# Patient Record
Sex: MALE | Race: WHITE | NOT HISPANIC OR LATINO | Employment: FULL TIME | URBAN - NONMETROPOLITAN AREA
[De-identification: names, ages, dates, MRNs, and addresses within clinical notes are randomized per-mention and may not be internally consistent; named-entity substitution may affect disease eponyms.]

---

## 2017-01-13 ENCOUNTER — HOSPITAL ENCOUNTER (OUTPATIENT)
Dept: LAB | Facility: MEDICAL CENTER | Age: 52
End: 2017-01-13
Attending: NURSE PRACTITIONER
Payer: COMMERCIAL

## 2017-01-13 LAB — POTASSIUM SERPL-SCNC: 3.3 MMOL/L (ref 3.6–5.5)

## 2017-01-13 PROCEDURE — 84132 ASSAY OF SERUM POTASSIUM: CPT

## 2017-01-13 PROCEDURE — 36415 COLL VENOUS BLD VENIPUNCTURE: CPT

## 2017-03-02 ENCOUNTER — HOSPITAL ENCOUNTER (OUTPATIENT)
Dept: LAB | Facility: MEDICAL CENTER | Age: 52
End: 2017-03-02
Attending: NURSE PRACTITIONER
Payer: COMMERCIAL

## 2017-03-02 LAB — POTASSIUM SERPL-SCNC: 3.9 MMOL/L (ref 3.6–5.5)

## 2017-03-02 PROCEDURE — 36415 COLL VENOUS BLD VENIPUNCTURE: CPT

## 2017-03-02 PROCEDURE — 84132 ASSAY OF SERUM POTASSIUM: CPT

## 2017-11-01 ENCOUNTER — HOSPITAL ENCOUNTER (OUTPATIENT)
Dept: LAB | Facility: MEDICAL CENTER | Age: 52
End: 2017-11-01
Attending: NURSE PRACTITIONER
Payer: COMMERCIAL

## 2017-11-01 LAB
ALBUMIN SERPL BCP-MCNC: 3.9 G/DL (ref 3.2–4.9)
ALBUMIN/GLOB SERPL: 1.2 G/DL
ALP SERPL-CCNC: 60 U/L (ref 30–99)
ALT SERPL-CCNC: 15 U/L (ref 2–50)
ANION GAP SERPL CALC-SCNC: 6 MMOL/L (ref 0–11.9)
AST SERPL-CCNC: 20 U/L (ref 12–45)
BASOPHILS # BLD AUTO: 1 % (ref 0–1.8)
BASOPHILS # BLD: 0.07 K/UL (ref 0–0.12)
BILIRUB SERPL-MCNC: 0.7 MG/DL (ref 0.1–1.5)
BUN SERPL-MCNC: 22 MG/DL (ref 8–22)
CALCIUM SERPL-MCNC: 9.7 MG/DL (ref 8.5–10.5)
CHLORIDE SERPL-SCNC: 103 MMOL/L (ref 96–112)
CHOLEST SERPL-MCNC: 168 MG/DL (ref 100–199)
CO2 SERPL-SCNC: 30 MMOL/L (ref 20–33)
CREAT SERPL-MCNC: 0.89 MG/DL (ref 0.5–1.4)
EOSINOPHIL # BLD AUTO: 0.15 K/UL (ref 0–0.51)
EOSINOPHIL NFR BLD: 2 % (ref 0–6.9)
ERYTHROCYTE [DISTWIDTH] IN BLOOD BY AUTOMATED COUNT: 44.2 FL (ref 35.9–50)
GFR SERPL CREATININE-BSD FRML MDRD: >60 ML/MIN/1.73 M 2
GLOBULIN SER CALC-MCNC: 3.2 G/DL (ref 1.9–3.5)
GLUCOSE SERPL-MCNC: 94 MG/DL (ref 65–99)
HCT VFR BLD AUTO: 46.7 % (ref 42–52)
HDLC SERPL-MCNC: 37 MG/DL
HGB BLD-MCNC: 15.5 G/DL (ref 14–18)
IMM GRANULOCYTES # BLD AUTO: 0.03 K/UL (ref 0–0.11)
IMM GRANULOCYTES NFR BLD AUTO: 0.4 % (ref 0–0.9)
LDLC SERPL CALC-MCNC: 101 MG/DL
LYMPHOCYTES # BLD AUTO: 1.8 K/UL (ref 1–4.8)
LYMPHOCYTES NFR BLD: 24.5 % (ref 22–41)
MCH RBC QN AUTO: 31.7 PG (ref 27–33)
MCHC RBC AUTO-ENTMCNC: 33.2 G/DL (ref 33.7–35.3)
MCV RBC AUTO: 95.5 FL (ref 81.4–97.8)
MONOCYTES # BLD AUTO: 0.58 K/UL (ref 0–0.85)
MONOCYTES NFR BLD AUTO: 7.9 % (ref 0–13.4)
NEUTROPHILS # BLD AUTO: 4.73 K/UL (ref 1.82–7.42)
NEUTROPHILS NFR BLD: 64.2 % (ref 44–72)
NRBC # BLD AUTO: 0 K/UL
NRBC BLD AUTO-RTO: 0 /100 WBC
PLATELET # BLD AUTO: 166 K/UL (ref 164–446)
PMV BLD AUTO: 11.3 FL (ref 9–12.9)
POTASSIUM SERPL-SCNC: 3.9 MMOL/L (ref 3.6–5.5)
PROT SERPL-MCNC: 7.1 G/DL (ref 6–8.2)
RBC # BLD AUTO: 4.89 M/UL (ref 4.7–6.1)
SODIUM SERPL-SCNC: 139 MMOL/L (ref 135–145)
TRIGL SERPL-MCNC: 152 MG/DL (ref 0–149)
WBC # BLD AUTO: 7.4 K/UL (ref 4.8–10.8)

## 2017-11-01 PROCEDURE — 80053 COMPREHEN METABOLIC PANEL: CPT

## 2017-11-01 PROCEDURE — 80061 LIPID PANEL: CPT

## 2017-11-01 PROCEDURE — 36415 COLL VENOUS BLD VENIPUNCTURE: CPT

## 2017-11-01 PROCEDURE — 85025 COMPLETE CBC W/AUTO DIFF WBC: CPT

## 2018-06-02 ENCOUNTER — HOSPITAL ENCOUNTER (OUTPATIENT)
Dept: LAB | Facility: MEDICAL CENTER | Age: 53
End: 2018-06-02
Attending: NURSE PRACTITIONER
Payer: COMMERCIAL

## 2018-06-02 LAB
ALBUMIN SERPL BCP-MCNC: 4 G/DL (ref 3.2–4.9)
ALBUMIN/GLOB SERPL: 1.3 G/DL
ALP SERPL-CCNC: 56 U/L (ref 30–99)
ALT SERPL-CCNC: 18 U/L (ref 2–50)
ANION GAP SERPL CALC-SCNC: 7 MMOL/L (ref 0–11.9)
AST SERPL-CCNC: 21 U/L (ref 12–45)
BASOPHILS # BLD AUTO: 0.7 % (ref 0–1.8)
BASOPHILS # BLD: 0.04 K/UL (ref 0–0.12)
BILIRUB SERPL-MCNC: 0.6 MG/DL (ref 0.1–1.5)
BUN SERPL-MCNC: 18 MG/DL (ref 8–22)
CALCIUM SERPL-MCNC: 9.1 MG/DL (ref 8.5–10.5)
CHLORIDE SERPL-SCNC: 104 MMOL/L (ref 96–112)
CHOLEST SERPL-MCNC: 165 MG/DL (ref 100–199)
CO2 SERPL-SCNC: 30 MMOL/L (ref 20–33)
CREAT SERPL-MCNC: 0.97 MG/DL (ref 0.5–1.4)
EOSINOPHIL # BLD AUTO: 0.12 K/UL (ref 0–0.51)
EOSINOPHIL NFR BLD: 2.1 % (ref 0–6.9)
ERYTHROCYTE [DISTWIDTH] IN BLOOD BY AUTOMATED COUNT: 42.7 FL (ref 35.9–50)
GLOBULIN SER CALC-MCNC: 3 G/DL (ref 1.9–3.5)
GLUCOSE SERPL-MCNC: 109 MG/DL (ref 65–99)
HCT VFR BLD AUTO: 43 % (ref 42–52)
HDLC SERPL-MCNC: 37 MG/DL
HGB BLD-MCNC: 15 G/DL (ref 14–18)
IMM GRANULOCYTES # BLD AUTO: 0.03 K/UL (ref 0–0.11)
IMM GRANULOCYTES NFR BLD AUTO: 0.5 % (ref 0–0.9)
LDLC SERPL CALC-MCNC: 86 MG/DL
LYMPHOCYTES # BLD AUTO: 1.29 K/UL (ref 1–4.8)
LYMPHOCYTES NFR BLD: 22.4 % (ref 22–41)
MCH RBC QN AUTO: 32.8 PG (ref 27–33)
MCHC RBC AUTO-ENTMCNC: 34.9 G/DL (ref 33.7–35.3)
MCV RBC AUTO: 94.1 FL (ref 81.4–97.8)
MONOCYTES # BLD AUTO: 0.47 K/UL (ref 0–0.85)
MONOCYTES NFR BLD AUTO: 8.2 % (ref 0–13.4)
NEUTROPHILS # BLD AUTO: 3.81 K/UL (ref 1.82–7.42)
NEUTROPHILS NFR BLD: 66.1 % (ref 44–72)
NRBC # BLD AUTO: 0 K/UL
NRBC BLD-RTO: 0 /100 WBC
PLATELET # BLD AUTO: 175 K/UL (ref 164–446)
PMV BLD AUTO: 10.7 FL (ref 9–12.9)
POTASSIUM SERPL-SCNC: 3.9 MMOL/L (ref 3.6–5.5)
PROT SERPL-MCNC: 7 G/DL (ref 6–8.2)
RBC # BLD AUTO: 4.57 M/UL (ref 4.7–6.1)
SODIUM SERPL-SCNC: 141 MMOL/L (ref 135–145)
TRIGL SERPL-MCNC: 210 MG/DL (ref 0–149)
WBC # BLD AUTO: 5.8 K/UL (ref 4.8–10.8)

## 2018-06-02 PROCEDURE — 80053 COMPREHEN METABOLIC PANEL: CPT

## 2018-06-02 PROCEDURE — 85025 COMPLETE CBC W/AUTO DIFF WBC: CPT

## 2018-06-02 PROCEDURE — 80061 LIPID PANEL: CPT

## 2018-06-02 PROCEDURE — 36415 COLL VENOUS BLD VENIPUNCTURE: CPT

## 2019-04-20 ENCOUNTER — HOSPITAL ENCOUNTER (OUTPATIENT)
Dept: LAB | Facility: MEDICAL CENTER | Age: 54
End: 2019-04-20
Attending: FAMILY MEDICINE
Payer: COMMERCIAL

## 2019-04-20 LAB
ALBUMIN SERPL BCP-MCNC: 3.7 G/DL (ref 3.2–4.9)
ALBUMIN/GLOB SERPL: 1.5 G/DL
ALP SERPL-CCNC: 48 U/L (ref 30–99)
ALT SERPL-CCNC: 19 U/L (ref 2–50)
ANION GAP SERPL CALC-SCNC: 7 MMOL/L (ref 0–11.9)
AST SERPL-CCNC: 18 U/L (ref 12–45)
BASOPHILS # BLD AUTO: 0.9 % (ref 0–1.8)
BASOPHILS # BLD: 0.05 K/UL (ref 0–0.12)
BILIRUB SERPL-MCNC: 0.7 MG/DL (ref 0.1–1.5)
BUN SERPL-MCNC: 19 MG/DL (ref 8–22)
CALCIUM SERPL-MCNC: 8.7 MG/DL (ref 8.5–10.5)
CHLORIDE SERPL-SCNC: 106 MMOL/L (ref 96–112)
CHOLEST SERPL-MCNC: 180 MG/DL (ref 100–199)
CO2 SERPL-SCNC: 28 MMOL/L (ref 20–33)
CREAT SERPL-MCNC: 0.71 MG/DL (ref 0.5–1.4)
EOSINOPHIL # BLD AUTO: 0.18 K/UL (ref 0–0.51)
EOSINOPHIL NFR BLD: 3.2 % (ref 0–6.9)
ERYTHROCYTE [DISTWIDTH] IN BLOOD BY AUTOMATED COUNT: 44.3 FL (ref 35.9–50)
EST. AVERAGE GLUCOSE BLD GHB EST-MCNC: 111 MG/DL
FASTING STATUS PATIENT QL REPORTED: NORMAL
GLOBULIN SER CALC-MCNC: 2.4 G/DL (ref 1.9–3.5)
GLUCOSE SERPL-MCNC: 109 MG/DL (ref 65–99)
HBA1C MFR BLD: 5.5 % (ref 0–5.6)
HCT VFR BLD AUTO: 43.6 % (ref 42–52)
HDLC SERPL-MCNC: 39 MG/DL
HGB BLD-MCNC: 15.1 G/DL (ref 14–18)
IMM GRANULOCYTES # BLD AUTO: 0.02 K/UL (ref 0–0.11)
IMM GRANULOCYTES NFR BLD AUTO: 0.4 % (ref 0–0.9)
LDLC SERPL CALC-MCNC: 127 MG/DL
LYMPHOCYTES # BLD AUTO: 1.23 K/UL (ref 1–4.8)
LYMPHOCYTES NFR BLD: 22.1 % (ref 22–41)
MCH RBC QN AUTO: 33 PG (ref 27–33)
MCHC RBC AUTO-ENTMCNC: 34.6 G/DL (ref 33.7–35.3)
MCV RBC AUTO: 95.4 FL (ref 81.4–97.8)
MONOCYTES # BLD AUTO: 0.51 K/UL (ref 0–0.85)
MONOCYTES NFR BLD AUTO: 9.2 % (ref 0–13.4)
NEUTROPHILS # BLD AUTO: 3.58 K/UL (ref 1.82–7.42)
NEUTROPHILS NFR BLD: 64.2 % (ref 44–72)
NRBC # BLD AUTO: 0 K/UL
NRBC BLD-RTO: 0 /100 WBC
PLATELET # BLD AUTO: 136 K/UL (ref 164–446)
PMV BLD AUTO: 11.3 FL (ref 9–12.9)
POTASSIUM SERPL-SCNC: 3.7 MMOL/L (ref 3.6–5.5)
PROT SERPL-MCNC: 6.1 G/DL (ref 6–8.2)
RBC # BLD AUTO: 4.57 M/UL (ref 4.7–6.1)
SODIUM SERPL-SCNC: 141 MMOL/L (ref 135–145)
TRIGL SERPL-MCNC: 68 MG/DL (ref 0–149)
WBC # BLD AUTO: 5.6 K/UL (ref 4.8–10.8)

## 2019-04-20 PROCEDURE — 85025 COMPLETE CBC W/AUTO DIFF WBC: CPT

## 2019-04-20 PROCEDURE — 80053 COMPREHEN METABOLIC PANEL: CPT

## 2019-04-20 PROCEDURE — 80061 LIPID PANEL: CPT

## 2019-04-20 PROCEDURE — 83036 HEMOGLOBIN GLYCOSYLATED A1C: CPT

## 2019-04-20 PROCEDURE — 36415 COLL VENOUS BLD VENIPUNCTURE: CPT

## 2020-08-22 ENCOUNTER — HOSPITAL ENCOUNTER (OUTPATIENT)
Dept: LAB | Facility: MEDICAL CENTER | Age: 55
End: 2020-08-22
Attending: FAMILY MEDICINE
Payer: COMMERCIAL

## 2020-08-22 LAB
ALBUMIN SERPL BCP-MCNC: 4 G/DL (ref 3.2–4.9)
ALBUMIN/GLOB SERPL: 1.6 G/DL
ALP SERPL-CCNC: 53 U/L (ref 30–99)
ALT SERPL-CCNC: 17 U/L (ref 2–50)
ANION GAP SERPL CALC-SCNC: 10 MMOL/L (ref 7–16)
AST SERPL-CCNC: 14 U/L (ref 12–45)
BASOPHILS # BLD AUTO: 0.7 % (ref 0–1.8)
BASOPHILS # BLD: 0.04 K/UL (ref 0–0.12)
BILIRUB SERPL-MCNC: 0.6 MG/DL (ref 0.1–1.5)
BUN SERPL-MCNC: 20 MG/DL (ref 8–22)
CALCIUM SERPL-MCNC: 9.2 MG/DL (ref 8.5–10.5)
CHLORIDE SERPL-SCNC: 103 MMOL/L (ref 96–112)
CHOLEST SERPL-MCNC: 189 MG/DL (ref 100–199)
CO2 SERPL-SCNC: 28 MMOL/L (ref 20–33)
CREAT SERPL-MCNC: 0.71 MG/DL (ref 0.5–1.4)
EOSINOPHIL # BLD AUTO: 0.15 K/UL (ref 0–0.51)
EOSINOPHIL NFR BLD: 2.5 % (ref 0–6.9)
ERYTHROCYTE [DISTWIDTH] IN BLOOD BY AUTOMATED COUNT: 43.8 FL (ref 35.9–50)
EST. AVERAGE GLUCOSE BLD GHB EST-MCNC: 108 MG/DL
FASTING STATUS PATIENT QL REPORTED: NORMAL
GLOBULIN SER CALC-MCNC: 2.5 G/DL (ref 1.9–3.5)
GLUCOSE SERPL-MCNC: 110 MG/DL (ref 65–99)
HBA1C MFR BLD: 5.4 % (ref 0–5.6)
HCT VFR BLD AUTO: 45.3 % (ref 42–52)
HDLC SERPL-MCNC: 40 MG/DL
HGB BLD-MCNC: 15.4 G/DL (ref 14–18)
IMM GRANULOCYTES # BLD AUTO: 0.01 K/UL (ref 0–0.11)
IMM GRANULOCYTES NFR BLD AUTO: 0.2 % (ref 0–0.9)
LDLC SERPL CALC-MCNC: 134 MG/DL
LYMPHOCYTES # BLD AUTO: 1.66 K/UL (ref 1–4.8)
LYMPHOCYTES NFR BLD: 28 % (ref 22–41)
MCH RBC QN AUTO: 32.9 PG (ref 27–33)
MCHC RBC AUTO-ENTMCNC: 34 G/DL (ref 33.7–35.3)
MCV RBC AUTO: 96.8 FL (ref 81.4–97.8)
MONOCYTES # BLD AUTO: 0.5 K/UL (ref 0–0.85)
MONOCYTES NFR BLD AUTO: 8.4 % (ref 0–13.4)
NEUTROPHILS # BLD AUTO: 3.57 K/UL (ref 1.82–7.42)
NEUTROPHILS NFR BLD: 60.2 % (ref 44–72)
NRBC # BLD AUTO: 0 K/UL
NRBC BLD-RTO: 0 /100 WBC
PLATELET # BLD AUTO: 155 K/UL (ref 164–446)
PMV BLD AUTO: 10.3 FL (ref 9–12.9)
POTASSIUM SERPL-SCNC: 3.8 MMOL/L (ref 3.6–5.5)
PROT SERPL-MCNC: 6.5 G/DL (ref 6–8.2)
RBC # BLD AUTO: 4.68 M/UL (ref 4.7–6.1)
SODIUM SERPL-SCNC: 141 MMOL/L (ref 135–145)
TRIGL SERPL-MCNC: 77 MG/DL (ref 0–149)
WBC # BLD AUTO: 5.9 K/UL (ref 4.8–10.8)

## 2020-08-22 PROCEDURE — 85025 COMPLETE CBC W/AUTO DIFF WBC: CPT

## 2020-08-22 PROCEDURE — 36415 COLL VENOUS BLD VENIPUNCTURE: CPT

## 2020-08-22 PROCEDURE — 83036 HEMOGLOBIN GLYCOSYLATED A1C: CPT

## 2020-08-22 PROCEDURE — 80053 COMPREHEN METABOLIC PANEL: CPT

## 2020-08-22 PROCEDURE — 80061 LIPID PANEL: CPT

## 2021-09-18 ENCOUNTER — HOSPITAL ENCOUNTER (OUTPATIENT)
Dept: LAB | Facility: MEDICAL CENTER | Age: 56
End: 2021-09-18
Attending: FAMILY MEDICINE
Payer: COMMERCIAL

## 2021-09-18 LAB
ALBUMIN SERPL BCP-MCNC: 4.1 G/DL (ref 3.2–4.9)
ALBUMIN/GLOB SERPL: 1.4 G/DL
ALP SERPL-CCNC: 59 U/L (ref 30–99)
ALT SERPL-CCNC: 17 U/L (ref 2–50)
ANION GAP SERPL CALC-SCNC: 10 MMOL/L (ref 7–16)
AST SERPL-CCNC: 17 U/L (ref 12–45)
BASOPHILS # BLD AUTO: 0.7 % (ref 0–1.8)
BASOPHILS # BLD: 0.05 K/UL (ref 0–0.12)
BILIRUB SERPL-MCNC: 0.6 MG/DL (ref 0.1–1.5)
BUN SERPL-MCNC: 24 MG/DL (ref 8–22)
CALCIUM SERPL-MCNC: 9.3 MG/DL (ref 8.5–10.5)
CHLORIDE SERPL-SCNC: 103 MMOL/L (ref 96–112)
CHOLEST SERPL-MCNC: 190 MG/DL (ref 100–199)
CO2 SERPL-SCNC: 29 MMOL/L (ref 20–33)
CREAT SERPL-MCNC: 0.81 MG/DL (ref 0.5–1.4)
EOSINOPHIL # BLD AUTO: 0.13 K/UL (ref 0–0.51)
EOSINOPHIL NFR BLD: 1.7 % (ref 0–6.9)
ERYTHROCYTE [DISTWIDTH] IN BLOOD BY AUTOMATED COUNT: 45.5 FL (ref 35.9–50)
FASTING STATUS PATIENT QL REPORTED: NORMAL
GLOBULIN SER CALC-MCNC: 2.9 G/DL (ref 1.9–3.5)
GLUCOSE SERPL-MCNC: 112 MG/DL (ref 65–99)
HCT VFR BLD AUTO: 45.6 % (ref 42–52)
HDLC SERPL-MCNC: 38 MG/DL
HGB BLD-MCNC: 15.6 G/DL (ref 14–18)
IMM GRANULOCYTES # BLD AUTO: 0.03 K/UL (ref 0–0.11)
IMM GRANULOCYTES NFR BLD AUTO: 0.4 % (ref 0–0.9)
LDLC SERPL CALC-MCNC: 130 MG/DL
LYMPHOCYTES # BLD AUTO: 1.69 K/UL (ref 1–4.8)
LYMPHOCYTES NFR BLD: 22.7 % (ref 22–41)
MCH RBC QN AUTO: 32.4 PG (ref 27–33)
MCHC RBC AUTO-ENTMCNC: 34.2 G/DL (ref 33.7–35.3)
MCV RBC AUTO: 94.8 FL (ref 81.4–97.8)
MONOCYTES # BLD AUTO: 0.55 K/UL (ref 0–0.85)
MONOCYTES NFR BLD AUTO: 7.4 % (ref 0–13.4)
NEUTROPHILS # BLD AUTO: 4.98 K/UL (ref 1.82–7.42)
NEUTROPHILS NFR BLD: 67.1 % (ref 44–72)
NRBC # BLD AUTO: 0 K/UL
NRBC BLD-RTO: 0 /100 WBC
PLATELET # BLD AUTO: 157 K/UL (ref 164–446)
PMV BLD AUTO: 10.7 FL (ref 9–12.9)
POTASSIUM SERPL-SCNC: 3.9 MMOL/L (ref 3.6–5.5)
PROT SERPL-MCNC: 7 G/DL (ref 6–8.2)
RBC # BLD AUTO: 4.81 M/UL (ref 4.7–6.1)
SODIUM SERPL-SCNC: 142 MMOL/L (ref 135–145)
TRIGL SERPL-MCNC: 109 MG/DL (ref 0–149)
WBC # BLD AUTO: 7.4 K/UL (ref 4.8–10.8)

## 2021-09-18 PROCEDURE — 80053 COMPREHEN METABOLIC PANEL: CPT

## 2021-09-18 PROCEDURE — 80061 LIPID PANEL: CPT

## 2021-09-18 PROCEDURE — 85025 COMPLETE CBC W/AUTO DIFF WBC: CPT

## 2021-09-18 PROCEDURE — 36415 COLL VENOUS BLD VENIPUNCTURE: CPT

## 2022-06-11 ENCOUNTER — HOSPITAL ENCOUNTER (OUTPATIENT)
Dept: LAB | Facility: MEDICAL CENTER | Age: 57
End: 2022-06-11
Attending: ALLERGY & IMMUNOLOGY
Payer: COMMERCIAL

## 2022-06-11 LAB
ANION GAP SERPL CALC-SCNC: 9 MMOL/L (ref 7–16)
BUN SERPL-MCNC: 17 MG/DL (ref 8–22)
CALCIUM SERPL-MCNC: 9 MG/DL (ref 8.5–10.5)
CHLORIDE SERPL-SCNC: 101 MMOL/L (ref 96–112)
CO2 SERPL-SCNC: 28 MMOL/L (ref 20–33)
CREAT SERPL-MCNC: 0.75 MG/DL (ref 0.5–1.4)
GFR SERPLBLD CREATININE-BSD FMLA CKD-EPI: 107 ML/MIN/1.73 M 2
GLUCOSE SERPL-MCNC: 118 MG/DL (ref 65–99)
POTASSIUM SERPL-SCNC: 3.8 MMOL/L (ref 3.6–5.5)
SODIUM SERPL-SCNC: 138 MMOL/L (ref 135–145)

## 2022-06-11 PROCEDURE — 36415 COLL VENOUS BLD VENIPUNCTURE: CPT

## 2022-06-11 PROCEDURE — 80048 BASIC METABOLIC PNL TOTAL CA: CPT

## 2022-06-26 ENCOUNTER — HOSPITAL ENCOUNTER (OUTPATIENT)
Dept: RADIOLOGY | Facility: MEDICAL CENTER | Age: 57
End: 2022-06-26
Attending: ALLERGY & IMMUNOLOGY
Payer: COMMERCIAL

## 2022-06-26 DIAGNOSIS — R05.3 CHRONIC COUGH: ICD-10-CM

## 2022-06-26 DIAGNOSIS — Z85.47 PERSONAL HISTORY OF MALIGNANT NEOPLASM OF TESTIS: ICD-10-CM

## 2022-07-09 ENCOUNTER — HOSPITAL ENCOUNTER (OUTPATIENT)
Dept: RADIOLOGY | Facility: MEDICAL CENTER | Age: 57
End: 2022-07-09
Attending: ALLERGY & IMMUNOLOGY
Payer: COMMERCIAL

## 2022-07-09 DIAGNOSIS — R05.3 CHRONIC COUGH: ICD-10-CM

## 2022-07-09 DIAGNOSIS — Z85.47 PERSONAL HISTORY OF MALIGNANT NEOPLASM OF TESTIS: ICD-10-CM

## 2022-07-09 PROCEDURE — 71260 CT THORAX DX C+: CPT

## 2022-07-09 PROCEDURE — 700117 HCHG RX CONTRAST REV CODE 255: Performed by: ALLERGY & IMMUNOLOGY

## 2022-07-09 RX ADMIN — IOHEXOL 80 ML: 350 INJECTION, SOLUTION INTRAVENOUS at 10:11

## 2022-07-24 ASSESSMENT — ENCOUNTER SYMPTOMS
DYSPNEA AT REST: 0
WHEEZING: 1
HEMOPTYSIS: 0
CHEST TIGHTNESS: 1
RESPIRATORY SYMPTOMS COMMENTS: NO
SHORTNESS OF BREATH: 0

## 2022-08-04 ENCOUNTER — OFFICE VISIT (OUTPATIENT)
Dept: SLEEP MEDICINE | Facility: MEDICAL CENTER | Age: 57
End: 2022-08-04
Payer: COMMERCIAL

## 2022-08-04 VITALS
HEART RATE: 98 BPM | OXYGEN SATURATION: 92 % | SYSTOLIC BLOOD PRESSURE: 116 MMHG | WEIGHT: 271 LBS | HEIGHT: 76 IN | BODY MASS INDEX: 33 KG/M2 | DIASTOLIC BLOOD PRESSURE: 72 MMHG

## 2022-08-04 DIAGNOSIS — K21.9 GASTROESOPHAGEAL REFLUX DISEASE, UNSPECIFIED WHETHER ESOPHAGITIS PRESENT: ICD-10-CM

## 2022-08-04 DIAGNOSIS — R06.02 SOB (SHORTNESS OF BREATH): ICD-10-CM

## 2022-08-04 DIAGNOSIS — Z85.47 HX OF TESTICULAR CANCER: ICD-10-CM

## 2022-08-04 DIAGNOSIS — R91.8 LUNG MASS: ICD-10-CM

## 2022-08-04 DIAGNOSIS — R93.89 ABNORMAL CT OF THE CHEST: ICD-10-CM

## 2022-08-04 DIAGNOSIS — R05.9 COUGH: ICD-10-CM

## 2022-08-04 PROCEDURE — 99202 OFFICE O/P NEW SF 15 MIN: CPT | Performed by: INTERNAL MEDICINE

## 2022-08-04 RX ORDER — FLUTICASONE PROPIONATE AND SALMETEROL XINAFOATE 115; 21 UG/1; UG/1
1 AEROSOL, METERED RESPIRATORY (INHALATION) 2 TIMES DAILY
Qty: 12 G | Refills: 4 | Status: SHIPPED | OUTPATIENT
Start: 2022-08-04 | End: 2022-08-24

## 2022-08-04 RX ORDER — OMEPRAZOLE 20 MG/1
20 CAPSULE, DELAYED RELEASE ORAL DAILY
COMMUNITY
Start: 2022-07-19 | End: 2022-11-22

## 2022-08-04 RX ORDER — HYDROCHLOROTHIAZIDE 50 MG/1
50 TABLET ORAL 2 TIMES DAILY
COMMUNITY
Start: 2022-06-14 | End: 2023-10-30 | Stop reason: SDUPTHER

## 2022-08-04 ASSESSMENT — PATIENT HEALTH QUESTIONNAIRE - PHQ9: CLINICAL INTERPRETATION OF PHQ2 SCORE: 0

## 2022-08-04 ASSESSMENT — FIBROSIS 4 INDEX: FIB4 SCORE: 1.42

## 2022-08-04 NOTE — PROGRESS NOTES
"Pulmonary Clinic Office Note    Reason for visit: \"new patient\"    Chart reviewed prior to patient interview.    Babatunde Sullivan is a 56 y.o. year old male, with a PMHx of SOB  who presented to the Pulmonary Clinic for a new referral.      Background:  -has a hx of testicular cancer in 1984 (16-18 yo), had a mass attached to the left kidney and was removed with LAD, and also the testicle in 1984, then in Dec of 1984 was found to have a RUL lesion, had a toracotomy with wedge resection and was free of malignancy.      Today:  -in March 2020 had hemoptysis with bronchitis and since then has been coughing since then but not as bad  -In Jan 2021 had covid and make his symptoms of SOB, cough significantly worse since then (last 16 months)  -He complains of fatigue, but no fevers , no chills, no weight loss.  -workup with CT chest showed a lung mass in the RML, triggered by workup of his cough of 16  Months and SOB.       No GERD symptoms  No history of respiratory failure requiring mechanical ventilation  No Personal history of non-resolving or recurrent pneumonia  No Personal history of DVT or pulmonary embolism  No Personal history of recurrent sinusitis or epistaxis/purulent discharge  No reported NSAID precipitated cough, wheeze or sinus congestion    No cold air intolerance  No exercise induced cough wheeze  No family hx of atopy and or asthma  No history of nasal polyps  No hx of recent COVID-19 infection    Pulmonary History:  Inhaler Regimen before this clinic visit: none  Tobacco use:  Never smoker, mom and dad smoked inside the house while growing up.  Occupational exposure:  at a longterm  Dust/Mold Exposure: none  Pet(s) exposure: 1 dogs  TB exposure: last test was jan 2022, negative PPD    MMRC Dyspnea Scale  Grade  Description of Breathlessness   0  I only get breathless with strenuous exercise.   1  I get short of breath when hurrying on level ground or walking up a slight hill.   2  On level " ground, I walk slower than people of the same age because of breathlessness, or have to stop for breath when walking at my own pace.   3  I stop for breath after walking about 100 yards or after a few minutes on level ground.   4  I am too breathless to leave the house or I am breathless when dressing.     Influenza Vaccine:  none  COVID vaccine: none  Pneumovax:  none      ----------------------------------------------------------------------------------------------------------------------------------------------------------------------------------------------------------------------------------------------------------------  Past Medical History:   Diagnosis Date   • Back pain    • Back pain    • Bronchitis    • Chickenpox    • Cough    • Influenza    • Obesity    • Ringing in ears    • Tonsillitis    • Wears glasses    • Wheezing      Allergies   Allergen Reactions   • Mefoxin Swelling     Family History   Problem Relation Age of Onset   • Cancer Father            • Hypertension Father    • Stroke Father      Past Surgical History:   Procedure Laterality Date   • ARTHROSCOPY, KNEE     • TONSILLECTOMY       Social History     Tobacco Use   • Smoking status: Never Smoker   • Smokeless tobacco: Never Used   • Tobacco comment: Both of my parents smoked cigarettes   Vaping Use   • Vaping Use: Never used   Substance Use Topics   • Alcohol use: Not Currently     Alcohol/week: 10.8 oz     Types: 12 Cans of beer, 6 Shots of liquor per week     Comment: Have been on the [employer] for 30 years   • Drug use: Not Currently     Types: Marijuana, Oral     Comment: No drugs done in over 30 years         Review of Systems (Positive in Bold, otherwise negative)    Constitutional: fever, chills, night sweats, weightloss  HEENT: headaches, migraines, vision changes, blurred vision, dry eyes,  changes in hearing, rhinorrhea, sinus congestion, dysphagia  Hoarseness of voice, choking  CV: chest pain, CARBAJAL, orthopnea, edema,  "palpitations  Resp: SOB, cough, hemoptysis, asthma, repeated infections, sputum production, wheezing  GI: changes in appetite, nausea, vomiting, diarrhea, constipation, pain, GERD  : Dysuria, hematuria, nocturia  Lymph: swollen glands  MSK: Muscle weakness, wasting, arthralgia, myalgia  Neuro/Psych: Sensory disturbances, seizures, syncope, anxiety, depression    Objective:  Vitals: /72 (BP Location: Left arm, Patient Position: Sitting, BP Cuff Size: Adult)   Pulse 98   Ht 1.93 m (6' 4\") Comment: per pt  Wt 123 kg (271 lb)   SpO2 92%   BMI 32.99 kg/m²     Wt Readings from Last 3 Encounters:   08/04/22 123 kg (271 lb)     Gen: AAO x 3, appears of stated age, well-nourished and in NAD  Eyes: sclerae anicteric, conjunctivae appear normal, PEERLA, EOM in tact  ENT: EAC appears normal w/o erythema/exudate.  Neck: Supple w/o evidence of LAD, thyroid normal and size and w/o nodularity  CV: RRR w/o murmurs, rubs, gallops. Normal S1/S2. No peripheral edema orJVD.  Lungs: CTAB w/o wheezing, rales, rhonchi. Good air entry, normal chest excursion.  GI: Soft and non-tender, + BS x 4. No rebound or guarding.  Extremities: +2/4 bilateral pedal pulses, cool and dry, no edema.  MS: +5/5 bilateral UE/LE muscle strength testing, no obvious joint deformities, swelling noted, good overall muscle tone  Neuro: No focal neurological deficits    ----------------------------------------------------------------------------------------------------------------------------------------------------------------------------------------------------------------------------------------------------------------  Labs, Imaging & Scoring Systems:    Lab results since patient's previous encounter were reviewed with the patient.  Pertinent results discussed below or included within the note.    PFTs (Date: None to date)-    CT Chest: (Date: 7/9/22)-  Lungs: There are diffuse bilateral subpleural interstitial opacities. There are no suspicious " parenchymal lung nodules.  Mediastinum/Amy: There is a right hilar mass/lymphadenopathy with some mass effect on the right anterolateral lower lobe bronchus and encasement of the descending pulmonary artery. Due to postobstructive airspace disease in the right middle   lobe/anterior segment right lower lobe is difficult to obtain an accurate measurement of the suspected masslike area. The AP diameter is estimated at 3.4 cm.  Pleura: No pleural effusion.  Cardiac: Heart normal in size without pericardial effusion.  Vascular: There is mild atherosclerosis with coronary artery calcification.  Soft tissues: There is incidental fat stranding in the right posterior lateral chest wall in the infrascapular location possibly related to incidental elastofibroma dorsi versus simply fatty infiltration. This is of no clinical significance.  Bones: No acute or destructive process.  Upper abdomen:  There is a single 9 mm hay hepatis lymph node of doubtful clinical significance and within normal limits by size criteria. Gallbladder is surgically absent. There is extensive postoperative change in the upper abdomen consistent with   prior retroperitoneal lymph node dissection. There is an incidental fat-containing epigastric ventral abdominal wall hernia.     IMPRESSION:   1.  There is a right hilar mass/lymphadenopathy with mass effect on the anterolateral right lower lobe bronchus and encasement of the descending pulmonary artery suspicious for malignancy. This could represent a primary lung malignancy versus an atypical   appearance of testicular chiquita metastasis.  2.  There is postobstructive pneumonitis/atelectasis in the right middle lobe/anterior lower lobe.  3.  Bilateral subpleural interstitial opacities could represent underlying interstitial disease.       ECHO, (date: None to  date)    ----------------------------------------------------------------------------------------------------------------------------------------------------------------------------------------------------------------------------------------------------------------      Impression:    1. Right hilar mass/lymphadenopathy  2. History of testicular cancer  3. History of pulmonary nodule status post wedge resection via thoracotomy in 1984 in the right hemithorax.  4. Cough  5. Shortness of breath      Discussion:    Babatunde Sullivan is a 56 y.o. with a past medical history of testicular cancer in 1984, at that time it was thought that he had an metastasis in the right hemithorax for what he underwent thoracotomy with pulmonary wedge resection (procedure and results of biopsy are not available to us) but patient was told that it was negative for malignancy.  He has been doing well until recently that started having shortness of breath and cough that triggered work-up with imaging testing.  He was found to have a right hilar mass and lymphadenopathy concerning for malignancy per last CT of the chest.  He was recommended to follow-up with pulmonology, given the location of the abnormalities in the CT I wonder if there is a degree of obstruction produced by scar tissue from prior thoracotomy, nonetheless we need to rule out that there is no malignant process present.  We had a lengthy conversation about possible options such as obtaining a PET scan to determine if this area is PET avid and/or if there is any other areas in the body that will light up that could facilitate tissue sampling.  Also discussed how to obtain tissue sampling via percutaneous biopsy through IR versus bronchoscopy, we discussed pros and cons of each 1 of these modalities and patient decided to proceed with a PET scan first and then plan for a bronchoscopy.  We discussed possible complications of bronchoscopy including bleeding, pneumothorax,  infection, arrhythmias, death, need to remain intubated in the ICU postprocedure among others patient will like to proceed and gave his verbal consent.    Plan:    1. We will obtain a PET scan  2. continue with PPI  GERD recommendations: keep head of the bed elevated at 30 degrees, avoid eating anything 2 hours prior to going to bed, avoid cafeinated drinks when possible even during the day. Try to avoid the following but if you have to have it, you can try to enjoy cafeinated drinks, tea, chocolate (either drink of bar-food) or carbonated drinks in the first half of the day but please avoid it after 2-3pm.  3. Recommend to start Advair as a maintenance inhaler to provide relief of symptoms, continue with albuterol as needed  4. We will need to obtain pulmonary function test when patient is on with the work-up above  5. We will plan for a bronchoscopy, we will schedule from now with a EBUS and FOB but pending results of the PET scan might convert to an Ion case      * Patient Education                         - Educated the patient on his/her disease processes                         - Answered all questions to the patients satisfaction.                         - Reminded the patient to bring all of his/her medication bottles to the next office visit.                           * Return To Clinic:  1 months or PRN      The patient voiced understanding of the above treatment approach and agreed with the direction of care. The patient was educated about their conditions and all questions were addressed during this encounter. I have also reminded the patient to bring all of their medications to the next office visit.  Babatunde Sullivan was instructed to call the office if any questions or concerns arise prior to their next appointment.  This note reflects my clinical thought processes and is intended primarily for the exchange of information between healthcare providers.  It is not written primarily to communicate with  "the patient or family directly, which takes place in-person in clinic, by phone/virtual visits or the bedside.  This note might contain sensitive information, including substance use, mental health and consideration of sensitive, serious or other \"do-not-miss\" diagnoses, which is further discussed at the bedside.    Adam Rahman MD FACP  Pulmonary/Critical Care   "

## 2022-08-04 NOTE — PATIENT INSTRUCTIONS
Thanks for coming to the office today.  Please bring all of your medication bottles to the next office visit, specially inhalers.    GERD recommendations: keep head of the bed elevated at 30 degrees, avoid eating anything 2 hours prior to going to bed, avoid cafeinated drinks when possible even during the day. Try to avoid the following but if you have to have it, you can try to enjoy cafeinated drinks, tea, chocolate (either drink of bar-food) or carbonated drinks in the first half of the day but please avoid it after 2-3pm.       If requested, please obtain the prior records from your lung doctor.  If you have been prescribed inhalers, please use them as indicated and please rinse your mouth after using them each time.  Call if any questions!                    Return To Clinic:  1 months. Please do not forget to come at least 20 minutes prior to your appointment.  It has been a pleasure seeing you today.    Adam Rahman MD  Pulmonary/Critical Care

## 2022-08-15 ENCOUNTER — PATIENT MESSAGE (OUTPATIENT)
Dept: SLEEP MEDICINE | Facility: MEDICAL CENTER | Age: 57
End: 2022-08-15
Payer: COMMERCIAL

## 2022-08-18 ENCOUNTER — HOSPITAL ENCOUNTER (OUTPATIENT)
Dept: RADIOLOGY | Facility: MEDICAL CENTER | Age: 57
End: 2022-08-18
Attending: INTERNAL MEDICINE
Payer: COMMERCIAL

## 2022-08-18 DIAGNOSIS — R06.02 SOB (SHORTNESS OF BREATH): ICD-10-CM

## 2022-08-18 DIAGNOSIS — R05.9 COUGH: ICD-10-CM

## 2022-08-18 DIAGNOSIS — R91.8 LUNG MASS: ICD-10-CM

## 2022-08-18 DIAGNOSIS — Z85.47 HX OF TESTICULAR CANCER: ICD-10-CM

## 2022-08-18 DIAGNOSIS — R93.89 ABNORMAL CT OF THE CHEST: ICD-10-CM

## 2022-08-18 DIAGNOSIS — K21.9 GASTROESOPHAGEAL REFLUX DISEASE, UNSPECIFIED WHETHER ESOPHAGITIS PRESENT: ICD-10-CM

## 2022-08-18 PROCEDURE — A9552 F18 FDG: HCPCS

## 2022-08-19 ENCOUNTER — PATIENT MESSAGE (OUTPATIENT)
Dept: SLEEP MEDICINE | Facility: MEDICAL CENTER | Age: 57
End: 2022-08-19
Payer: COMMERCIAL

## 2022-08-19 DIAGNOSIS — R05.9 COUGH: ICD-10-CM

## 2022-08-19 DIAGNOSIS — Z85.47 HX OF TESTICULAR CANCER: ICD-10-CM

## 2022-08-19 DIAGNOSIS — K21.9 GASTROESOPHAGEAL REFLUX DISEASE, UNSPECIFIED WHETHER ESOPHAGITIS PRESENT: ICD-10-CM

## 2022-08-19 DIAGNOSIS — R91.8 LUNG MASS: ICD-10-CM

## 2022-08-19 DIAGNOSIS — R06.02 SOB (SHORTNESS OF BREATH): ICD-10-CM

## 2022-08-24 ENCOUNTER — PRE-ADMISSION TESTING (OUTPATIENT)
Dept: ADMISSIONS | Facility: MEDICAL CENTER | Age: 57
End: 2022-08-24
Attending: INTERNAL MEDICINE
Payer: COMMERCIAL

## 2022-08-24 ENCOUNTER — APPOINTMENT (OUTPATIENT)
Dept: ADMISSIONS | Facility: MEDICAL CENTER | Age: 57
End: 2022-08-24
Payer: COMMERCIAL

## 2022-08-24 ENCOUNTER — TELEPHONE (OUTPATIENT)
Dept: SLEEP MEDICINE | Facility: MEDICAL CENTER | Age: 57
End: 2022-08-24

## 2022-08-24 DIAGNOSIS — Z01.818 PRE-PROCEDURAL EXAMINATION: ICD-10-CM

## 2022-08-24 RX ORDER — MULTIVIT WITH MINERALS/LUTEIN
TABLET ORAL DAILY
COMMUNITY
End: 2022-10-18

## 2022-08-24 NOTE — PREPROCEDURE INSTRUCTIONS
Pre admit apt: Pt. Instructed to continue regularly prescribed medications through day before surgery.  Instructed to take the following medications, the day of surgery, with a sip of water per anesthesia protocol: omeprazole    METS-4  Pt has PONV with anesthesia, otherwise no other issues.    Pt needs to get a BMP at [facility]

## 2022-08-24 NOTE — OR NURSING
Called pt, left message to go ahead and get his lab, bmp at [facility] in [place].  The order is in.

## 2022-08-24 NOTE — TELEPHONE ENCOUNTER
Message: Pre-Admit is asking for a BMP lab order to be placed in Epic.  Pt is getting a Bronch. On 09/01/22.    BMP Order Pended

## 2022-08-25 NOTE — PATIENT COMMUNICATION
Tried to get a PA on covermymeds.  No auth required.    Called and spoke with pharmacy. They stated the provider cancelled rx so they are unable to see what is happening with rx.    Re-pended rx. Please advise

## 2022-08-29 RX ORDER — FLUTICASONE PROPIONATE AND SALMETEROL XINAFOATE 115; 21 UG/1; UG/1
1 AEROSOL, METERED RESPIRATORY (INHALATION) 2 TIMES DAILY
Qty: 12 G | Refills: 4 | Status: SHIPPED | OUTPATIENT
Start: 2022-08-29 | End: 2022-10-10

## 2022-09-01 ENCOUNTER — ANESTHESIA (OUTPATIENT)
Dept: SURGERY | Facility: MEDICAL CENTER | Age: 57
End: 2022-09-01
Payer: COMMERCIAL

## 2022-09-01 ENCOUNTER — HOSPITAL ENCOUNTER (OUTPATIENT)
Facility: MEDICAL CENTER | Age: 57
End: 2022-09-01
Attending: INTERNAL MEDICINE | Admitting: INTERNAL MEDICINE
Payer: COMMERCIAL

## 2022-09-01 ENCOUNTER — APPOINTMENT (OUTPATIENT)
Dept: RADIOLOGY | Facility: MEDICAL CENTER | Age: 57
End: 2022-09-01
Attending: INTERNAL MEDICINE
Payer: COMMERCIAL

## 2022-09-01 ENCOUNTER — ANESTHESIA EVENT (OUTPATIENT)
Dept: SURGERY | Facility: MEDICAL CENTER | Age: 57
End: 2022-09-01
Payer: COMMERCIAL

## 2022-09-01 VITALS
RESPIRATION RATE: 18 BRPM | WEIGHT: 267.2 LBS | SYSTOLIC BLOOD PRESSURE: 117 MMHG | HEART RATE: 65 BPM | TEMPERATURE: 96.8 F | DIASTOLIC BLOOD PRESSURE: 63 MMHG | BODY MASS INDEX: 32.54 KG/M2 | OXYGEN SATURATION: 92 % | HEIGHT: 76 IN

## 2022-09-01 DIAGNOSIS — I82.90 THROMBOSIS: Chronic | ICD-10-CM

## 2022-09-01 DIAGNOSIS — R91.8 ENDOBRONCHIAL MASS: ICD-10-CM

## 2022-09-01 LAB — PATHOLOGY CONSULT NOTE: NORMAL

## 2022-09-01 PROCEDURE — 700111 HCHG RX REV CODE 636 W/ 250 OVERRIDE (IP): Performed by: ANESTHESIOLOGY

## 2022-09-01 PROCEDURE — 160029 HCHG SURGERY MINUTES - 1ST 30 MINS LEVEL 4: Performed by: INTERNAL MEDICINE

## 2022-09-01 PROCEDURE — 700101 HCHG RX REV CODE 250: Performed by: ANESTHESIOLOGY

## 2022-09-01 PROCEDURE — 00520 ANES CLOSED CHEST PX NOS: CPT | Performed by: ANESTHESIOLOGY

## 2022-09-01 PROCEDURE — 88173 CYTOPATH EVAL FNA REPORT: CPT

## 2022-09-01 PROCEDURE — 160036 HCHG PACU - EA ADDL 30 MINS PHASE I: Performed by: INTERNAL MEDICINE

## 2022-09-01 PROCEDURE — 88341 IMHCHEM/IMCYTCHM EA ADD ANTB: CPT | Mod: 91

## 2022-09-01 PROCEDURE — 160009 HCHG ANES TIME/MIN: Performed by: INTERNAL MEDICINE

## 2022-09-01 PROCEDURE — 700105 HCHG RX REV CODE 258: Performed by: INTERNAL MEDICINE

## 2022-09-01 PROCEDURE — 88333 PATH CONSLTJ SURG CYTO XM 1: CPT

## 2022-09-01 PROCEDURE — 160035 HCHG PACU - 1ST 60 MINS PHASE I: Performed by: INTERNAL MEDICINE

## 2022-09-01 PROCEDURE — 88342 IMHCHEM/IMCYTCHM 1ST ANTB: CPT

## 2022-09-01 PROCEDURE — 88177 CYTP FNA EVAL EA ADDL: CPT | Mod: 91

## 2022-09-01 PROCEDURE — 88313 SPECIAL STAINS GROUP 2: CPT

## 2022-09-01 PROCEDURE — 160048 HCHG OR STATISTICAL LEVEL 1-5: Performed by: INTERNAL MEDICINE

## 2022-09-01 PROCEDURE — 160041 HCHG SURGERY MINUTES - EA ADDL 1 MIN LEVEL 4: Performed by: INTERNAL MEDICINE

## 2022-09-01 PROCEDURE — 88172 CYTP DX EVAL FNA 1ST EA SITE: CPT

## 2022-09-01 PROCEDURE — 88305 TISSUE EXAM BY PATHOLOGIST: CPT | Mod: 59

## 2022-09-01 PROCEDURE — 160025 RECOVERY II MINUTES (STATS): Performed by: INTERNAL MEDICINE

## 2022-09-01 PROCEDURE — 160002 HCHG RECOVERY MINUTES (STAT): Performed by: INTERNAL MEDICINE

## 2022-09-01 PROCEDURE — 88112 CYTOPATH CELL ENHANCE TECH: CPT | Mod: 91

## 2022-09-01 PROCEDURE — 160046 HCHG PACU - 1ST 60 MINS PHASE II: Performed by: INTERNAL MEDICINE

## 2022-09-01 RX ORDER — ONDANSETRON 2 MG/ML
4 INJECTION INTRAMUSCULAR; INTRAVENOUS
Status: COMPLETED | OUTPATIENT
Start: 2022-09-01 | End: 2022-09-01

## 2022-09-01 RX ORDER — HYDROMORPHONE HYDROCHLORIDE 1 MG/ML
0.4 INJECTION, SOLUTION INTRAMUSCULAR; INTRAVENOUS; SUBCUTANEOUS
Status: DISCONTINUED | OUTPATIENT
Start: 2022-09-01 | End: 2022-09-01 | Stop reason: HOSPADM

## 2022-09-01 RX ORDER — OXYCODONE HCL 5 MG/5 ML
10 SOLUTION, ORAL ORAL
Status: DISCONTINUED | OUTPATIENT
Start: 2022-09-01 | End: 2022-09-01 | Stop reason: HOSPADM

## 2022-09-01 RX ORDER — SODIUM CHLORIDE, SODIUM LACTATE, POTASSIUM CHLORIDE, CALCIUM CHLORIDE 600; 310; 30; 20 MG/100ML; MG/100ML; MG/100ML; MG/100ML
INJECTION, SOLUTION INTRAVENOUS CONTINUOUS
Status: ACTIVE | OUTPATIENT
Start: 2022-09-01 | End: 2022-09-01

## 2022-09-01 RX ORDER — METOPROLOL TARTRATE 1 MG/ML
1 INJECTION, SOLUTION INTRAVENOUS
Status: DISCONTINUED | OUTPATIENT
Start: 2022-09-01 | End: 2022-09-01 | Stop reason: HOSPADM

## 2022-09-01 RX ORDER — ONDANSETRON 2 MG/ML
INJECTION INTRAMUSCULAR; INTRAVENOUS PRN
Status: DISCONTINUED | OUTPATIENT
Start: 2022-09-01 | End: 2022-09-01 | Stop reason: SURG

## 2022-09-01 RX ORDER — LIDOCAINE HYDROCHLORIDE 20 MG/ML
INJECTION, SOLUTION EPIDURAL; INFILTRATION; INTRACAUDAL; PERINEURAL PRN
Status: DISCONTINUED | OUTPATIENT
Start: 2022-09-01 | End: 2022-09-01 | Stop reason: SURG

## 2022-09-01 RX ORDER — DEXAMETHASONE SODIUM PHOSPHATE 4 MG/ML
INJECTION, SOLUTION INTRA-ARTICULAR; INTRALESIONAL; INTRAMUSCULAR; INTRAVENOUS; SOFT TISSUE PRN
Status: DISCONTINUED | OUTPATIENT
Start: 2022-09-01 | End: 2022-09-01 | Stop reason: SURG

## 2022-09-01 RX ORDER — IPRATROPIUM BROMIDE AND ALBUTEROL SULFATE 2.5; .5 MG/3ML; MG/3ML
3 SOLUTION RESPIRATORY (INHALATION)
Status: DISCONTINUED | OUTPATIENT
Start: 2022-09-01 | End: 2022-09-01 | Stop reason: HOSPADM

## 2022-09-01 RX ORDER — DIPHENHYDRAMINE HYDROCHLORIDE 50 MG/ML
12.5 INJECTION INTRAMUSCULAR; INTRAVENOUS
Status: DISCONTINUED | OUTPATIENT
Start: 2022-09-01 | End: 2022-09-01 | Stop reason: HOSPADM

## 2022-09-01 RX ORDER — HYDRALAZINE HYDROCHLORIDE 20 MG/ML
5 INJECTION INTRAMUSCULAR; INTRAVENOUS
Status: DISCONTINUED | OUTPATIENT
Start: 2022-09-01 | End: 2022-09-01 | Stop reason: HOSPADM

## 2022-09-01 RX ORDER — HYDROMORPHONE HYDROCHLORIDE 1 MG/ML
0.2 INJECTION, SOLUTION INTRAMUSCULAR; INTRAVENOUS; SUBCUTANEOUS
Status: DISCONTINUED | OUTPATIENT
Start: 2022-09-01 | End: 2022-09-01 | Stop reason: HOSPADM

## 2022-09-01 RX ORDER — POTASSIUM CHLORIDE 1500 MG/1
20 TABLET, EXTENDED RELEASE ORAL 3 TIMES DAILY
COMMUNITY
Start: 2022-06-14 | End: 2023-07-12

## 2022-09-01 RX ORDER — HYDROMORPHONE HYDROCHLORIDE 1 MG/ML
0.1 INJECTION, SOLUTION INTRAMUSCULAR; INTRAVENOUS; SUBCUTANEOUS
Status: DISCONTINUED | OUTPATIENT
Start: 2022-09-01 | End: 2022-09-01 | Stop reason: HOSPADM

## 2022-09-01 RX ORDER — MORPHINE SULFATE 0.5 MG/ML
INJECTION, SOLUTION EPIDURAL; INTRATHECAL; INTRAVENOUS PRN
Status: DISCONTINUED | OUTPATIENT
Start: 2022-09-01 | End: 2022-09-01 | Stop reason: SURG

## 2022-09-01 RX ORDER — HALOPERIDOL 5 MG/ML
1 INJECTION INTRAMUSCULAR
Status: DISCONTINUED | OUTPATIENT
Start: 2022-09-01 | End: 2022-09-01 | Stop reason: HOSPADM

## 2022-09-01 RX ORDER — OXYCODONE HCL 5 MG/5 ML
5 SOLUTION, ORAL ORAL
Status: DISCONTINUED | OUTPATIENT
Start: 2022-09-01 | End: 2022-09-01 | Stop reason: HOSPADM

## 2022-09-01 RX ADMIN — SODIUM CHLORIDE, POTASSIUM CHLORIDE, SODIUM LACTATE AND CALCIUM CHLORIDE: 600; 310; 30; 20 INJECTION, SOLUTION INTRAVENOUS at 14:54

## 2022-09-01 RX ADMIN — ONDANSETRON 4 MG: 2 INJECTION INTRAMUSCULAR; INTRAVENOUS at 16:08

## 2022-09-01 RX ADMIN — ONDANSETRON 4 MG: 2 INJECTION INTRAMUSCULAR; INTRAVENOUS at 14:03

## 2022-09-01 RX ADMIN — SODIUM CHLORIDE, POTASSIUM CHLORIDE, SODIUM LACTATE AND CALCIUM CHLORIDE: 600; 310; 30; 20 INJECTION, SOLUTION INTRAVENOUS at 11:48

## 2022-09-01 RX ADMIN — DEXAMETHASONE SODIUM PHOSPHATE 8 MG: 4 INJECTION, SOLUTION INTRA-ARTICULAR; INTRALESIONAL; INTRAMUSCULAR; INTRAVENOUS; SOFT TISSUE at 14:03

## 2022-09-01 RX ADMIN — MIDAZOLAM 2 MG: 1 INJECTION INTRAMUSCULAR; INTRAVENOUS at 13:42

## 2022-09-01 RX ADMIN — ROCURONIUM BROMIDE 50 MG: 10 INJECTION, SOLUTION INTRAVENOUS at 13:48

## 2022-09-01 RX ADMIN — PROPOFOL 250 MG: 10 INJECTION, EMULSION INTRAVENOUS at 13:48

## 2022-09-01 RX ADMIN — SUGAMMADEX 200 MG: 100 INJECTION, SOLUTION INTRAVENOUS at 15:31

## 2022-09-01 RX ADMIN — LIDOCAINE HYDROCHLORIDE 100 MG: 20 INJECTION, SOLUTION EPIDURAL; INFILTRATION; INTRACAUDAL at 13:48

## 2022-09-01 RX ADMIN — MORPHINE SULFATE 5 MG: 0.5 INJECTION EPIDURAL; INTRATHECAL; INTRAVENOUS at 15:21

## 2022-09-01 RX ADMIN — FENTANYL CITRATE 100 MCG: 50 INJECTION, SOLUTION INTRAMUSCULAR; INTRAVENOUS at 13:44

## 2022-09-01 ASSESSMENT — FIBROSIS 4 INDEX: FIB4 SCORE: 1.42

## 2022-09-01 ASSESSMENT — PAIN SCALES - GENERAL: PAIN_LEVEL: 0

## 2022-09-01 NOTE — DISCHARGE INSTRUCTIONS
You had a bronchoscopy today, you might have some cough and cough with blood, this is expected, should get better in the next 24 hr and completely gone in 48 hr. If you experience cough with bleeding that becomes worse please visit the closest emergency department or call 911.  The results might take several days, we will call you with results.   Please do not drive, do not operate any type of machinery and do not take care of young people or children given the medications you received, at lest for today and tomorrow. Also is recommended you do not sign any legal or important documents for the same reason  Please call our office to make an appointment to be seen within 2-6 weeks.  *Special Instructions: Schedule CT CHEST outpatient. CT order attached.   It has been a pleasure seeing you today!    Adam Rahman MD  Pulmonary/Critical Care      If any questions arise, call your provider.  If your provider is not available, please feel free to call the Surgical Center at [phone].      Flexible Bronchoscopy, Care After  This sheet gives you information about how to care for yourself after your test. Your doctor may also give you more specific instructions. If you have problems or questions, contact your doctor.  Follow these instructions at home:  Eating and drinking  Do not eat or drink anything (not even water) for 2 hours after your test, or until your numbing medicine (local anesthetic) wears off.  When your numbness is gone and your cough and gag reflexes have come back, you may:  Eat only soft foods.  Slowly drink liquids.  The day after the test, go back to your normal diet.  Driving  Do not drive for 24 hours if you were given a medicine to help you relax (sedative).  Do not drive or use heavy machinery while taking prescription pain medicine.  General instructions  Take over-the-counter and prescription medicines only as told by your doctor.  Return to your normal activities as told. Ask what  activities are safe for you.  Do not use any products that have nicotine or tobacco in them. This includes cigarettes and e-cigarettes. If you need help quitting, ask your doctor.  Keep all follow-up visits as told by your doctor. This is important. It is very important if you had a tissue sample (biopsy) taken.  Get help right away if:  You have shortness of breath that gets worse.  You get light-headed.  You feel like you are going to pass out (faint).  You have chest pain.  You cough up:  More than a little blood.  More blood than before.  Summary  Do not eat or drink anything (not even water) for 2 hours after your test, or until your numbing medicine wears off.  Do not use cigarettes. Do not use e-cigarettes.  Get help right away if you have chest pain.  This information is not intended to replace advice given to you by your health care provider. Make sure you discuss any questions you have with your health care provider.  Document Released: 10/15/2010 Document Revised: 11/30/2018 Document Reviewed: 01/05/2018  Elsevier Patient Education © 2020 [employer]

## 2022-09-01 NOTE — ANESTHESIA PROCEDURE NOTES
Airway    Date/Time: 9/1/2022 1:51 PM  Performed by: Miguel Low M.D.  Authorized by: Miguel Low M.D.     Location:  OR  Urgency:  Elective  Indications for Airway Management:  Anesthesia      Spontaneous Ventilation: absent    Sedation Level:  Deep  Preoxygenated: Yes    Patient Position:  Sniffing  Mask Difficulty Assessment:  1 - vent by mask  Final Airway Type:  Endotracheal airway  Final Endotracheal Airway:  ETT  Cuffed: Yes    Technique Used for Successful ETT Placement:  Direct laryngoscopy    Insertion Site:  Oral  Blade Type:  [place]  Laryngoscope Blade/Videolaryngoscope Blade Size:  2  ETT Size (mm):  8.5  Measured from:  Teeth  ETT to Teeth (cm):  23  Placement Verified by: auscultation and capnometry    Cormack-Lehane Classification:  Grade I - full view of glottis  Number of Attempts at Approach:  1

## 2022-09-01 NOTE — ANESTHESIA PREPROCEDURE EVALUATION
Case: [id number] Date/Time: 09/01/22 1245    Procedures:       FIBER OPTIC BRONCHOSCOPY WITH POSSIBLE WASH, BRUSH, BRONCHOALVEOLAR LAVAGE, BIOPSY FINE NEEDLE ASPIRATION AND ENDOBRONCHIAL ULTRASOUND      ENDOBRONCHIAL ULTRASOUND (EBUS)    Pre-op diagnosis: LUNG MASS, HX OF TESTICULAR CANCER, SOB, COUGH, GERD    Location:  PROCEDURE ROOM / SURGERY [facility]    Surgeons: Adam Rahman M.D.          Relevant Problems   No relevant active problems       Physical Exam    Airway   Mallampati: III  TM distance: >3 FB  Neck ROM: full       Cardiovascular - normal exam  Rhythm: regular  Rate: normal  (-) murmur     Dental - normal exam           Pulmonary - normal exam  Breath sounds clear to auscultation     Abdominal    Neurological - normal exam                 Anesthesia Plan    ASA 2       Plan - general       Airway plan will be ETT          Induction: intravenous    Postoperative Plan: Postoperative administration of opioids is intended.    Pertinent diagnostic labs and testing reviewed    Informed Consent:    Anesthetic plan and risks discussed with patient.    Use of blood products discussed with: patient whom consented to blood products.

## 2022-09-01 NOTE — ANESTHESIA POSTPROCEDURE EVALUATION
Patient: Babatunde Sullivan    Procedure Summary     Date: 09/01/22 Room / Location:  PROCEDURE ROOM / SURGERY [facility]    Anesthesia Start: 1340 Anesthesia Stop: 1537    Procedures:       FIBER OPTIC BRONCHOSCOPY WITH  BRUSH, BIOPSY, FINE NEEDLE ASPIRATION AND ENDOBRONCHIAL ULTRASOUND (Chest)      ENDOBRONCHIAL ULTRASOUND (EBUS) (Chest) Diagnosis: (LUNG MASS, HX OF TESTICULAR CANCER, SOB, COUGH, GERD; pending pathology)    Surgeons: Adam Rahman M.D. Responsible Provider: Miguel Low M.D.    Anesthesia Type: general ASA Status: 2          Final Anesthesia Type: general  Last vitals  BP   Blood Pressure: 134/87    Temp   36.6 °C (97.9 °F)    Pulse   74   Resp   18    SpO2   93 %      Anesthesia Post Evaluation    Patient location during evaluation: PACU  Patient participation: complete - patient participated  Level of consciousness: awake and alert  Pain score: 0    Airway patency: patent  Anesthetic complications: no  Cardiovascular status: hemodynamically stable  Respiratory status: acceptable  Hydration status: euvolemic    PONV: none          There were no known notable events for this encounter.     Nurse Pain Score: 0 (NPRS)

## 2022-09-01 NOTE — PROCEDURES
Fiberoptic Bronchoscopy/Endotracheal Ultrasound(EBUS)     Date/Time of Procedure:  09/01/22   1:55 PM      Indication(s): right hilar mass and LAD    Consent: Informed, written consent was obtained prior to the procedure; risks, benefits, & alternatives were discussed.    Universal Protocol/Time Out: A Time Out with checklist was performed prior to the procedure to ensure correct identification of the patient and procedure.    Pre-Procedure Diagnosis: Right hilar mass and LAD    Allergies:  Mefoxin    Sedation/Analgesia/Anesthesia: Sedation as per anesthesia.    Additional Modalities:    [_] Fluoroscopy  [_] Radial Ultrasound  [X] Linear Endobronchial Ultrasound  [_] Rapid On-Site Evaluation  [_] Other: _    Route of Entry:  [_] Nasal [_] Oral [X] ET tube [_] Trach [_] LMA      Findings: After the patient is adequately anesthetized (see procedure for anesthesia), the flexible bronchoscope was passed through the endotracheal tube visualizing the distal trachea:  Trachea: no abnormalities  Luann: sharp no abnormalities  Right lung: RUL was permeable with no endobronchial lesions, the RML was completely obstructed with an endobronchial lesion, and RLL all level one subsegments visualized. Mild narrowing in the posterior subsegment of the RLL  Left lung: ARTHUR, Lingula and LLL , All level one subsegments visualized.     FOB instrumentation  After localizing the RML endobronchial lesion a forceps was used to obtain multiple samples that were removed en bloc, this was collected for diagnostic purpuses but also to see if we could open up the airway, this was not achieved although several samples were taken. Hemostasia was achieved with cold saline.  Then we performed a brush cytology from the same RML targeting the medial wall. A BAL was not able to performed due to unable to open the RML. Then, we localized the posterior subsegment of the RLL and another cyto brush was obtained.      ENDOBRONCHIAL US    Using the  endobronchial ultrasound, a systematic survey of the accessible mediastinal and hilar lymph nodes was performed, started on the left side, stations 2, 4, 10 were examined and no LAD was found, none was found at station 7.   LAD at station 11R . Then, under direct ultrasound visualization, transbronchial needle aspirations were performed at the following lymph node stations: 11R.  There was a suspected artifact vs embolism in one of the pulmonary vessels visualized with EBUS, recommend CTA.      1. SIZE OF NEEDLE   22 G [employer]     2. STATIONS SAMPLED   11R     3. ROBY CONFIRMATION  + lymphocyte sample      Specimens: _  [_] Bronchoalveolar Lavage (BAL):   [  ] Wash:    [x ] Brush: RML and RLL (post subseg)  [_] Transbronchial Needle Aspiration (TBNA):   [x ] Endobronchial Biopsy (Endo):  forceps from endobronchial lesion from RML  [_] Transbronchial Biopsy (TBBx): _  [_] Other:   [_] Veran Navigational Bronchoscopy:   [X] Endobronchial Ultrasound (EBUS) TBNA: station 11R, 5 passes    Media Information  File Link    Clinical Picture - Scan on 9/1/2022 3:36 PM: RML complete obstruction of the lumen due to endobronchial lesion        Key Information    Document ID File Type Document Type Description   [id number] Image Clinical Picture RML complete obstruction of the lumen due to endobronchial lesion      Import Information    Attached At Date Time User Dept   Encounter Level 9/1/2022  3:36 PM Adam Rahman M.D. [facility]     Encounter    Hospital Encounter on 9/1/22     Document Information    Photographic Image:  Clinical Picture   RML complete obstruction of the lumen due to endobronchial lesion    09/01/2022 3:36 PM   Attached To:   Hospital Encounter on 9/1/22     Source Information    [facility]     Media Information  File Link    Clinical Picture - Scan on 9/1/2022 3:34 PM: Station 11R        Key Information    Document ID File Type Document Type  Description   [id number] Image Clinical Picture Station 11R     Import Information    Attached At Date Time User Dept   Encounter Level 9/1/2022  3:34 PM [facility]ENNIF[facility]     Encounter    Hospital Encounter on 9/1/22     Document Information    Photographic Image:  Clinical Picture   Station 11R   09/01/2022 3:34 PM   Attached To:   Hospital Encounter on 9/1/22     Source Information    [facility]           Adam Rahman MD FACP  Pulmonary/Critical Care

## 2022-09-01 NOTE — OR NURSING
1106: Brought patient back to pre-op and assumed care.   1154: Patient allergies and NPO status verified, home medication reconciliation completed and belongings secured. Patient verbalizes understanding of pain scale, expected course of stay and plan of care. Surgical site verified with patient. IV access established.

## 2022-09-01 NOTE — OR NURSING
1534 Arrived to PACU. Report received from anesthesia/OR circulator. Patient care assumed.     1534 Sleeping, respirations spontaneous and non-labored.      1538 JAYNE Kelley stated CXR not needed in PACU.    1548 Glasses returned to patients face.      1559 Dr. Rahman at bedside to speak with patient.    1608 Zofran given for relief of nausea.     1635 Patient met criteria for transfer to stage II via gurney with CNA assist. Patient states good pain control. Patient states nausea resolved, tolerating po well. Report called to Ameena GODOY.

## 2022-09-01 NOTE — ANESTHESIA TIME REPORT
Anesthesia Start and Stop Event Times     Date Time Event    9/1/2022 1305 Ready for Procedure     1340 Anesthesia Start     1537 Anesthesia Stop        Responsible Staff  09/01/22    Name Role Begin End    Miguel Low M.D. Anesth 1340 1537        Overtime Reason:  no overtime (within assigned shift)    Comments:

## 2022-09-03 PROBLEM — R91.8 LUNG MASS: Status: ACTIVE | Noted: 2022-09-03

## 2022-09-03 ASSESSMENT — ENCOUNTER SYMPTOMS
SHORTNESS OF BREATH: 1
EYE DISCHARGE: 0
BRUISES/BLEEDS EASILY: 0
MEMORY LOSS: 0
SORE THROAT: 0
COUGH: 0
HEADACHES: 0
EYE REDNESS: 0
FEVER: 0
DIZZINESS: 0
MYALGIAS: 0
WHEEZING: 0
VOMITING: 0
PALPITATIONS: 0
WEIGHT LOSS: 0
NAUSEA: 0
CHILLS: 0

## 2022-09-04 NOTE — H&P
Pulmonary History & Physical Note    Date of Service  9/3/2022    Primary Care Physician  Yovany Packer M.D.    Code Status  No Order    Chief Complaint  Bronchoscopy       Background:  -has a hx of testicular cancer in 1984 (16-16 yo), had a mass attached to the left kidney and was removed with LAD, and also the testicle in 1984, then in Dec of 1984 was found to have a RUL lesion, had a toracotomy with wedge resection and was free of malignancy.    -in March 2020 had hemoptysis with bronchitis and since then has been coughing since then but not as bad  -In Jan 2021 had covid and make his symptoms of SOB, cough significantly worse since then (last 16 months)  -He complains of fatigue, but no fevers , no chills, no weight loss.  -workup with CT chest showed a lung mass in the RML, triggered by workup of his cough of 16  Months and SOB. It was recommended a bronchoscopy.       History of Presenting Illness  Babatunde Sullivan is a 56 y.o. male who presented 9/1/2022 for a planned outpatient bronchoscopy.  In short, Babatunde Sullivan is a 56 y.o. with a past medical history of testicular cancer in 1984, at that time it was thought that he had an metastasis in the right hemithorax for what he underwent thoracotomy with pulmonary wedge resection (procedure and results of biopsy are not available to us) but patient was told that it was negative for malignancy.  He has been doing well until recently that started having shortness of breath and cough that triggered work-up with imaging testing.  He was found to have a right hilar mass and lymphadenopathy concerning for malignancy per last CT of the chest.  He was recommended to follow-up with pulmonology, given the location of the abnormalities in the CT I wonder if there is a degree of obstruction produced by scar tissue from prior thoracotomy, nonetheless we need to rule out that there is no malignant process present.  We had a lengthy conversation about possible  options such as obtaining a PET scan to determine if this area is PET avid and/or if there is any other areas in the body that will light up that could facilitate tissue sampling.  Also discussed how to obtain tissue sampling via percutaneous biopsy through IR versus bronchoscopy, we discussed pros and cons of each 1 of these modalities and patient decided to proceed with a PET scan first and then plan for a bronchoscopy.  We discussed possible complications of bronchoscopy including bleeding, pneumothorax, infection, arrhythmias, death, need to remain intubated in the ICU postprocedure among others patient will like to proceed and gave his verbal consent.     No GERD symptoms  No history of respiratory failure requiring mechanical ventilation  No Personal history of non-resolving or recurrent pneumonia  No Personal history of DVT or pulmonary embolism  No Personal history of recurrent sinusitis or epistaxis/purulent discharge  No reported NSAID precipitated cough, wheeze or sinus congestion    No cold air intolerance  No exercise induced cough wheeze  No family hx of atopy and or asthma  No history of nasal polyps  No hx of recent COVID-19 infection     Pulmonary History:  Inhaler Regimen before this clinic visit: none  Tobacco use:  Never smoker, mom and dad smoked inside the house while growing up.  Occupational exposure:  at a [employer]  Dust/Mold Exposure: none  Pet(s) exposure: 1 dogs  TB exposure: last test was jan 2022, negative PPD     MMRC Dyspnea Scale  Grade  Description of Breathlessness   0  I only get breathless with strenuous exercise.   1  I get short of breath when hurrying on level ground or walking up a slight hill.   2  On level ground, I walk slower than people of the same age because of breathlessness, or have to stop for breath when walking at my own pace.   3  I stop for breath after walking about 100 yards or after a few minutes on level ground.   4  I am too breathless to leave the  house or I am breathless when dressing.      Influenza Vaccine:  none  COVID vaccine: none  Pneumovax:  none      Review of Systems  Review of Systems   Constitutional:  Negative for chills, fever and weight loss.   HENT:  Negative for congestion, nosebleeds and sore throat.    Eyes:  Negative for discharge and redness.   Respiratory:  Positive for shortness of breath. Negative for cough and wheezing.    Cardiovascular:  Negative for chest pain, palpitations and leg swelling.   Gastrointestinal:  Negative for nausea and vomiting.   Genitourinary:  Negative for dysuria.   Musculoskeletal:  Negative for myalgias.   Skin:  Negative for rash.   Neurological:  Negative for dizziness and headaches.   Endo/Heme/Allergies:  Does not bruise/bleed easily.   Psychiatric/Behavioral:  Negative for memory loss.    All other systems reviewed and are negative.    Past Medical History   has a past medical history of Anesthesia, Arthritis, Back pain, Back pain, Bronchitis (03/2021), Cancer ([facility]) (1984), Carcinoma in situ of respiratory system, Chickenpox, Cough, Heart burn, Influenza, Obesity, PONV (postoperative nausea and vomiting), Ringing in ears, Tonsillitis, Wears glasses, and Wheezing.    Surgical History   has a past surgical history that includes tonsillectomy (1972); arthroscopy, knee (Right, 2006); other (Left, 1984); wedge resection lung (Right, 1984); henrietta by laparoscopy (2002); mass excision general (Left, 1984); pr bronchoscopy,diagnostic (N/A, 9/1/2022); and endobronchial us add-on (9/1/2022).     Family History  family history includes Cancer in his father; Hypertension in his father; Stroke in his father.   Family history reviewed with patient. There is no family history that is pertinent to the chief complaint.     Social History   reports that he has never smoked. He has never used smokeless tobacco. He reports that he does not currently use alcohol after a past usage of about 10.8 oz per week. He reports that he  does not currently use drugs after having used the following drugs: Marijuana and Oral.    Allergies  Allergies   Allergen Reactions    Mefoxin Swelling     .       Medications  Prior to Admission Medications   Prescriptions Last Dose Informant Patient Reported? Taking?   Ascorbic Acid (VITAMIN C) 1000 MG Tab 8/31/2022 at 0800  Yes No   Sig: Take  by mouth every day.   KLOR-CON M20 20 MEQ Tab CR 8/31/2022 at 0800  Yes No   Sig: Take 80 mEq by mouth every day.   Multiple Vitamin (MULTIVITAMINS PO) 8/31/2022 at 0800  Yes No   Sig: Take  by mouth every day.   Psyllium 0.52 GM Cap 8/31/2022 at 0800  Yes No   Sig: Take  by mouth 1 time a day as needed.   fluticasone-salmeterol (ADVAIR HFA) 115-21 MCG/ACT inhaler 8/31/2022 at 0800  No No   Sig: Inhale 1 Puff 2 times a day.   hydroCHLOROthiazide (HYDRODIURIL) 50 MG Tab 8/31/2022 at 0800  Yes No   Sig: TAKE 1 & 1/2 (ONE & ONE-HALF) TABLETS BY MOUTH ONCE DAILY FOR EDEMA   omeprazole (PRILOSEC) 20 MG delayed-release capsule 8/31/2022 at 0800  Yes No   Sig: Take 20 mg by mouth every day.      Facility-Administered Medications: None       Physical Exam     Blood Pressure: 117/63   Temperature: 36 °C (96.8 °F)   Pulse: 65   Respiration: 18   Pulse Oximetry: 92 %       Physical Exam  Vitals and nursing note reviewed.   Constitutional:       General: He is not in acute distress.     Appearance: Normal appearance. He is not toxic-appearing.   HENT:      Head: Normocephalic and atraumatic.      Nose: Nose normal.      Mouth/Throat:      Mouth: Mucous membranes are moist.   Eyes:      Extraocular Movements: Extraocular movements intact.   Cardiovascular:      Rate and Rhythm: Normal rate and regular rhythm.      Pulses: Normal pulses.      Heart sounds: No murmur heard.    No friction rub. No gallop.   Pulmonary:      Effort: Pulmonary effort is normal.      Breath sounds: Normal breath sounds.   Abdominal:      General: Bowel sounds are normal.   Musculoskeletal:         General:  Normal range of motion.      Cervical back: Normal range of motion. No rigidity.      Right lower leg: No edema.      Left lower leg: No edema.   Skin:     General: Skin is warm.      Capillary Refill: Capillary refill takes less than 2 seconds.      Findings: No rash.   Neurological:      General: No focal deficit present.      Mental Status: He is alert and oriented to person, place, and time.   Psychiatric:         Mood and Affect: Mood normal.       Laboratory:          No results for input(s): ALTSGPT, ASTSGOT, ALKPHOSPHAT, TBILIRUBIN, DBILIRUBIN, GAMMAGT, AMYLASE, LIPASE, ALB, PREALBUMIN, GLUCOSE in the last 72 hours.      No results for input(s): NTPROBNP in the last 72 hours.      No results for input(s): TROPONINT in the last 72 hours.    Imaging:  PET scan 8/18/22  IMPRESSION:     1.  Right hilar mass is again identified which is difficult to distinguish from the consolidated and atelectatic lung distal to the mass extending out to the pleural surface. There are some focal areas of elevated activity within the mass as described   above. Maximum activity level is 8.6 SUV. Differential diagnosis does include lung carcinoma. Metastatic carcinoma is also possible. Inflammatory or infectious lesion is also in the differential diagnosis. Noted calcifications are also present.     2.  The consolidated atelectatic lung surrounding the major fissure in the right middle lobe and right lower lobe, which possibly includes a small amount of fluid in the major fissure located distal to the mass appears decreased slightly in thickness and   volume compared to the prior chest CT.     3.  No mediastinal or left hilar adenopathy. No elevated activity within these nodes.     4.  Postsurgical changes noted in the abdomen and pelvis.     5.  No PET/CT evidence of metastatic disease in the neck abdomen or pelvis.     6.  Limited areas of peripheral pulmonary interstitial opacification are unchanged with no elevated  activity.      Assessment/Plan:    Lung mass- (present on admission)  Assessment & Plan  See anesthesia team  Proceed to bronchoscopy when ready    Adam Rahman MD FACP  Pulmonary/Critical Care

## 2022-09-06 ENCOUNTER — PATIENT MESSAGE (OUTPATIENT)
Dept: SLEEP MEDICINE | Facility: MEDICAL CENTER | Age: 57
End: 2022-09-06
Payer: COMMERCIAL

## 2022-09-07 NOTE — PATIENT COMMUNICATION
Called and spoke with pt's pharmacy. They did receive RX but still it's stating it needs a PA..    Submitted PA for Brennan Sullivan (Key: [id number])  [employer] is reviewing your PA request and will respond within 24 hours for Medicaid or up to 72 hours for non-Medicaid plans, based on the required timeframe determined by state or federal regulations. To check for an update later, open this request from your dashboard.

## 2022-09-08 ENCOUNTER — PATIENT MESSAGE (OUTPATIENT)
Dept: SLEEP MEDICINE | Facility: MEDICAL CENTER | Age: 57
End: 2022-09-08
Payer: COMMERCIAL

## 2022-09-09 NOTE — PATIENT COMMUNICATION
DOCUMENTATION OF PAR STATUS:    1. Name of Medication & Dose: Advair -21mcg      2. Name of Prescription Coverage Company & phone #: [employer]     3. Date Prior Auth Submitted: 09/07/22    4. What information was given to obtain insurance decision? clinical    5. Prior Auth Letter Approved or Denied? Denied. Coverage is provided for asthma or reactive airway disease, COPD, emphysema, chronic bronchitis and postinfectious cough.  Please note: Postinfectious cough is cough that persists after an acute respiratory infection has resolved. Coverage cannot be authorized at this time.     6. Action Taken: Pharmacy/Patient Notified: Yes-Pt        >>>Dr Rahman: when submitting PA, I didn't see Dx's above in pt;s cahrt.  Please advise. Would you like to appeal or change medication.?

## 2022-09-12 ENCOUNTER — TELEPHONE (OUTPATIENT)
Dept: HEMATOLOGY ONCOLOGY | Facility: MEDICAL CENTER | Age: 57
End: 2022-09-12
Payer: COMMERCIAL

## 2022-09-12 ENCOUNTER — TELEPHONE (OUTPATIENT)
Dept: SLEEP MEDICINE | Facility: MEDICAL CENTER | Age: 57
End: 2022-09-12
Payer: COMMERCIAL

## 2022-09-12 ENCOUNTER — PATIENT OUTREACH (OUTPATIENT)
Dept: ONCOLOGY | Facility: MEDICAL CENTER | Age: 57
End: 2022-09-12
Payer: COMMERCIAL

## 2022-09-12 DIAGNOSIS — C34.91 ADENOCARCINOMA OF RIGHT LUNG (HCC): ICD-10-CM

## 2022-09-12 DIAGNOSIS — Z65.8 PSYCHOSOCIAL DISTRESS: ICD-10-CM

## 2022-09-12 NOTE — PROGRESS NOTES
Referral from Dr Rahman to assist navigating patient.  Reached out to oncology medical group regarding referral.  They reviewed and will get patient scheduled.  Call placed to patient cell number and home.  Left messages. Epic note states that patient cannot have his cell due to his employment during work hours.  Left message on wife's number as well.     Sent introduction letter and message via [employer]    Pt returned call, left message. He reports does not work tomorrow so should also be available.  I    Returned call.I    Cancer Nurse Navigation Assessment      Assessment/Discussion: Introduced self and explained role of cancer nurse navigator.  Pt denies barriers and just anxious to get things going.  Scored self 2/10 on oncology distress scale.  Let him know that oncology medical group will be reaching out to him to get scheduled.  Of the times given to navigator earlier today, he preferred Thursday at 8 am.  Pt states transportation is not an issue or time it takes to come from [place].  Contact information provided.    Barriers to Care:  TRANSPORTATION: no barrier  EMPLOYMENT:   FINANCIAL:  no barrier  INSURANCE:  yes  SUPPORT SYSTEM:    PSYCHOLOGICAL:   No barrier    DST: 2  COMMUNICATION:   no barrier  FAMILY CARE:  no barrier  SELF CARE:  no barrier    Resources Provided/Intervention:  ONN info given and assistance with care coordination    Outcome: Information provided by [facility] oncology medical group was that patient was scheduled.    Pt scheduled for Thursday 9/15 at 8 am

## 2022-09-12 NOTE — TELEPHONE ENCOUNTER
"I finally was able to contact Mr Sullivan regarding the results of the bronchoscopy (called several times prior), had left a couple of voice mails but was able to talk to him about the results.   The path reports is reading for moderatelly differentiated adenocarcinoma from RML samples, but also 11R.   Orders placed for referral to oncology,  and mri brain. He had a recent pet a few weeks ago.  Answered all of his questions  Also contacted Patricia Adler via voalte.      Reading:  \"FINAL DIAGNOSIS:     A. Right middle lobe, forceps biopsy:          Positive for moderately differentiated non-small cell           adenocarcinoma.          Please see comment.   B. Right middle lobe, brushing brush head with touch prep slides:          Atypical cells present suspicious for malignancy.   C. Right lower lobe posterior segment brushing brush head with touch   prep slides:          Bronchial epithelial cells some with reactive changes, scattered           macrophages, lymphocytes, degenerated cells, mucus and blood.          Few rare atypical cells present which cannot be further           characterized.   D. 11R, fine needle aspiration slides:          Positive for malignant cells.   E. 11R, fine needle aspiration core:          Positive for moderately differentiated non-small cell           adenocarcinoma morphologically resembles the tumor in specimen           A.          No lymph node tissue identified in sections examined.     Comment: The above specimen A demonstrates moderately differentiated   adenocarcinoma with morphologic features and immunohistochemical   profile (tumor cells positive for AE1/AE3, CK20, CDX-2, focally   positive for mucicarmine, and focally positive for TTF-1) which can be   seen in tumors of colorectal origin (CK20 and CDX-2 positivity),   however tumor cells are also partially patchy positive for TTF-1 which   is seen in tumors of lung origin. Therefore, the differential diagnosis   includes " "enteric-type pulmonary adenocarcinoma vs to rule out   metastatic colorectal adenocarcinoma. Clinical and radiologic   correlation is recommended for further evaluation. There is a   sufficient amount of tissue for ancillary studies in paraffin block A1   and possibly E1. Pertinent slides were also reviewed by another   pathologist, Dr. Barr, who agreed with the above interpretation\"    Adam Rahman MD FACP  Pulmonary/Critical Care   "

## 2022-09-12 NOTE — LETTER
[facility]   [address]  [place] [address]  Phone: [phone] - Fax: [phone]                [address] [place] [address]     Date: 09/12/22    Dear Babatunde,    I am a Cancer Nurse Navigator, a certified oncology nurse. My role is to assess any needs you may have with education, guidance and support. I am available to you and your family through your treatment at [facility].       I am available to address your needs during your journey with the following services:     Care Coordination  I can assist you in facilitating communication between your cancer care treatment team to ensure timely treatment and follow-up.  I can also assist with transition of care back to your primary care provider, or other specialist, as needed.  My goal is to bridge gaps for you throughout the course of your active treatment.       Education Services  Understanding the recommended treatment course by your physician is key. I can provide educational resources personalized to your cancer diagnosis to help you understand your diagnosis and treatment. Please let me know if you would like to receive information about your diagnosis and treatment plan.  I am here to help.     Support Services/Resource Information  [facility] we offer a full scope of support services.  I can assist you with referral information to:  · Cancer Clinical Trials & Research  · Nutrition counseling  · Support groups  · Complementary Therapies such as Mind-Body Techniques Meditation  · Patient Financial Advocates  ·   · [facility], an [employer] affiliate office, our volunteers can assist you with accessing our lending library, support services information, head coverings and comfort items  · Community and national resources, included eligibility based greta assistance and pharmaceutical access programs, if you are in need of additional information.     [facility]  offers services that include:  · Behavioral Health  · Genetic counseling & testing  · Acupuncture  · Lymphedema prevention/treatment program  · Palliative care services.       I hope you have an excellent patient experience.  Please feel free to share with me your comments regarding the care you have received- we value your feedback.    Sincerely,     Patricia Adler R.N.  Cancer Nurse Navigator    Office: [phone]  Main:  [phone]   Email:  [email or web address]

## 2022-09-13 ENCOUNTER — APPOINTMENT (OUTPATIENT)
Dept: RADIOLOGY | Facility: MEDICAL CENTER | Age: 57
End: 2022-09-13
Attending: INTERNAL MEDICINE
Payer: COMMERCIAL

## 2022-09-13 NOTE — PATIENT COMMUNICATION
Adam Rahman M.D.  You Yesterday (12:33 PM)     FF  Please see telephone encounter   Thank you!   FF

## 2022-09-14 ENCOUNTER — PATIENT MESSAGE (OUTPATIENT)
Dept: SLEEP MEDICINE | Facility: MEDICAL CENTER | Age: 57
End: 2022-09-14
Payer: COMMERCIAL

## 2022-09-14 NOTE — PATIENT COMMUNICATION
Adam Rahman M.D.  You 2 days ago     FF  We can wait for this prescription for now. Will need to focus on the cancer treatment first,   Thank you   FF

## 2022-09-14 NOTE — PROGRESS NOTES
Consult Note: Hematology/Oncology     Primary Care:  Yovany Packer M.D.    CC: Post care for newly diagnosed adenocarcinoma      Current Treatment: None    Prior Treatment: Surgery chemotherapy for metastatic nonseminomatous testicular cancer    Subjective:   History of Presenting Illness:  Babatunde Sullivan is a 56 y.o. male with a past medical history of metastatic testicular cancer treated with surgery and chemotherapy in 1984 presents today to establish care for a new diagnosis of right middle lobe adenocarcinoma.    Patient reports that he has had a year and a half of coughing and shortness of breath.  In March 2020 he had hemoptysis resumed from coughing vigorously.  Since that time he continued to cough.  In January 2021 he had COVID and had an exacerbation of his shortness of breath and cough.  He had a CT chest which noted a lung mass in his right middle lobe he had a bronchoscopy with biopsy which confirmed adenocarcinoma.    Regarding patient's testicular cancer, he was diagnosed at age 18 in 1984.  He had metastases to his left kidney and lung.  He had his testicle removed as well as his left kidney and a thoracotomy with a wedge resection.  He was noted to be DYLAN at that time.    Note patient father had melanoma but passed from sepsis.  There is no other family history of cancer.  The patient is a longterm .  He denies smoking, he does not drink alcohol, and he denies IV drug use.  He lives with his wife and dog.  He has an adopted 23-year-old son who no longer lives at home.    Patient [place] in [place] which is [place] east of [place].  Past Medical History:   Diagnosis Date    Anesthesia     PONV    Arthritis     osteo-lower back    Back pain     Back pain     Bronchitis 03/2021    Bronchitis approx once a year    Cancer (HCC) 1984    testicular    Carcinoma in situ of respiratory system     Chickenpox     Cough     Heart burn     Influenza     Obesity     PONV (postoperative nausea  and vomiting)     Ringing in ears     Tonsillitis     Wears glasses     Wheezing         Past Surgical History:   Procedure Laterality Date    NV BRONCHOSCOPY,DIAGNOSTIC N/A 2022    Procedure: FIBER OPTIC BRONCHOSCOPY WITH  BRUSH, BIOPSY, FINE NEEDLE ASPIRATION AND ENDOBRONCHIAL ULTRASOUND;  Surgeon: Adam Rahman M.D.;  Location: [facility];  Service: Pulmonary    ENDOBRONCHIAL US ADD-ON  2022    Procedure: ENDOBRONCHIAL ULTRASOUND (EBUS);  Surgeon: Adam Rahman M.D.;  Location: SURGERY [facility];  Service: Pulmonary    ARTHROSCOPY, KNEE Right     acl    JOCELINE BY LAPAROSCOPY      scar tissue removed 2 years later ()    OTHER Left     Embrionic carcinoma, testicle removed    WEDGE RESECTION LUNG Right     toracotomy with wedge resection    MASS EXCISION GENERAL Left     Mass removed left kidney    TONSILLECTOMY         Social History     Tobacco Use    Smoking status: Never    Smokeless tobacco: Never    Tobacco comments:     Both of my parents smoked cigarettes   Vaping Use    Vaping Use: Never used   Substance Use Topics    Alcohol use: Not Currently     Alcohol/week: 10.8 oz     Types: 12 Cans of beer, 6 Shots of liquor per week     Comment: Have been on the [employer] for 30 years    Drug use: Not Currently     Types: Marijuana, Oral     Comment: No drugs done in over 30 years        Family History   Problem Relation Age of Onset    Cancer Father             Hypertension Father     Stroke Father        Allergies   Allergen Reactions    Mefoxin Swelling     .       Current Outpatient Medications   Medication Sig Dispense Refill    KLOR-CON M20 20 MEQ Tab CR Take 80 mEq by mouth every day.      Multiple Vitamin (MULTIVITAMINS PO) Take  by mouth every day.      Ascorbic Acid (VITAMIN C) 1000 MG Tab Take  by mouth every day.      Psyllium 0.52 GM Cap Take  by mouth 1 time a day as needed.      hydroCHLOROthiazide (HYDRODIURIL) 50 MG Tab TAKE 1 & 1/2  "(ONE & ONE-HALF) TABLETS BY MOUTH ONCE DAILY FOR EDEMA      omeprazole (PRILOSEC) 20 MG delayed-release capsule Take 20 mg by mouth every day.      fluticasone-salmeterol (ADVAIR HFA) 115-21 MCG/ACT inhaler Inhale 1 Puff 2 times a day. 12 g 4     No current facility-administered medications for this encounter.       Review of Systems   Constitutional:  Negative for chills, fever, malaise/fatigue and weight loss.   HENT:  Negative for congestion, ear pain, nosebleeds and sore throat.    Eyes:  Negative for blurred vision.   Respiratory:  Positive for cough and shortness of breath. Negative for sputum production and wheezing.    Cardiovascular:  Negative for chest pain, palpitations, orthopnea and leg swelling.   Gastrointestinal:  Negative for abdominal pain, heartburn, nausea and vomiting.   Genitourinary:  Negative for dysuria, frequency and urgency.   Musculoskeletal:  Negative for neck pain.   Neurological:  Negative for dizziness, tingling, tremors, sensory change, focal weakness and headaches.   Endo/Heme/Allergies:  Does not bruise/bleed easily.   Psychiatric/Behavioral:  Negative for depression, memory loss and suicidal ideas.    All other systems reviewed and are negative.    Problem list, medications, and allergies reviewed by myself today in Epic.     Objective:     Vitals:    09/15/22 0757   BP: 124/76   BP Location: Left arm   Patient Position: Sitting   BP Cuff Size: Adult   Pulse: 92   Resp: 16   Temp: 36.6 °C (97.8 °F)   TempSrc: Temporal   SpO2: 96%   Weight: 122 kg (269 lb 1.1 oz)   Height: 1.93 m (6' 3.98\")       DESC; KARNOFSKY SCALE WITH ECOG EQUIVALENT: 100, Fully active, able to carry on all pre-disease performed without restriction (ECOG equivalent 0)    DISTRESS LEVEL: no apparent distress    Physical Exam  Constitutional:       General: He is not in acute distress.     Appearance: Normal appearance.   HENT:      Head: Normocephalic and atraumatic.      Nose: Nose normal. No congestion.      " Mouth/Throat:      Mouth: Mucous membranes are moist.      Pharynx: Oropharynx is clear.   Eyes:      General: No scleral icterus.     Conjunctiva/sclera: Conjunctivae normal.      Pupils: Pupils are equal, round, and reactive to light.   Cardiovascular:      Rate and Rhythm: Normal rate and regular rhythm.      Pulses: Normal pulses.      Heart sounds: No murmur heard.    No friction rub.   Pulmonary:      Effort: Pulmonary effort is normal. No respiratory distress.      Breath sounds: Normal breath sounds. No stridor. No wheezing or rales.   Chest:      Chest wall: No tenderness.   Abdominal:      General: Abdomen is flat. Bowel sounds are normal. There is no distension.      Palpations: Abdomen is soft. There is no mass.      Tenderness: There is no abdominal tenderness. There is no guarding or rebound.   Musculoskeletal:         General: No swelling, tenderness or deformity. Normal range of motion.      Cervical back: Normal range of motion and neck supple. No rigidity or tenderness.      Right lower leg: No edema.      Left lower leg: No edema.   Skin:     General: Skin is warm.      Coloration: Skin is not jaundiced or pale.      Findings: No bruising or rash.   Neurological:      General: No focal deficit present.      Mental Status: He is alert and oriented to person, place, and time. Mental status is at baseline.      Motor: No weakness.   Psychiatric:         Mood and Affect: Mood normal.         Behavior: Behavior normal.         Thought Content: Thought content normal.         Judgment: Judgment normal.       Labs:   Most recent labs reviewed.  Please see the lab tab of chart review    Imaging:   Most recent images below have been independently reviewed by me.      Chest  Mediastinum/Amy: There is a right hilar mass/lymphadenopathy with some mass effect on the right anterolateral lower lobe bronchus and encasement of the descending pulmonary artery. Due to postobstructive airspace disease in the right  middle   lobe/anterior segment right lower lobe is difficult to obtain an accurate measurement of the suspected masslike area. The AP diameter is estimated at 3.4 cm.    Brain: PENDING    PET scan:  1.  Right hilar mass is again identified which is difficult to distinguish from the consolidated and atelectatic lung distal to the mass extending out to the pleural surface. There are some focal areas of elevated activity within the mass as described   above. Maximum activity level is 8.6 SUV. Differential diagnosis does include lung carcinoma. Metastatic carcinoma is also possible. Inflammatory or infectious lesion is also in the differential diagnosis. Noted calcifications are also present.     2.  The consolidated atelectatic lung surrounding the major fissure in the right middle lobe and right lower lobe, which possibly includes a small amount of fluid in the major fissure located distal to the mass appears decreased slightly in thickness and   volume compared to the prior chest CT.     3.  No mediastinal or left hilar adenopathy. No elevated activity within these nodes.     4.  Postsurgical changes noted in the abdomen and pelvis.     5.  No PET/CT evidence of metastatic disease in the neck abdomen or pelvis.       Pathology:  FINAL DIAGNOSIS:     A. Right middle lobe, forceps biopsy:          Positive for moderately differentiated non-small cell           adenocarcinoma.          Please see comment.   B. Right middle lobe, brushing brush head with touch prep slides:          Atypical cells present suspicious for malignancy.   C. Right lower lobe posterior segment brushing brush head with touch   prep slides:          Bronchial epithelial cells some with reactive changes, scattered           macrophages, lymphocytes, degenerated cells, mucus and blood.          Few rare atypical cells present which cannot be further           characterized.   D. 11R, fine needle aspiration slides:          Positive for malignant  cells.   E. 11R, fine needle aspiration core:          Positive for moderately differentiated non-small cell           adenocarcinoma morphologically resembles the tumor in specimen           A.          No lymph node tissue identified in sections examined    Assessment/Plan:     Cancer Staging  Adenocarcinoma, lung (HCC)  Staging form: Lung, AJCC 8th Edition  - Clinical stage from 9/15/2022: Stage IIB (cT2a, cN1, cM0) - Signed by Clementine Howe M.D. on 9/15/2022     Diagnosis and Associated Orders:     1. Primary adenocarcinoma of right lung (HCC)  - PULMONARY FUNCTION TESTS -Test requested: Complete Pulmonary Function Test; Future  - Referral to General Surgery    2. Adenocarcinoma of right lung (HCC)    Today we discussed that the patient is a stage IIB based on the fact that his mass is estimated to be 3.4 cm.  Additionally his 11R node was positive for adenocarcinoma.  Stage T2 aN1 M0 puts him at a stage IIb.  We discussed that if there was any metastatic disease found on his brain scan this would alter his staging and he would become a stage IV which treatment discussed today with no longer apply.    Given the stage he would most likely benefit from surgical intervention first followed by cisplatin doublet for 4 cycles consideration for adding atezolizumab if his PD-L1 is positive for 1 year.  If he is determined to not be a surgical candidate then we will consider definitive chemoradiation followed by durvalumab.    Plan  -Referral sent to surgery  -PFTs ordered  -After surgery we will proceed with 4 cycles of cis doublet plus or minus atezolizumab based on his PD-L1 status.  -I have sent his tumor for NGS  -I will see patient back in 6 weeks following surgery.  Brain scan is positive I will see patient back sooner to initiate palliative treatment  -Patient enrolled in the [employer] study    No follow-ups on file.     Any questions and concerns raised by the patient were addressed and answered. Patient  denies any further questions.  Patient encouraged to call the office with any concerns or issues.     Clementine Howe M.D.  Hematology/Oncology    60 minutes was spent on this visit

## 2022-09-15 ENCOUNTER — RESEARCH ENCOUNTER (OUTPATIENT)
Dept: HEMATOLOGY ONCOLOGY | Facility: MEDICAL CENTER | Age: 57
End: 2022-09-15

## 2022-09-15 ENCOUNTER — HOSPITAL ENCOUNTER (OUTPATIENT)
Dept: HEMATOLOGY ONCOLOGY | Facility: MEDICAL CENTER | Age: 57
End: 2022-09-15
Attending: STUDENT IN AN ORGANIZED HEALTH CARE EDUCATION/TRAINING PROGRAM
Payer: COMMERCIAL

## 2022-09-15 VITALS
OXYGEN SATURATION: 96 % | DIASTOLIC BLOOD PRESSURE: 76 MMHG | WEIGHT: 269.07 LBS | HEIGHT: 76 IN | HEART RATE: 92 BPM | TEMPERATURE: 97.8 F | SYSTOLIC BLOOD PRESSURE: 124 MMHG | BODY MASS INDEX: 32.77 KG/M2 | RESPIRATION RATE: 16 BRPM

## 2022-09-15 DIAGNOSIS — C34.91 ADENOCARCINOMA OF RIGHT LUNG (HCC): ICD-10-CM

## 2022-09-15 DIAGNOSIS — C34.91 PRIMARY ADENOCARCINOMA OF RIGHT LUNG (HCC): ICD-10-CM

## 2022-09-15 PROBLEM — C34.90 ADENOCARCINOMA, LUNG (HCC): Status: ACTIVE | Noted: 2022-09-15

## 2022-09-15 PROCEDURE — 99205 OFFICE O/P NEW HI 60 MIN: CPT | Performed by: STUDENT IN AN ORGANIZED HEALTH CARE EDUCATION/TRAINING PROGRAM

## 2022-09-15 PROCEDURE — 99212 OFFICE O/P EST SF 10 MIN: CPT | Performed by: STUDENT IN AN ORGANIZED HEALTH CARE EDUCATION/TRAINING PROGRAM

## 2022-09-15 ASSESSMENT — ENCOUNTER SYMPTOMS
TREMORS: 0
MEMORY LOSS: 0
TINGLING: 0
PALPITATIONS: 0
SENSORY CHANGE: 0
WEIGHT LOSS: 0
SPUTUM PRODUCTION: 0
NECK PAIN: 0
FOCAL WEAKNESS: 0
BRUISES/BLEEDS EASILY: 0
NAUSEA: 0
CHILLS: 0
VOMITING: 0
SHORTNESS OF BREATH: 1
FEVER: 0
HEARTBURN: 0
ORTHOPNEA: 0
SORE THROAT: 0
WHEEZING: 0
DEPRESSION: 0
ABDOMINAL PAIN: 0
DIZZINESS: 0
HEADACHES: 0
COUGH: 1
BLURRED VISION: 0

## 2022-09-15 ASSESSMENT — FIBROSIS 4 INDEX: FIB4 SCORE: 1.42

## 2022-09-15 ASSESSMENT — PAIN SCALES - GENERAL: PAINLEVEL: NO PAIN

## 2022-09-20 ENCOUNTER — HOSPITAL ENCOUNTER (OUTPATIENT)
Dept: RADIOLOGY | Facility: MEDICAL CENTER | Age: 57
End: 2022-09-20
Attending: INTERNAL MEDICINE
Payer: COMMERCIAL

## 2022-09-20 DIAGNOSIS — C34.91 ADENOCARCINOMA OF RIGHT LUNG (HCC): ICD-10-CM

## 2022-09-20 PROCEDURE — 70553 MRI BRAIN STEM W/O & W/DYE: CPT

## 2022-09-20 PROCEDURE — A9576 INJ PROHANCE MULTIPACK: HCPCS | Performed by: INTERNAL MEDICINE

## 2022-09-20 PROCEDURE — 700117 HCHG RX CONTRAST REV CODE 255: Performed by: INTERNAL MEDICINE

## 2022-09-20 RX ADMIN — GADOTERIDOL 20 ML: 279.3 INJECTION, SOLUTION INTRAVENOUS at 08:13

## 2022-09-23 ENCOUNTER — TELEPHONE (OUTPATIENT)
Dept: HEMATOLOGY ONCOLOGY | Facility: MEDICAL CENTER | Age: 57
End: 2022-09-23
Payer: COMMERCIAL

## 2022-09-23 NOTE — TELEPHONE ENCOUNTER
LVM for pt to call back to schedule FV sometime during week of 9/26-9/30 at the patient's convenience.

## 2022-09-24 ENCOUNTER — HOSPITAL ENCOUNTER (OUTPATIENT)
Dept: PULMONOLOGY | Facility: MEDICAL CENTER | Age: 57
End: 2022-09-24
Attending: STUDENT IN AN ORGANIZED HEALTH CARE EDUCATION/TRAINING PROGRAM
Payer: COMMERCIAL

## 2022-09-24 DIAGNOSIS — C34.91 PRIMARY ADENOCARCINOMA OF RIGHT LUNG (HCC): ICD-10-CM

## 2022-09-24 PROCEDURE — 94726 PLETHYSMOGRAPHY LUNG VOLUMES: CPT | Mod: 26 | Performed by: INTERNAL MEDICINE

## 2022-09-24 PROCEDURE — 94729 DIFFUSING CAPACITY: CPT | Mod: 26 | Performed by: INTERNAL MEDICINE

## 2022-09-24 PROCEDURE — 94726 PLETHYSMOGRAPHY LUNG VOLUMES: CPT

## 2022-09-24 PROCEDURE — 94060 EVALUATION OF WHEEZING: CPT | Mod: 26 | Performed by: INTERNAL MEDICINE

## 2022-09-24 PROCEDURE — 94060 EVALUATION OF WHEEZING: CPT

## 2022-09-24 PROCEDURE — 94729 DIFFUSING CAPACITY: CPT

## 2022-09-24 RX ORDER — ALBUTEROL SULFATE 2.5 MG/3ML
2.5 SOLUTION RESPIRATORY (INHALATION)
Status: DISCONTINUED | OUTPATIENT
Start: 2022-09-24 | End: 2022-09-25 | Stop reason: HOSPADM

## 2022-09-24 RX ADMIN — ALBUTEROL SULFATE 2.5 MG: 2.5 SOLUTION RESPIRATORY (INHALATION) at 15:00

## 2022-09-24 ASSESSMENT — PULMONARY FUNCTION TESTS
FEV1_LLN: 3.81
FVC_LLN: 4.95
FVC: 4.57
FEV1_LLN: 3.81
FEV1/FVC_PERCENT_CHANGE: 133
FVC_LLN: 4.95
FEV1: 3.64
FEV1_PERCENT_PREDICTED: 79
FEV1/FVC_PERCENT_CHANGE: 2
FEV1/FVC_PERCENT_PREDICTED: 77
FEV1/FVC: 81
FEV1/FVC_PERCENT_PREDICTED: 105
FEV1/FVC_PERCENT_LLN: 65
FVC_PERCENT_PREDICTED: 77
FEV1/FVC_PERCENT_PREDICTED: 103
FVC: 4.84
FEV1_PERCENT_PREDICTED: 86
FEV1/FVC_PERCENT_PREDICTED: 102
FEV1_PREDICTED: 4.56
FEV1: 3.94
FEV1_PERCENT_CHANGE: 6
FEV1/FVC: 80
FEV1/FVC_PREDICTED: 77
FEV1/FVC_PERCENT_LLN: 65
FEV1_PERCENT_CHANGE: 8
FEV1/FVC: 80
FEV1/FVC: 81.4
FVC_PREDICTED: 5.93
FEV1/FVC_PERCENT_PREDICTED: 106
FVC_PERCENT_PREDICTED: 81

## 2022-09-25 NOTE — PROCEDURES
DATE OF SERVICE:  09/24/2022     PULMONARY FUNCTION TEST INTERPRETATION     REFERRING PHYSICIAN:  Clementine Howe MD     INTERPRETING PHYSICIAN:  Danelle Qureshi MD     REASON FOR STUDY:  Primary adenocarcinoma of the right lung.     STUDY ADEQUACY:  Good efforts; patient met ATS standards by flow volume loop   and expiratory time.     RESULTS:  SPIROMETRY  FVC is 4.57, which is 77% predicted without a significant change   postbronchodilator.  FEV1 is 3.64, which is 79% predicted without a significant change   postbronchodilator.  FEV1/FVC is 81 postbronchodilator.     LUNG VOLUMES:  RV is 2.77, which is 113% predicted.  TLC is 7.42, which is 90% predicted.     DIFFUSION CAPACITY:  DLCO is 32.60, which is 96% predicted.  DL/VA is 5.00, which is 120% predicted.     INTERPRETATION:  1.  Spirometry is normal.  2.  There is no significant change postbronchodilator.  3.  The flow volume loop is consistent with the spirometry data.  4.  Lung volumes are normal.  5.  Diffusion capacity is normal.  6.  There are no prior studies for comparison.        ______________________________  MD FLORESITA Dawson/DOMENIC    DD:  09/25/2022 12:44  DT:  09/25/2022 13:33    Job#:  [id number]

## 2022-09-26 ENCOUNTER — HOSPITAL ENCOUNTER (OUTPATIENT)
Dept: HEMATOLOGY ONCOLOGY | Facility: MEDICAL CENTER | Age: 57
End: 2022-09-26
Attending: STUDENT IN AN ORGANIZED HEALTH CARE EDUCATION/TRAINING PROGRAM
Payer: COMMERCIAL

## 2022-09-26 VITALS
OXYGEN SATURATION: 97 % | TEMPERATURE: 97.6 F | WEIGHT: 270.39 LBS | DIASTOLIC BLOOD PRESSURE: 84 MMHG | RESPIRATION RATE: 16 BRPM | HEART RATE: 71 BPM | BODY MASS INDEX: 32.93 KG/M2 | HEIGHT: 76 IN | SYSTOLIC BLOOD PRESSURE: 122 MMHG

## 2022-09-26 DIAGNOSIS — C34.90 ADENOCARCINOMA OF LUNG, UNSPECIFIED LATERALITY (HCC): ICD-10-CM

## 2022-09-26 DIAGNOSIS — R91.8 LUNG MASS: ICD-10-CM

## 2022-09-26 PROCEDURE — 99212 OFFICE O/P EST SF 10 MIN: CPT | Performed by: STUDENT IN AN ORGANIZED HEALTH CARE EDUCATION/TRAINING PROGRAM

## 2022-09-26 PROCEDURE — 99214 OFFICE O/P EST MOD 30 MIN: CPT | Performed by: STUDENT IN AN ORGANIZED HEALTH CARE EDUCATION/TRAINING PROGRAM

## 2022-09-26 RX ORDER — CYANOCOBALAMIN 1000 UG/ML
1000 INJECTION, SOLUTION INTRAMUSCULAR; SUBCUTANEOUS
Status: CANCELLED | OUTPATIENT
Start: 2022-09-28

## 2022-09-26 RX ORDER — PROCHLORPERAZINE MALEATE 10 MG
10 TABLET ORAL EVERY 6 HOURS PRN
Status: CANCELLED | OUTPATIENT
Start: 2022-09-28

## 2022-09-26 RX ORDER — 0.9 % SODIUM CHLORIDE 0.9 %
10 VIAL (ML) INJECTION PRN
Status: CANCELLED | OUTPATIENT
Start: 2022-09-27

## 2022-09-26 RX ORDER — ONDANSETRON 2 MG/ML
4 INJECTION INTRAMUSCULAR; INTRAVENOUS PRN
Status: CANCELLED | OUTPATIENT
Start: 2022-09-28

## 2022-09-26 RX ORDER — SODIUM CHLORIDE 9 MG/ML
INJECTION, SOLUTION INTRAVENOUS CONTINUOUS
Status: CANCELLED | OUTPATIENT
Start: 2022-09-28

## 2022-09-26 RX ORDER — HEPARIN SODIUM (PORCINE) LOCK FLUSH IV SOLN 100 UNIT/ML 100 UNIT/ML
500 SOLUTION INTRAVENOUS PRN
Status: CANCELLED | OUTPATIENT
Start: 2022-09-28

## 2022-09-26 RX ORDER — 0.9 % SODIUM CHLORIDE 0.9 %
VIAL (ML) INJECTION PRN
Status: CANCELLED | OUTPATIENT
Start: 2022-09-27

## 2022-09-26 RX ORDER — EPINEPHRINE 1 MG/ML(1)
0.5 AMPUL (ML) INJECTION PRN
Status: CANCELLED | OUTPATIENT
Start: 2022-09-28

## 2022-09-26 RX ORDER — 0.9 % SODIUM CHLORIDE 0.9 %
3 VIAL (ML) INJECTION PRN
Status: CANCELLED | OUTPATIENT
Start: 2022-09-27

## 2022-09-26 RX ORDER — DIPHENHYDRAMINE HYDROCHLORIDE 50 MG/ML
50 INJECTION INTRAMUSCULAR; INTRAVENOUS PRN
Status: CANCELLED | OUTPATIENT
Start: 2022-09-28

## 2022-09-26 RX ORDER — 0.9 % SODIUM CHLORIDE 0.9 %
10 VIAL (ML) INJECTION PRN
Status: CANCELLED | OUTPATIENT
Start: 2022-09-28

## 2022-09-26 RX ORDER — 0.9 % SODIUM CHLORIDE 0.9 %
VIAL (ML) INJECTION PRN
Status: CANCELLED | OUTPATIENT
Start: 2022-09-28

## 2022-09-26 RX ORDER — HEPARIN SODIUM (PORCINE) LOCK FLUSH IV SOLN 100 UNIT/ML 100 UNIT/ML
500 SOLUTION INTRAVENOUS PRN
Status: CANCELLED | OUTPATIENT
Start: 2022-09-27

## 2022-09-26 RX ORDER — METHYLPREDNISOLONE SODIUM SUCCINATE 125 MG/2ML
125 INJECTION, POWDER, LYOPHILIZED, FOR SOLUTION INTRAMUSCULAR; INTRAVENOUS PRN
Status: CANCELLED | OUTPATIENT
Start: 2022-09-28

## 2022-09-26 RX ORDER — 0.9 % SODIUM CHLORIDE 0.9 %
3 VIAL (ML) INJECTION PRN
Status: CANCELLED | OUTPATIENT
Start: 2022-09-28

## 2022-09-26 RX ORDER — ONDANSETRON 8 MG/1
8 TABLET, ORALLY DISINTEGRATING ORAL PRN
Status: CANCELLED | OUTPATIENT
Start: 2022-09-28

## 2022-09-26 RX ORDER — SODIUM CHLORIDE 9 MG/ML
1000 INJECTION, SOLUTION INTRAVENOUS ONCE
Status: CANCELLED | OUTPATIENT
Start: 2022-09-28

## 2022-09-26 ASSESSMENT — ENCOUNTER SYMPTOMS
PALPITATIONS: 0
CHILLS: 0
WEIGHT LOSS: 0
ORTHOPNEA: 0
FOCAL WEAKNESS: 0
BLURRED VISION: 0
SPUTUM PRODUCTION: 0
SENSORY CHANGE: 0
DEPRESSION: 0
MEMORY LOSS: 0
HEARTBURN: 0
WHEEZING: 0
DIZZINESS: 0
HEADACHES: 0
FEVER: 0
BRUISES/BLEEDS EASILY: 0
NECK PAIN: 0
NAUSEA: 0
TREMORS: 0
COUGH: 1
TINGLING: 0
SHORTNESS OF BREATH: 1
VOMITING: 0
ABDOMINAL PAIN: 0
SORE THROAT: 0

## 2022-09-26 ASSESSMENT — PAIN SCALES - GENERAL: PAINLEVEL: NO PAIN

## 2022-09-26 ASSESSMENT — FIBROSIS 4 INDEX: FIB4 SCORE: 1.42

## 2022-09-26 NOTE — PROGRESS NOTES
Consult Note: Hematology/Oncology     Primary Care:  Yovany Packer M.D.    CC: Post care for newly diagnosed adenocarcinoma      Current Treatment: None    Prior Treatment: Surgery chemotherapy for metastatic nonseminomatous testicular cancer    Subjective:   History of Presenting Illness:  Babatunde Sullivan is a 56 y.o. male with a past medical history of metastatic testicular cancer treated with surgery and chemotherapy in 1984 presents today to establish care for a new diagnosis of right middle lobe adenocarcinoma.    Patient reports that he has had a year and a half of coughing and shortness of breath.  In March 2020 he had hemoptysis resumed from coughing vigorously.  Since that time he continued to cough.  In January 2021 he had COVID and had an exacerbation of his shortness of breath and cough.  He had a CT chest which noted a lung mass in his right middle lobe he had a bronchoscopy with biopsy which confirmed adenocarcinoma.    Regarding patient's testicular cancer, he was diagnosed at age 18 in 1984.  He had metastases to his left kidney and lung.  He had his testicle removed as well as his left kidney and a thoracotomy with a wedge resection.  He was noted to be DYLAN at that time.    Note patient father had melanoma but passed from sepsis.  There is no other family history of cancer.  The patient is a residential .  He denies smoking, he does not drink alcohol, and he denies IV drug use.  He lives with his wife and dog.  He has an adopted 23-year-old son who no longer lives at home.    Patient lives in [place] minutes east of [place].    Interval History  Today patient presents with his wife.  He has seen Dr. Ganser who advised neoadjuvant chemotherapy prior to surgical intervention.    Patient is reporting that he is feeling well.  He has no current issues.  No worsening headaches change in vision chest pain shortness of breath abdominal pain rashes.  He denies any bleeding  Past  Medical History:   Diagnosis Date    Anesthesia     PONV    Arthritis     osteo-lower back    Back pain     Back pain     Bronchitis 2021    Bronchitis approx once a year    Cancer (HCC) 1984    testicular    Carcinoma in situ of respiratory system     Chickenpox     Cough     Heart burn     Influenza     Obesity     PONV (postoperative nausea and vomiting)     Ringing in ears     Tonsillitis     Wears glasses     Wheezing         Past Surgical History:   Procedure Laterality Date    VA BRONCHOSCOPY,DIAGNOSTIC N/A 2022    Procedure: FIBER OPTIC BRONCHOSCOPY WITH  BRUSH, BIOPSY, FINE NEEDLE ASPIRATION AND ENDOBRONCHIAL ULTRASOUND;  Surgeon: Adam Rahman M.D.;  Location: SURGERY [facility];  Service: Pulmonary    ENDOBRONCHIAL US ADD-ON  2022    Procedure: ENDOBRONCHIAL ULTRASOUND (EBUS);  Surgeon: Adam Rahman M.D.;  Location: SURGERY [facility];  Service: Pulmonary    ARTHROSCOPY, KNEE Right     acl    JOCELINE BY LAPAROSCOPY      scar tissue removed 2 years later ()    OTHER Left     Embrionic carcinoma, testicle removed    WEDGE RESECTION LUNG Right     toracotomy with wedge resection    MASS EXCISION GENERAL Left 1984    Mass removed left kidney    TONSILLECTOMY  1972       Social History     Tobacco Use    Smoking status: Never    Smokeless tobacco: Never    Tobacco comments:     Both of my parents smoked cigarettes   Vaping Use    Vaping Use: Never used   Substance Use Topics    Alcohol use: Not Currently     Alcohol/week: 10.8 oz     Types: 12 Cans of beer, 6 Shots of liquor per week     Comment: Have been on the wagon for 30 years    Drug use: Not Currently     Types: Marijuana, Oral     Comment: No drugs done in over 30 years        Family History   Problem Relation Age of Onset    Cancer Father             Hypertension Father     Stroke Father        Allergies   Allergen Reactions    Mefoxin Swelling     .       Current Outpatient Medications  "  Medication Sig Dispense Refill    KLOR-CON M20 20 MEQ Tab CR Take 80 mEq by mouth every day.      Multiple Vitamin (MULTIVITAMINS PO) Take  by mouth every day.      Ascorbic Acid (VITAMIN C) 1000 MG Tab Take  by mouth every day.      Psyllium 0.52 GM Cap Take  by mouth 1 time a day as needed.      hydroCHLOROthiazide (HYDRODIURIL) 50 MG Tab TAKE 1 & 1/2 (ONE & ONE-HALF) TABLETS BY MOUTH ONCE DAILY FOR EDEMA      omeprazole (PRILOSEC) 20 MG delayed-release capsule Take 20 mg by mouth every day.      fluticasone-salmeterol (ADVAIR HFA) 115-21 MCG/ACT inhaler Inhale 1 Puff 2 times a day. 12 g 4     No current facility-administered medications for this encounter.       Review of Systems   Constitutional:  Negative for chills, fever, malaise/fatigue and weight loss.   HENT:  Negative for congestion, ear pain, nosebleeds and sore throat.    Eyes:  Negative for blurred vision.   Respiratory:  Positive for cough and shortness of breath. Negative for sputum production and wheezing.    Cardiovascular:  Negative for chest pain, palpitations, orthopnea and leg swelling.   Gastrointestinal:  Negative for abdominal pain, heartburn, nausea and vomiting.   Genitourinary:  Negative for dysuria, frequency and urgency.   Musculoskeletal:  Negative for neck pain.   Neurological:  Negative for dizziness, tingling, tremors, sensory change, focal weakness and headaches.   Endo/Heme/Allergies:  Does not bruise/bleed easily.   Psychiatric/Behavioral:  Negative for depression, memory loss and suicidal ideas.    All other systems reviewed and are negative.    Problem list, medications, and allergies reviewed by myself today in Epic.     Objective:     Vitals:    09/26/22 1351   BP: 122/84   BP Location: Left arm   Patient Position: Sitting   BP Cuff Size: Adult   Pulse: 71   Resp: 16   Temp: 36.4 °C (97.6 °F)   TempSrc: Temporal   SpO2: 97%   Weight: 123 kg (270 lb 6.3 oz)   Height: 1.93 m (6' 3.98\")       DESC; KARNOFSKY SCALE WITH ECOG " EQUIVALENT: 100, Fully active, able to carry on all pre-disease performed without restriction (ECOG equivalent 0)    DISTRESS LEVEL: no apparent distress    Physical Exam  Constitutional:       General: He is not in acute distress.     Appearance: Normal appearance.   HENT:      Head: Normocephalic and atraumatic.      Nose: Nose normal. No congestion.      Mouth/Throat:      Mouth: Mucous membranes are moist.      Pharynx: Oropharynx is clear.   Eyes:      General: No scleral icterus.     Conjunctiva/sclera: Conjunctivae normal.      Pupils: Pupils are equal, round, and reactive to light.   Cardiovascular:      Rate and Rhythm: Normal rate and regular rhythm.      Pulses: Normal pulses.      Heart sounds: No murmur heard.    No friction rub.   Pulmonary:      Effort: Pulmonary effort is normal. No respiratory distress.      Breath sounds: Normal breath sounds. No stridor. No wheezing or rales.   Chest:      Chest wall: No tenderness.   Abdominal:      General: Abdomen is flat. Bowel sounds are normal. There is no distension.      Palpations: Abdomen is soft. There is no mass.      Tenderness: There is no abdominal tenderness. There is no guarding or rebound.   Musculoskeletal:         General: No swelling, tenderness or deformity. Normal range of motion.      Cervical back: Normal range of motion and neck supple. No rigidity or tenderness.      Right lower leg: No edema.      Left lower leg: No edema.   Skin:     General: Skin is warm.      Coloration: Skin is not jaundiced or pale.      Findings: No bruising or rash.   Neurological:      General: No focal deficit present.      Mental Status: He is alert and oriented to person, place, and time. Mental status is at baseline.      Motor: No weakness.   Psychiatric:         Mood and Affect: Mood normal.         Behavior: Behavior normal.         Thought Content: Thought content normal.         Judgment: Judgment normal.       Labs:   Most recent labs reviewed.  Please  see the lab tab of chart review    Imaging:   Most recent images below have been independently reviewed by me.      Chest  Mediastinum/Amy: There is a right hilar mass/lymphadenopathy with some mass effect on the right anterolateral lower lobe bronchus and encasement of the descending pulmonary artery. Due to postobstructive airspace disease in the right middle   lobe/anterior segment right lower lobe is difficult to obtain an accurate measurement of the suspected masslike area. The AP diameter is estimated at 3.4 cm.    Brain: PENDING    PET scan:  1.  Right hilar mass is again identified which is difficult to distinguish from the consolidated and atelectatic lung distal to the mass extending out to the pleural surface. There are some focal areas of elevated activity within the mass as described   above. Maximum activity level is 8.6 SUV. Differential diagnosis does include lung carcinoma. Metastatic carcinoma is also possible. Inflammatory or infectious lesion is also in the differential diagnosis. Noted calcifications are also present.     2.  The consolidated atelectatic lung surrounding the major fissure in the right middle lobe and right lower lobe, which possibly includes a small amount of fluid in the major fissure located distal to the mass appears decreased slightly in thickness and   volume compared to the prior chest CT.     3.  No mediastinal or left hilar adenopathy. No elevated activity within these nodes.     4.  Postsurgical changes noted in the abdomen and pelvis.     5.  No PET/CT evidence of metastatic disease in the neck abdomen or pelvis.       Pathology:  FINAL DIAGNOSIS:     A. Right middle lobe, forceps biopsy:          Positive for moderately differentiated non-small cell           adenocarcinoma.          Please see comment.   B. Right middle lobe, brushing brush head with touch prep slides:          Atypical cells present suspicious for malignancy.   C. Right lower lobe posterior segment  brushing brush head with touch   prep slides:          Bronchial epithelial cells some with reactive changes, scattered           macrophages, lymphocytes, degenerated cells, mucus and blood.          Few rare atypical cells present which cannot be further           characterized.   D. 11R, fine needle aspiration slides:          Positive for malignant cells.   E. 11R, fine needle aspiration core:          Positive for moderately differentiated non-small cell           adenocarcinoma morphologically resembles the tumor in specimen           A.          No lymph node tissue identified in sections examined    Brain MRI : negative for malignancy     Assessment/Plan:     Cancer Staging  Adenocarcinoma, lung (HCC)  Staging form: Lung, AJCC 8th Edition  - Clinical stage from 9/15/2022: Stage IIB (cT2a, cN1, cM0) - Signed by Clementine Howe M.D. on 9/15/2022       Diagnosis and Associated Orders:     There are no diagnoses linked to this encounter.    Mr. Sullivan is a 56-year-old male with a new diagnosis of adenocarcinoma.  Dr. Ganser and I spoke after patient was seen in his clinic.  Dr. Ganser reports that after reviewing the images he believes that his tumor is larger than 3.5 cm, and would stage the patient as a Stage 3A. The patient would require at least some middle and lower lobectomy and possibly part of the upper lobe as well.  As such Dr. Ganser asked that I would consider giving the patient neoadjuvant chemotherapy and immunotherapy.  I believe this is the best option for the patient and agree with Dr. Ganser's assessment.    Today I had a long discussion with Mr. Sullivan and his wife.  We discussed the chemotherapy and immunotherapy regiment that would consist of cisplatin, pemetrexed and nivolumab.  We discussed the risks of each of these medications and their mechanism of action.    Plan  -patient to start neoadjuvant chemotherapy  -Patient to follow-up with Dr. Ganser after his post neoadjuvant chemotherapy  scan  -Patient enrolled in the [employer] study    Regimen:  Nivolumab 360 mg IV over 30 minutes on day 1 followed by pemetrexed 500 mg per metered squared IV or 10 minutes on day 1 followed by cisplatin 75 mg per metered squared IV over 2 hours on day 1 hydration is required with supplemental electrolytes pre and post administration of cisplatin)    References:   1 NCCN guidelines for non-small cell lung cancer V.3. 2022  2. Darek MA et al. [place] Journal of Medicine 2022    Any questions and concerns raised by the patient were addressed and answered. Patient denies any further questions.  Patient encouraged to call the office with any concerns or issues.     Clementine Howe M.D.  Hematology/Oncology    60 minutes was spent on this visit

## 2022-10-03 ENCOUNTER — TELEPHONE (OUTPATIENT)
Dept: HEMATOLOGY ONCOLOGY | Facility: MEDICAL CENTER | Age: 57
End: 2022-10-03
Payer: COMMERCIAL

## 2022-10-03 NOTE — TELEPHONE ENCOUNTER
Patient called wanting to know the status of being scheduled for chemotherapy, informed him that Dr. Shyam CHIN is not available at the moment but that I will pass the message to her to follow up with infusion. He agreed and verbalized understanding.

## 2022-10-05 ENCOUNTER — TELEPHONE (OUTPATIENT)
Dept: HEMATOLOGY ONCOLOGY | Facility: MEDICAL CENTER | Age: 57
End: 2022-10-05
Payer: COMMERCIAL

## 2022-10-05 NOTE — TELEPHONE ENCOUNTER
----- Message from Lena Ding R.N. sent at 10/5/2022  8:57 AM PDT -----  Regarding: Chemo Ed?  Marely,    This patient doesn't have a Chemo Ed scheduled.  He starts chemo 10/11.  Looks like he had chemo years ago (1984), but otherwise not sure if it just got overlooked or if Dr. Howe doesn't think he needs it?  Since Anuj is out today, maybe you could look into it?  Thank you.    Lena

## 2022-10-10 ENCOUNTER — HOSPITAL ENCOUNTER (OUTPATIENT)
Dept: HEMATOLOGY ONCOLOGY | Facility: MEDICAL CENTER | Age: 57
End: 2022-10-10
Attending: NURSE PRACTITIONER
Payer: COMMERCIAL

## 2022-10-10 DIAGNOSIS — C34.90 ADENOCARCINOMA OF LUNG, UNSPECIFIED LATERALITY (HCC): ICD-10-CM

## 2022-10-10 PROCEDURE — 99214 OFFICE O/P EST MOD 30 MIN: CPT | Mod: 95 | Performed by: NURSE PRACTITIONER

## 2022-10-10 PROCEDURE — 99211 OFF/OP EST MAY X REQ PHY/QHP: CPT | Performed by: NURSE PRACTITIONER

## 2022-10-10 RX ORDER — LIDOCAINE AND PRILOCAINE 25; 25 MG/G; MG/G
CREAM TOPICAL
Qty: 1 EACH | Refills: 3 | Status: SHIPPED | OUTPATIENT
Start: 2022-10-10 | End: 2022-10-19

## 2022-10-10 RX ORDER — PROCHLORPERAZINE MALEATE 10 MG
10 TABLET ORAL EVERY 6 HOURS PRN
Qty: 30 TABLET | Refills: 6 | Status: ON HOLD | OUTPATIENT
Start: 2022-10-10 | End: 2023-02-01

## 2022-10-10 RX ORDER — ONDANSETRON 4 MG/1
4 TABLET, FILM COATED ORAL EVERY 4 HOURS PRN
Qty: 30 TABLET | Refills: 6 | Status: SHIPPED | OUTPATIENT
Start: 2022-10-10 | End: 2022-11-22

## 2022-10-10 RX ORDER — FOLIC ACID 1 MG/1
1 TABLET ORAL DAILY
Qty: 30 TABLET | Refills: 3 | Status: SHIPPED | OUTPATIENT
Start: 2022-10-05 | End: 2022-12-02 | Stop reason: SDUPTHER

## 2022-10-10 RX ORDER — DEXAMETHASONE 4 MG/1
4 TABLET ORAL 2 TIMES DAILY
Qty: 6 TABLET | Refills: 3 | Status: SHIPPED | OUTPATIENT
Start: 2022-10-09 | End: 2022-10-12

## 2022-10-10 ASSESSMENT — ENCOUNTER SYMPTOMS: COUGH: 1

## 2022-10-10 NOTE — PROGRESS NOTES
Subjective     Babatunde Sullivan is a 56 y.o. male who presents with Cancer (Chemo ed)          HPI    Patient is established with Dr. Howe for non-small cell lung cancer.  He will be starting on Cisplatin, Pemetrexed, and Nivolumab and is here today virtually for a pre-chemotherapy teaching appointment. Visit is conducted as an on demand video visit using Zoom and patient is unaccompanied today.     Allergies   Allergen Reactions    Mefoxin Swelling     .     Current Outpatient Medications on File Prior to Encounter   Medication Sig Dispense Refill    KLOR-CON M20 20 MEQ Tab CR Take 80 mEq by mouth every day.      fluticasone-salmeterol (ADVAIR HFA) 115-21 MCG/ACT inhaler Inhale 1 Puff 2 times a day. 12 g 4    Multiple Vitamin (MULTIVITAMINS PO) Take  by mouth every day.      Ascorbic Acid (VITAMIN C) 1000 MG Tab Take  by mouth every day.      Psyllium 0.52 GM Cap Take  by mouth 1 time a day as needed.      hydroCHLOROthiazide (HYDRODIURIL) 50 MG Tab TAKE 1 & 1/2 (ONE & ONE-HALF) TABLETS BY MOUTH ONCE DAILY FOR EDEMA      omeprazole (PRILOSEC) 20 MG delayed-release capsule Take 20 mg by mouth every day.       No current facility-administered medications on file prior to encounter.       Review of Systems   Respiratory:  Positive for cough.             Objective     There were no vitals taken for this visit.     Physical Exam  Neurological:      Mental Status: He is alert and oriented to person, place, and time.                      Assessment & Plan     1. Adenocarcinoma of lung, unspecified laterality (HCC)                 Patient has non-small cell lung cancer and will be starting on Cisplatin, Pemetrexed and Nivolumab. Educated patient on chemotherapy including but not limited to: Infusion Policies and procedures, cycling of chemotherapy, length of treatment, alopecia, myelosuppression, infection prevention, fatigue, GI distress, use of specific nausea medications, avoidance of alcoholic beverages and  supplement medications, use of sunscreen, chemotherapy precautions at home, and invasive procedures.     Additional info/teaching for immunotherapy patients:  1.  If hospitalized, inform staff of immunotherapy treatment and have them contact oncologist       Additional info/teaching for chemotherapy patients:  1.  Neutropenia reminder card provided to patient        Patient was provided with [facility] side effects sheet. Patient verbalized that questions and concerns regarding treatment have been addressed. Consent to proceed with treatment was reviewed and signed during this visit.      The following appointments, labs, and medications have been provided to the patient:    Line: Peripheral  Labs: CBC, CMP, TSH, and Free T4 with each cycle  Lab Appointments: Prior to chemo   Follow up appts: 10/18 for toxicity check   Chemotherapy class: Virtually today  Nausea medication: Zofran and Compazine sent to pharmacy  Also, discussed with patient pre-medication with Dexamethasone day 0, 2, 3 of treatment  Pain medication: N/A  Nurse patsy: Referral already placed  Dietician/SW: Referral placed      Spent 45 minutes of continuous, non-interrupted, face-to-face patient contact in which greater than 50% of the visit was spent counseling and coordinating of care.       This evaluation was conducted via Zoom using secure and encrypted videoconferencing technology. The patient was in their home in the [facility].    The patient's identity was confirmed and verbal consent was obtained for this virtual visit.

## 2022-10-11 ENCOUNTER — APPOINTMENT (OUTPATIENT)
Dept: HEMATOLOGY ONCOLOGY | Facility: MEDICAL CENTER | Age: 57
End: 2022-10-11
Payer: COMMERCIAL

## 2022-10-11 ENCOUNTER — DOCUMENTATION (OUTPATIENT)
Dept: ONCOLOGY | Facility: MEDICAL CENTER | Age: 57
End: 2022-10-11
Payer: COMMERCIAL

## 2022-10-11 ENCOUNTER — OUTPATIENT INFUSION SERVICES (OUTPATIENT)
Dept: ONCOLOGY | Facility: MEDICAL CENTER | Age: 57
End: 2022-10-11
Attending: STUDENT IN AN ORGANIZED HEALTH CARE EDUCATION/TRAINING PROGRAM
Payer: COMMERCIAL

## 2022-10-11 VITALS
BODY MASS INDEX: 36.52 KG/M2 | HEART RATE: 76 BPM | HEIGHT: 73 IN | WEIGHT: 275.57 LBS | RESPIRATION RATE: 18 BRPM | TEMPERATURE: 97.8 F | SYSTOLIC BLOOD PRESSURE: 134 MMHG | OXYGEN SATURATION: 95 % | DIASTOLIC BLOOD PRESSURE: 83 MMHG

## 2022-10-11 DIAGNOSIS — R91.8 LUNG MASS: ICD-10-CM

## 2022-10-11 LAB
ALBUMIN SERPL BCP-MCNC: 4.1 G/DL (ref 3.2–4.9)
ALBUMIN/GLOB SERPL: 1.6 G/DL
ALP SERPL-CCNC: 79 U/L (ref 30–99)
ALT SERPL-CCNC: 16 U/L (ref 2–50)
ANION GAP SERPL CALC-SCNC: 10 MMOL/L (ref 7–16)
AST SERPL-CCNC: 17 U/L (ref 12–45)
BASOPHILS # BLD AUTO: 0.7 % (ref 0–1.8)
BASOPHILS # BLD: 0.04 K/UL (ref 0–0.12)
BILIRUB SERPL-MCNC: 0.4 MG/DL (ref 0.1–1.5)
BUN SERPL-MCNC: 16 MG/DL (ref 8–22)
CALCIUM SERPL-MCNC: 8.9 MG/DL (ref 8.5–10.5)
CHLORIDE SERPL-SCNC: 103 MMOL/L (ref 96–112)
CO2 SERPL-SCNC: 27 MMOL/L (ref 20–33)
CREAT SERPL-MCNC: 0.64 MG/DL (ref 0.5–1.4)
EOSINOPHIL # BLD AUTO: 0.15 K/UL (ref 0–0.51)
EOSINOPHIL NFR BLD: 2.7 % (ref 0–6.9)
ERYTHROCYTE [DISTWIDTH] IN BLOOD BY AUTOMATED COUNT: 44.9 FL (ref 35.9–50)
GFR SERPLBLD CREATININE-BSD FMLA CKD-EPI: 112 ML/MIN/1.73 M 2
GLOBULIN SER CALC-MCNC: 2.6 G/DL (ref 1.9–3.5)
GLUCOSE SERPL-MCNC: 137 MG/DL (ref 65–99)
HCT VFR BLD AUTO: 43.1 % (ref 42–52)
HGB BLD-MCNC: 14.9 G/DL (ref 14–18)
IMM GRANULOCYTES # BLD AUTO: 0.06 K/UL (ref 0–0.11)
IMM GRANULOCYTES NFR BLD AUTO: 1.1 % (ref 0–0.9)
LYMPHOCYTES # BLD AUTO: 1.58 K/UL (ref 1–4.8)
LYMPHOCYTES NFR BLD: 28.6 % (ref 22–41)
MAGNESIUM SERPL-MCNC: 1.9 MG/DL (ref 1.5–2.5)
MCH RBC QN AUTO: 32.8 PG (ref 27–33)
MCHC RBC AUTO-ENTMCNC: 34.6 G/DL (ref 33.7–35.3)
MCV RBC AUTO: 94.9 FL (ref 81.4–97.8)
MONOCYTES # BLD AUTO: 0.51 K/UL (ref 0–0.85)
MONOCYTES NFR BLD AUTO: 9.2 % (ref 0–13.4)
NEUTROPHILS # BLD AUTO: 3.18 K/UL (ref 1.82–7.42)
NEUTROPHILS NFR BLD: 57.7 % (ref 44–72)
NRBC # BLD AUTO: 0 K/UL
NRBC BLD-RTO: 0 /100 WBC
OUTPT INFUS CBC COMMENT OICOM: ABNORMAL
PLATELET # BLD AUTO: 165 K/UL (ref 164–446)
PMV BLD AUTO: 9.9 FL (ref 9–12.9)
POTASSIUM SERPL-SCNC: 3.5 MMOL/L (ref 3.6–5.5)
PROT SERPL-MCNC: 6.7 G/DL (ref 6–8.2)
RBC # BLD AUTO: 4.54 M/UL (ref 4.7–6.1)
SODIUM SERPL-SCNC: 140 MMOL/L (ref 135–145)
TSH SERPL DL<=0.005 MIU/L-ACNC: 1.07 UIU/ML (ref 0.38–5.33)
WBC # BLD AUTO: 5.5 K/UL (ref 4.8–10.8)

## 2022-10-11 PROCEDURE — 84443 ASSAY THYROID STIM HORMONE: CPT

## 2022-10-11 PROCEDURE — 85025 COMPLETE CBC W/AUTO DIFF WBC: CPT

## 2022-10-11 PROCEDURE — 700102 HCHG RX REV CODE 250 W/ 637 OVERRIDE(OP): Performed by: NURSE PRACTITIONER

## 2022-10-11 PROCEDURE — 96372 THER/PROPH/DIAG INJ SC/IM: CPT

## 2022-10-11 PROCEDURE — 96375 TX/PRO/DX INJ NEW DRUG ADDON: CPT

## 2022-10-11 PROCEDURE — 700105 HCHG RX REV CODE 258: Performed by: STUDENT IN AN ORGANIZED HEALTH CARE EDUCATION/TRAINING PROGRAM

## 2022-10-11 PROCEDURE — A9270 NON-COVERED ITEM OR SERVICE: HCPCS | Performed by: NURSE PRACTITIONER

## 2022-10-11 PROCEDURE — 96415 CHEMO IV INFUSION ADDL HR: CPT

## 2022-10-11 PROCEDURE — 83735 ASSAY OF MAGNESIUM: CPT

## 2022-10-11 PROCEDURE — 96413 CHEMO IV INFUSION 1 HR: CPT

## 2022-10-11 PROCEDURE — 700111 HCHG RX REV CODE 636 W/ 250 OVERRIDE (IP): Performed by: STUDENT IN AN ORGANIZED HEALTH CARE EDUCATION/TRAINING PROGRAM

## 2022-10-11 PROCEDURE — 80053 COMPREHEN METABOLIC PANEL: CPT

## 2022-10-11 PROCEDURE — 96417 CHEMO IV INFUS EACH ADDL SEQ: CPT

## 2022-10-11 PROCEDURE — 96367 TX/PROPH/DG ADDL SEQ IV INF: CPT

## 2022-10-11 PROCEDURE — 96361 HYDRATE IV INFUSION ADD-ON: CPT

## 2022-10-11 RX ORDER — POTASSIUM CHLORIDE 20 MEQ/1
20 TABLET, EXTENDED RELEASE ORAL ONCE
Status: COMPLETED | OUTPATIENT
Start: 2022-10-11 | End: 2022-10-11

## 2022-10-11 RX ORDER — CYANOCOBALAMIN 1000 UG/ML
1000 INJECTION, SOLUTION INTRAMUSCULAR; SUBCUTANEOUS
Status: DISCONTINUED | OUTPATIENT
Start: 2022-10-11 | End: 2022-10-11 | Stop reason: HOSPADM

## 2022-10-11 RX ORDER — SODIUM CHLORIDE 9 MG/ML
1000 INJECTION, SOLUTION INTRAVENOUS ONCE
Status: COMPLETED | OUTPATIENT
Start: 2022-10-11 | End: 2022-10-11

## 2022-10-11 RX ADMIN — POTASSIUM CHLORIDE 20 MEQ: 1500 TABLET, EXTENDED RELEASE ORAL at 10:40

## 2022-10-11 RX ADMIN — SODIUM CHLORIDE 1000 ML: 9 INJECTION, SOLUTION INTRAVENOUS at 10:41

## 2022-10-11 RX ADMIN — SODIUM CHLORIDE 1265 MG: 9 INJECTION, SOLUTION INTRAVENOUS at 14:18

## 2022-10-11 RX ADMIN — SODIUM CHLORIDE 150 MG: 9 INJECTION, SOLUTION INTRAVENOUS at 12:26

## 2022-10-11 RX ADMIN — CISPLATIN 189.8 MG: 1 INJECTION, SOLUTION INTRAVENOUS at 14:39

## 2022-10-11 RX ADMIN — POTASSIUM CHLORIDE 1000 ML: 2 INJECTION, SOLUTION, CONCENTRATE INTRAVENOUS at 16:52

## 2022-10-11 RX ADMIN — DEXAMETHASONE SODIUM PHOSPHATE 12 MG: 4 INJECTION, SOLUTION INTRA-ARTICULAR; INTRALESIONAL; INTRAMUSCULAR; INTRAVENOUS; SOFT TISSUE at 11:44

## 2022-10-11 RX ADMIN — ONDANSETRON 16 MG: 2 INJECTION INTRAMUSCULAR; INTRAVENOUS at 12:01

## 2022-10-11 RX ADMIN — SODIUM CHLORIDE 360 MG: 9 INJECTION, SOLUTION INTRAVENOUS at 13:01

## 2022-10-11 RX ADMIN — CYANOCOBALAMIN 1000 MCG: 1000 INJECTION, SOLUTION INTRAMUSCULAR; SUBCUTANEOUS at 10:57

## 2022-10-11 ASSESSMENT — FIBROSIS 4 INDEX: FIB4 SCORE: 1.42

## 2022-10-11 NOTE — PROGRESS NOTES
"Patient Name: Babatunde Sullivan   Dx: NSCLC (adenocarcinoma)       Protocol: Nivolumab/PEMEtrexed/CISplatin      *Dosing Reference*  Nivolumab 360 mg IV over 30 minutes on Day 1, followed by  PEMEtrexed 500 mg/m² IV over 10 minutes on Day 1, followed by  CISplatin 75 mg/m² IV over 2 hours on Day 1     NCCN guidelines for Non-Small Cell Lung Cancer. V.3.2022.  Darek PM, et al. N Engl J Med. 2022    Allergies:  Mefoxin     /83   Pulse 76   Temp 36.6 °C (97.8 °F) (Temporal)   Resp 18   Ht 1.85 m (6' 0.84\")   Wt 125 kg (275 lb 9.2 oz)   SpO2 95%   BMI 36.52 kg/m²  Body surface area is 2.53 meters squared.    Labs 10/11/22:  ANC~ 3180 Plt = 165k   Hgb = 14.9     SCr = 0.64 mg/dL CrCl > 125 mL/min   AST/ALT/AP = WNL TBili = 0.4  Mag = 1.9  K+ = 3.5  TSH = 1.07   Free T4 = in process         10/11/22 Cyanocobalamin 1000 mcg given, next due 12/13/22     Drug Order   (Drug name, dose, route, IV Fluid & volume, frequency, number of doses) Cycle: 1      Previous treatment: N/A     Medication = Nivolumab  Base Dose= 360 mg   Fixed dose; no calc required = 360 mg  Final Dose = 360 mg   Route = IV  Fluid & Volume =  mL  Admin Duration = Over 60 min          Fixed dose; ok to treat with final dose.    Medication = PEMEtrexed  Base Dose= 500 mg/m2   Calc Dose: Base Dose x 2.53 m2 = 1265 mg   Final Dose = 1265 mg  Route = IV  Fluid & Volume =  mL  Admin Duration = Over 10 min           < 10 % difference, ok to treat with final dose     Medication = CISplatin  Base Dose= 75 mg/m2   Calc Dose:Base Dose x 2.53 m2 = 189.8 mg   Final Dose = 189.8 mg   Route = IV  Fluid & Volume =  mL  Admin Duration = Over 2 hours           < 10 % difference, ok to treat with final dose       By my signature below, I confirm this process was performed independently with the BSA and all final chemotherapy dosing calculations congruent. I have reviewed the above chemotherapy order and that my calculation of the final dose and " BSA (when applicable) corroborate those calculations of the  pharmacist. Discrepancies of 10 % or greater in the written dose have been addressed and documented within the EPIC Progress notes.    Signature: Thelma Landis, PharmD

## 2022-10-11 NOTE — PROGRESS NOTES
"Pharmacy Chemotherapy Verification    Dx: NSCLC  Cycle: 1 (Previous treatment = N/A)  Regimen and Dosing Reference  Nivolumab 360 mg IV over 30 minutes on Day 1, followed by  PEMEtrexed 500 mg/m² IV over 10 minutes on Day 1, followed by  CISplatin 75 mg/m² IV over 2 hours on Day 1  NCCN guidelines for Non-Small Cell Lung Cancer. V.3.2022.  Darek PM, et al. N Engl J Med. 2022    Allergies:Mefoxin  /83   Pulse 76   Temp 36.6 °C (97.8 °F) (Temporal)   Resp 18   Ht 1.85 m (6' 0.84\")   Wt 125 kg (275 lb 9.2 oz)   SpO2 95%   BMI 36.52 kg/m²   Body surface area is 2.53 meters squared.    Labs 10/11/22  ANC 3180 Hgb 14.9 Plt 165k  SCr 0.64 CrCl >125 mL/min   AST/ALT/AP = 17/16/79 Tbili = 0.4  TSH/Free T4 in process    Nivolumab 360 mg fixed dose, no calculation required   <10% difference, OK to treat with final dose = 360 mg IV    PEMEtrexed 500 mg/m2 x 2.53 m2 = 1265 mg   <10% difference, OK to treat with final dose = 1265 mg IV    CISplatin 75 mg/m2 x 2.53 m2 = 189.75 mg   <10% difference, OK to treat with final dose = 189.8 mg IV    Althea Dietz, PharmD, BCOP      "

## 2022-10-11 NOTE — PROGRESS NOTES
Chemotherapy Verification - SECONDARY RN       Height = 1.85 m  Weight = 125 kg  BSA = 2.53 m^2       Medication: nivolumab  Dose: 360 mg (set dose)  Calculated Dose: 360 mg (set dose)                             (In mg/m2, AUC, mg/kg)     Medication: pemetrezed  Dose: 500 mg/m^2  Calculated Dose: 1265 mg                             (In mg/m2, AUC, mg/kg)    Medication: cisplatin  Dose: 75 mg/m^2  Calculated Dose: 189.75 mg                             (In mg/m2, AUC, mg/kg)    I confirm that this process was performed independently.

## 2022-10-11 NOTE — PROGRESS NOTES
Nutrition Services: RD Consultation/ New Chemotherapy Start  Babatunde Sullivan is a 56 y.o. male with diagnosis of adenocarcinoma of lung: Stage IIB: cT2a, cN1, cM0    Past Medical History:   Diagnosis Date    Anesthesia     PONV    Arthritis     osteo-lower back    Back pain     Back pain     Bronchitis 03/2021    Bronchitis approx once a year    Cancer (HCC) 1984    testicular    Carcinoma in situ of respiratory system     Chickenpox     Cough     Heart burn     Influenza     Obesity     PONV (postoperative nausea and vomiting)     Ringing in ears     Tonsillitis     Wears glasses     Wheezing        Past Surgical History:   Procedure Laterality Date    WY BRONCHOSCOPY,DIAGNOSTIC N/A 9/1/2022    Procedure: FIBER OPTIC BRONCHOSCOPY WITH  BRUSH, BIOPSY, FINE NEEDLE ASPIRATION AND ENDOBRONCHIAL ULTRASOUND;  Surgeon: Adam Rahman M.D.;  Location: SURGERY [facility];  Service: Pulmonary    ENDOBRONCHIAL US ADD-ON  9/1/2022    Procedure: ENDOBRONCHIAL ULTRASOUND (EBUS);  Surgeon: Adam Rahman M.D.;  Location: SURGERY [facility];  Service: Pulmonary    ARTHROSCOPY, KNEE Right 2006    acl    JOCELINE BY LAPAROSCOPY  2002    scar tissue removed 2 years later (2004)    OTHER Left 1984    Embrionic carcinoma, testicle removed    WEDGE RESECTION LUNG Right 1984    toracotomy with wedge resection    MASS EXCISION GENERAL Left 1984    Mass removed left kidney    TONSILLECTOMY  1972       RD met with pt to assess current intake, appetite, and nutritional status.  Pt presents to appointment accompanied by spouse. States has received cisplatin before in 2016 mentions side effects at that time were metallic taste changes, constipation, and body aches. States hot oatmeal was the only thing that stimulated a bowel movement. States appetite seems to have decreased within past fee weeks though eats despite this and has not had significant changes in oral intake. Mentions feeling fatigued and activity level is low as a  "result. Denies significant changes to weight.    Pt did not express any further nutrition-related questions or concerns at this time.     Dietary intake pattern:  Consuming three meals per day; includes protein drink Muscle Milk and protein bar Pure Protein; avoids fish, confirms lactose intolerance.     Biochemical/ Anthropometric Assessment:  Treatment Regimen: OP NSC Lung CISplatin + PEMEtrexed + Nivolumab  Pertinent Labs: K 3.5, glucose 137   Pertinent Meds: vitamin B12, compazine, magnesium+potassium chloride, MVI, discontinued vitamin C  Wt Readings from Last 1 Encounters:   10/11/22 125 kg (275 lb 9.2 oz)      Ht Readings from Last 1 Encounters:   10/11/22 1.85 m (6' 0.84\")      BMI Readings from Last 1 Encounters:   10/11/22 36.52 kg/m²   BMI Classification: Obesity Class II  Nutrition-Focused Physical Exam: Appears adequately nourished     Weight History:  Wt Readings from Last 6 Encounters:   10/11/22 125 kg (275 lb 9.2 oz)   09/26/22 123 kg (270 lb 6.3 oz)   09/15/22 122 kg (269 lb 1.1 oz)   09/01/22 121 kg (267 lb 3.2 oz)   08/04/22 123 kg (271 lb)     Weight Change/Malnutrition Risk: wt stable x 2 months minimally per chart     Interventions:  Introduced self and discussed role of dietitian throughout treatment process   Provided and discussed handout regarding general nutrition guidelines as well as nutritional recommendations for patient's with lung cancer, including tips/tricks should side effects of tx occur.   Encouraged balanced diet. Verbalized importance of nutrition and maintaining LBM throughout treatment.   Encouraged physical activity as tolerated with emphasis on reducing long periods of sedentary activity as able. Reinforced increased protein demands.   Increase meal and snack frequency to 4-6 x/day.   Focus on high calorie and protein foods, fruits and vegetables with all meals/snacks - handout provided.  Continue muscle milk and protein bar daily.    Pt reports understanding and was " receptive to information provided.   RD provided contact information. Encouraged pt to reach out as questions/concerns arise.   RD available PRN   [phone]

## 2022-10-11 NOTE — PROGRESS NOTES
Chemotherapy Verification - PRIMARY RN      Height = 1.85m  Weight = 125kg  BSA = 2.53m2       Medication: nivolumab  Dose: flat dose  Calculated Dose: 360mg                             (In mg/m2, AUC, mg/kg)     Medication: pemetrexed 500mg/m2  Dose: 500mg/m2  Calculated Dose: 1265mg                             (In mg/m2, AUC, mg/kg)    Medication: cisplatin  Dose: 75mg/m2  Calculated Dose: 189.75mg                             (In mg/m2, AUC, mg/kg)      I confirm this process was performed independently with the BSA and all final chemotherapy dosing calculations congruent.  Any discrepancies of 10% or greater have been addressed with the chemotherapy pharmacist. The resolution of the discrepancy has been documented in the EPIC progress notes.

## 2022-10-12 ENCOUNTER — TELEPHONE (OUTPATIENT)
Dept: SLEEP MEDICINE | Facility: MEDICAL CENTER | Age: 57
End: 2022-10-12

## 2022-10-12 NOTE — PROGRESS NOTES
[facility] to [facility] for [facility] infusion. PIV placed in right forearm, flushed with + blood return, labs drawn. Patient meets parameters for infusion. K+ replaced with 20KDUR. Prehydration given. Premeds given. Opdivo infused with no s/s of infusion reaction. Alimta infused with no s/s of infusion reaction. Cisplatin infused with no s/s of infusion reaction. Post hydration running. Report given to Remedios MOREL

## 2022-10-12 NOTE — TELEPHONE ENCOUNTER
MEDICATION PRIOR AUTHORIZATION NEEDED:    1. Name of Medication: Advair /21 mcg    2. Requested By (Name of Pharmacy): [facility]     3. Is insurance on file current? yes    4. What is the name & phone number of the 3rd party payor? [employer] [phone]

## 2022-10-12 NOTE — PROGRESS NOTES
Pt presents to [facility] for cisplatin, pemetrexed, and nivolumab. Report received from JAYNE Banks. Post hydration finished infusing with no s/s of adverse reactions. PIV flushed and brisk blood return observed before removing; gauze/coband dressing applied. Next appointment confirmed and education provided. Pt discharged to self care with all personal belongings and in NAD.

## 2022-10-18 ENCOUNTER — APPOINTMENT (OUTPATIENT)
Dept: HEMATOLOGY ONCOLOGY | Facility: MEDICAL CENTER | Age: 57
End: 2022-10-18
Payer: COMMERCIAL

## 2022-10-18 ENCOUNTER — HOSPITAL ENCOUNTER (OUTPATIENT)
Dept: HEMATOLOGY ONCOLOGY | Facility: MEDICAL CENTER | Age: 57
End: 2022-10-18
Attending: NURSE PRACTITIONER
Payer: COMMERCIAL

## 2022-10-18 VITALS
OXYGEN SATURATION: 96 % | TEMPERATURE: 98.3 F | SYSTOLIC BLOOD PRESSURE: 120 MMHG | HEART RATE: 89 BPM | DIASTOLIC BLOOD PRESSURE: 85 MMHG | WEIGHT: 269.84 LBS | HEIGHT: 72 IN | RESPIRATION RATE: 18 BRPM | BODY MASS INDEX: 36.55 KG/M2

## 2022-10-18 DIAGNOSIS — C34.91 PRIMARY ADENOCARCINOMA OF RIGHT LUNG (HCC): ICD-10-CM

## 2022-10-18 DIAGNOSIS — H93.13 TINNITUS OF BOTH EARS: ICD-10-CM

## 2022-10-18 DIAGNOSIS — Z79.899 ENCOUNTER FOR LONG-TERM (CURRENT) USE OF HIGH-RISK MEDICATION: ICD-10-CM

## 2022-10-18 PROCEDURE — 99212 OFFICE O/P EST SF 10 MIN: CPT | Performed by: NURSE PRACTITIONER

## 2022-10-18 PROCEDURE — 99214 OFFICE O/P EST MOD 30 MIN: CPT | Performed by: NURSE PRACTITIONER

## 2022-10-18 ASSESSMENT — FIBROSIS 4 INDEX: FIB4 SCORE: 1.39

## 2022-10-18 ASSESSMENT — ENCOUNTER SYMPTOMS
DIZZINESS: 0
VOMITING: 0
PALPITATIONS: 0
WHEEZING: 0
WEIGHT LOSS: 0
CONSTIPATION: 0
NAUSEA: 0
HEADACHES: 0
FEVER: 0
COUGH: 0
TINGLING: 0
DIARRHEA: 0
CHILLS: 0
MYALGIAS: 0
SHORTNESS OF BREATH: 0

## 2022-10-18 ASSESSMENT — PAIN SCALES - GENERAL: PAINLEVEL: NO PAIN

## 2022-10-18 NOTE — PROGRESS NOTES
Subjective     Babatunde Sullivan is a 56 y.o. male who presents with Cancer (Tox [facility]k)          HPI    Patient seen today in follow-up for non-small cell lung cancer.  Patient presents accompanied by his wife.    Oncology history of presenting illness:  Patient initially presented to Dr. Howe, medical oncologist in September 2022.  He had reported approximately a year and a half of coughing and shortness of breath.  In March 2020 he had noticed hemoptysis from coughing vigorously.  Since then he has noticed the continued cough.  In January 2021 he did have COVID and had an exacerbation of shortness of breath and cough at that time.  On 7/9/2022 patient did undergo a CT chest with contrast for his chronic cough of approximately 15 months ordered by PCP.  CT showed a lung mass in the right middle lobe.  He did have a biopsy of the right middle lobe which was consistent with moderately differentiated non-small cell carcinoma.  He had a PET scan on 8/18/2022 which showed a right hilar mass with an SUV of 8.6.  There was no mediastinal left hilar adenopathy.  They did note postsurgical changes within the abdomen and pelvis, and no evidence of metastatic disease in the neck, abdomen or pelvis.  MRI brain was negative for metastatic disease.    Patient with a known history of testicular cancer at the age of 18 in 1984 with apparent metastasis in the abdomen right lung at that time.  He did have his testicle removed as well as his left kidney, and he did undergo a right thoracotomy for upper lobe lesion with wedge resection in December 1984.  He was noted to be DYLAN at that time.    Patient initially seen by Dr. Ganser, thoracic surgeon who recommended neoadjuvant chemotherapy prior to surgical interventions.  Per Dr. Ganser he believes after reviewing the images that his tumor is larger than 3.5 cm making him a stage IIIa.    Treatment history:  10/11/22: C1D1 Cisplatin, Pemetrexed and Nivolumab    Interval  history:  Patient seen today for toxicity check following his first cycle of cisplatin, pemetrexed with nivolumab.  Patient tolerated treatment well with expected side effects.  He is main complaint is fatigue which he still has present at this time.  He did have some mild nausea, no vomiting which was controlled with antiemetics.  He also complained of worsening tinnitus.  He states that it is intermittent but definitely has worsened since initiation of treatment.      Allergies   Allergen Reactions    Mefoxin Swelling     .       Current Outpatient Medications on File Prior to Encounter   Medication Sig Dispense Refill    prochlorperazine (COMPAZINE) 10 MG Tab Take 1 Tablet by mouth every 6 hours as needed (for nausea, vomiting). 30 Tablet 6    folic acid (FOLVITE) 1 MG Tab Take 1 Tablet by mouth every day for 105 days. Start 5-7 days prior to first cycle of pemetrexed 30 Tablet 3    KLOR-CON M20 20 MEQ Tab CR Take 80 mEq by mouth every day.      Multiple Vitamin (MULTIVITAMINS PO) Take  by mouth every day.      Psyllium 0.52 GM Cap Take  by mouth 1 time a day as needed.      hydroCHLOROthiazide (HYDRODIURIL) 50 MG Tab TAKE 1 & 1/2 (ONE & ONE-HALF) TABLETS BY MOUTH ONCE DAILY FOR EDEMA      omeprazole (PRILOSEC) 20 MG delayed-release capsule Take 20 mg by mouth every day.      ondansetron (ZOFRAN) 4 MG Tab tablet Take 1 Tablet by mouth every four hours as needed for Nausea/Vomiting (for nausea, vomiting). 30 Tablet 6    lidocaine-prilocaine (EMLA) 2.5-2.5 % Cream Apply to port one hour prior to access and cover with plastic wrap. 1 Each 3     No current facility-administered medications on file prior to encounter.            Review of Systems   Constitutional:  Positive for malaise/fatigue. Negative for chills, fever and weight loss.   HENT:  Positive for tinnitus.    Respiratory:  Negative for cough, shortness of breath and wheezing.    Cardiovascular:  Positive for leg swelling (chronic). Negative for chest pain  and palpitations.   Gastrointestinal:  Negative for constipation, diarrhea, nausea and vomiting.   Genitourinary:  Negative for dysuria.   Musculoskeletal:  Negative for myalgias.   Skin:  Negative for itching and rash.   Neurological:  Negative for dizziness, tingling and headaches.            Objective     /85   Pulse 89   Temp 36.8 °C (98.3 °F) (Temporal)   Resp 18   Ht 1.829 m (6')   Wt 122 kg (269 lb 13.5 oz)   SpO2 96%   BMI 36.60 kg/m²      Physical Exam  Vitals reviewed.   Constitutional:       General: He is not in acute distress.     Appearance: Normal appearance. He is not diaphoretic.   HENT:      Head: Normocephalic and atraumatic.   Cardiovascular:      Rate and Rhythm: Normal rate and regular rhythm.      Heart sounds: Normal heart sounds. No murmur heard.    No friction rub. No gallop.   Pulmonary:      Effort: Pulmonary effort is normal. No respiratory distress.      Breath sounds: Normal breath sounds. No wheezing.   Abdominal:      General: Bowel sounds are normal. There is no distension.      Palpations: Abdomen is soft.      Tenderness: There is no abdominal tenderness.   Musculoskeletal:         General: No swelling or tenderness. Normal range of motion.      Right lower leg: Edema present.      Left lower leg: Edema present.      Comments: Chronic and stable lower extremity edema   Skin:     General: Skin is warm and dry.   Neurological:      Mental Status: He is alert and oriented to person, place, and time.   Psychiatric:         Mood and Affect: Mood normal.         Behavior: Behavior normal.                     Assessment & Plan       1. Primary adenocarcinoma of right lung (HCC)  CBC WITH DIFFERENTIAL    Comp Metabolic Panel    FREE THYROXINE    TSH    MAGNESIUM      2. Tinnitus of both ears        3. Encounter for long-term (current) use of high-risk medication  CBC WITH DIFFERENTIAL    Comp Metabolic Panel    FREE THYROXINE    TSH    MAGNESIUM             1. Patient with  non-small cell adenocarcinoma of the right lung.  He has recently started neoadjuvant chemotherapy employing cisplatin, pemetrexed with nivolumab.  Plan to proceed with 3 cycles followed by surgery.  Clinically he has done well and tolerated treatment well.    Patient did have pretreatment tinnitus but it has been significantly worse since treatment.  It is intermittent.  I will discuss further with Dr. Howe and defer to her for treatment options.    I have requested standing lab orders for patient to complete prior to day of chemotherapy.  Patient to follow-up in the clinic in 2 weeks prior to cycle 2, or sooner if needed.      Please note that this dictation was created using voice recognition software. I have made every reasonable attempt to correct obvious errors, but I expect that there are errors of grammar and possibly content that I did not discover before finalizing the note.

## 2022-10-20 NOTE — PROGRESS NOTES
Due to patient's significant tinnitus that occurs daily and is worse from baseline I will switch his chemotherapy regimen to consist of carboplatin area under the curve 6 IV over 30 minutes on day 1 as well as pemetrexed 500 mg per metered squared IV and nivolumab 360 mg IV over 30 minutes on day 1.  This will be 21-day cycles to complete cycle 2 and 3.    Clementine Howe MD

## 2022-10-26 RX ORDER — CYANOCOBALAMIN 1000 UG/ML
1000 INJECTION, SOLUTION INTRAMUSCULAR; SUBCUTANEOUS
Status: CANCELLED | OUTPATIENT
Start: 2022-11-02

## 2022-10-26 RX ORDER — 0.9 % SODIUM CHLORIDE 0.9 %
10 VIAL (ML) INJECTION PRN
Status: CANCELLED | OUTPATIENT
Start: 2022-11-01

## 2022-10-26 RX ORDER — EPINEPHRINE 1 MG/ML(1)
0.5 AMPUL (ML) INJECTION PRN
Status: CANCELLED | OUTPATIENT
Start: 2022-11-02

## 2022-10-26 RX ORDER — 0.9 % SODIUM CHLORIDE 0.9 %
VIAL (ML) INJECTION PRN
Status: CANCELLED | OUTPATIENT
Start: 2022-11-01

## 2022-10-26 RX ORDER — DIPHENHYDRAMINE HYDROCHLORIDE 50 MG/ML
50 INJECTION INTRAMUSCULAR; INTRAVENOUS PRN
Status: CANCELLED | OUTPATIENT
Start: 2022-11-02

## 2022-10-26 RX ORDER — HEPARIN SODIUM (PORCINE) LOCK FLUSH IV SOLN 100 UNIT/ML 100 UNIT/ML
500 SOLUTION INTRAVENOUS PRN
Status: CANCELLED | OUTPATIENT
Start: 2022-11-01

## 2022-10-26 RX ORDER — ONDANSETRON 2 MG/ML
4 INJECTION INTRAMUSCULAR; INTRAVENOUS PRN
Status: CANCELLED | OUTPATIENT
Start: 2022-11-02

## 2022-10-26 RX ORDER — SODIUM CHLORIDE 9 MG/ML
1000 INJECTION, SOLUTION INTRAVENOUS ONCE
Status: CANCELLED | OUTPATIENT
Start: 2022-10-26

## 2022-10-26 RX ORDER — 0.9 % SODIUM CHLORIDE 0.9 %
3 VIAL (ML) INJECTION PRN
Status: CANCELLED | OUTPATIENT
Start: 2022-11-01

## 2022-10-26 RX ORDER — PROCHLORPERAZINE MALEATE 10 MG
10 TABLET ORAL EVERY 6 HOURS PRN
Status: CANCELLED | OUTPATIENT
Start: 2022-11-02

## 2022-10-26 RX ORDER — SODIUM CHLORIDE 9 MG/ML
INJECTION, SOLUTION INTRAVENOUS CONTINUOUS
Status: CANCELLED | OUTPATIENT
Start: 2022-11-02

## 2022-10-26 RX ORDER — ONDANSETRON 8 MG/1
8 TABLET, ORALLY DISINTEGRATING ORAL PRN
Status: CANCELLED | OUTPATIENT
Start: 2022-11-02

## 2022-10-26 RX ORDER — 0.9 % SODIUM CHLORIDE 0.9 %
10 VIAL (ML) INJECTION PRN
Status: CANCELLED | OUTPATIENT
Start: 2022-11-02

## 2022-10-26 RX ORDER — 0.9 % SODIUM CHLORIDE 0.9 %
VIAL (ML) INJECTION PRN
Status: CANCELLED | OUTPATIENT
Start: 2022-11-02

## 2022-10-26 RX ORDER — 0.9 % SODIUM CHLORIDE 0.9 %
3 VIAL (ML) INJECTION PRN
Status: CANCELLED | OUTPATIENT
Start: 2022-11-02

## 2022-10-26 RX ORDER — HEPARIN SODIUM (PORCINE) LOCK FLUSH IV SOLN 100 UNIT/ML 100 UNIT/ML
500 SOLUTION INTRAVENOUS PRN
Status: CANCELLED | OUTPATIENT
Start: 2022-11-02

## 2022-10-26 RX ORDER — METHYLPREDNISOLONE SODIUM SUCCINATE 125 MG/2ML
125 INJECTION, POWDER, LYOPHILIZED, FOR SOLUTION INTRAMUSCULAR; INTRAVENOUS PRN
Status: CANCELLED | OUTPATIENT
Start: 2022-11-02

## 2022-10-29 NOTE — PROGRESS NOTES
"Pharmacy Chemotherapy Verification  Patient Name: Babatunde Sullivan   Dx: NSCLC (adenocarcinoma)       Protocol: Nivolumab/PEMEtrexed/CISplatin      Nivolumab 360 mg IV over 30 minutes on Day 1, followed by  PEMEtrexed 500 mg/m² IV over 10 minutes on Day 1, followed by   -Changed to CARBOplatin AUC 6 iv over 30 minutes on Day 1 starting Cycle 2  21 day cycle for 3 cycles   NCCN guidelines for Non-Small Cell Lung Cancer. V.3.2022.  Darek PM, et al. N Engl J Med. 2022    Allergies:  Mefoxin     BP (!) 150/85   Pulse 92   Temp 36.3 °C (97.4 °F) (Temporal)   Resp 16   Ht 1.85 m (6' 0.84\")   Wt 125 kg (275 lb 2.2 oz)   SpO2 94%   BMI 36.46 kg/m²  Body surface area is 2.53 meters squared.    Labs 10/31/22  ANC~ 2240 Hgb 14.1 Plt 202k  SCr 0.83 CrCl >125 mL/min   AST/ALT/AP = 21/19/71 Tbili = 0.3  TSH 2.62 Free T4 1.14    10/11/22 Cyanocobalamin 1000 mcg given, next due 12/13/22     Drug Order   (Drug name, dose, route, IV Fluid & volume, frequency, number of doses) Cycle: 2  Previous treatment: C1 10/11/22     Medication = Nivolumab  Base Dose= 360 mg   Fixed dose; no calculation required   Final Dose = 360 mg   Route = IV  Fluid & Volume =  mL  Admin Duration = Over 60 minutes          Fixed dose; ok to treat with final dose.    Medication = PEMEtrexed  Base Dose= 500 mg/m2   Calc Dose: Base Dose x 2.53 m2 = 1265 mg   Final Dose = 1265 mg  Route = IV  Fluid & Volume =  mL  Admin Duration = Over 10 minutes           < 10 % difference, ok to treat with final dose     Medication = CARBOplatin  Base Dose= AUC 6  Calc Dose:Base Dose (25 + 125 mL/min) = 900 mg   Final Dose = 900 mg   Route = IV  Fluid & Volume =  mL  Admin Duration = Over 30 minutes          < 10 % difference, ok to treat with final dose       By my signature below, I confirm this process was performed independently with the BSA and all final chemotherapy dosing calculations congruent. I have reviewed the above chemotherapy order and " that my calculation of the final dose and BSA (when applicable) corroborate those calculations of the  pharmacist. Discrepancies of 10 % or greater in the written dose have been addressed and documented within the EPIC Progress notes.    Signature: Althea Dietz, PharmD, BCOP

## 2022-10-31 ENCOUNTER — HOSPITAL ENCOUNTER (OUTPATIENT)
Dept: LAB | Facility: MEDICAL CENTER | Age: 57
End: 2022-10-31
Attending: NURSE PRACTITIONER
Payer: COMMERCIAL

## 2022-10-31 DIAGNOSIS — Z79.899 ENCOUNTER FOR LONG-TERM (CURRENT) USE OF HIGH-RISK MEDICATION: ICD-10-CM

## 2022-10-31 DIAGNOSIS — C34.91 PRIMARY ADENOCARCINOMA OF RIGHT LUNG (HCC): ICD-10-CM

## 2022-10-31 LAB
ALBUMIN SERPL BCP-MCNC: 4 G/DL (ref 3.2–4.9)
ALBUMIN/GLOB SERPL: 1.4 G/DL
ALP SERPL-CCNC: 71 U/L (ref 30–99)
ALT SERPL-CCNC: 19 U/L (ref 2–50)
ANION GAP SERPL CALC-SCNC: 12 MMOL/L (ref 7–16)
AST SERPL-CCNC: 21 U/L (ref 12–45)
BASOPHILS # BLD AUTO: 1.3 % (ref 0–1.8)
BASOPHILS # BLD: 0.05 K/UL (ref 0–0.12)
BILIRUB SERPL-MCNC: 0.3 MG/DL (ref 0.1–1.5)
BUN SERPL-MCNC: 20 MG/DL (ref 8–22)
CALCIUM SERPL-MCNC: 9.4 MG/DL (ref 8.5–10.5)
CHLORIDE SERPL-SCNC: 104 MMOL/L (ref 96–112)
CO2 SERPL-SCNC: 27 MMOL/L (ref 20–33)
CREAT SERPL-MCNC: 0.83 MG/DL (ref 0.5–1.4)
EOSINOPHIL # BLD AUTO: 0.05 K/UL (ref 0–0.51)
EOSINOPHIL NFR BLD: 1.3 % (ref 0–6.9)
ERYTHROCYTE [DISTWIDTH] IN BLOOD BY AUTOMATED COUNT: 45.1 FL (ref 35.9–50)
GFR SERPLBLD CREATININE-BSD FMLA CKD-EPI: 103 ML/MIN/1.73 M 2
GLOBULIN SER CALC-MCNC: 2.8 G/DL (ref 1.9–3.5)
GLUCOSE SERPL-MCNC: 149 MG/DL (ref 65–99)
HCT VFR BLD AUTO: 41.2 % (ref 42–52)
HGB BLD-MCNC: 14.1 G/DL (ref 14–18)
IMM GRANULOCYTES # BLD AUTO: 0.01 K/UL (ref 0–0.11)
IMM GRANULOCYTES NFR BLD AUTO: 0.3 % (ref 0–0.9)
LYMPHOCYTES # BLD AUTO: 1.13 K/UL (ref 1–4.8)
LYMPHOCYTES NFR BLD: 29.6 % (ref 22–41)
MAGNESIUM SERPL-MCNC: 1.7 MG/DL (ref 1.5–2.5)
MCH RBC QN AUTO: 33 PG (ref 27–33)
MCHC RBC AUTO-ENTMCNC: 34.2 G/DL (ref 33.7–35.3)
MCV RBC AUTO: 96.5 FL (ref 81.4–97.8)
MONOCYTES # BLD AUTO: 0.34 K/UL (ref 0–0.85)
MONOCYTES NFR BLD AUTO: 8.9 % (ref 0–13.4)
NEUTROPHILS # BLD AUTO: 2.24 K/UL (ref 1.82–7.42)
NEUTROPHILS NFR BLD: 58.6 % (ref 44–72)
NRBC # BLD AUTO: 0 K/UL
NRBC BLD-RTO: 0 /100 WBC
PLATELET # BLD AUTO: 202 K/UL (ref 164–446)
PMV BLD AUTO: 10.3 FL (ref 9–12.9)
POTASSIUM SERPL-SCNC: 3.6 MMOL/L (ref 3.6–5.5)
PROT SERPL-MCNC: 6.8 G/DL (ref 6–8.2)
RBC # BLD AUTO: 4.27 M/UL (ref 4.7–6.1)
SODIUM SERPL-SCNC: 143 MMOL/L (ref 135–145)
WBC # BLD AUTO: 3.8 K/UL (ref 4.8–10.8)

## 2022-10-31 PROCEDURE — 36415 COLL VENOUS BLD VENIPUNCTURE: CPT

## 2022-10-31 PROCEDURE — 80053 COMPREHEN METABOLIC PANEL: CPT

## 2022-10-31 PROCEDURE — 83735 ASSAY OF MAGNESIUM: CPT

## 2022-10-31 PROCEDURE — 84439 ASSAY OF FREE THYROXINE: CPT

## 2022-10-31 PROCEDURE — 84443 ASSAY THYROID STIM HORMONE: CPT

## 2022-10-31 PROCEDURE — 85025 COMPLETE CBC W/AUTO DIFF WBC: CPT

## 2022-11-01 ENCOUNTER — HOSPITAL ENCOUNTER (OUTPATIENT)
Dept: HEMATOLOGY ONCOLOGY | Facility: MEDICAL CENTER | Age: 57
End: 2022-11-01
Attending: STUDENT IN AN ORGANIZED HEALTH CARE EDUCATION/TRAINING PROGRAM
Payer: COMMERCIAL

## 2022-11-01 ENCOUNTER — OUTPATIENT INFUSION SERVICES (OUTPATIENT)
Dept: ONCOLOGY | Facility: MEDICAL CENTER | Age: 57
End: 2022-11-01
Attending: STUDENT IN AN ORGANIZED HEALTH CARE EDUCATION/TRAINING PROGRAM
Payer: COMMERCIAL

## 2022-11-01 VITALS
HEIGHT: 72 IN | HEART RATE: 80 BPM | SYSTOLIC BLOOD PRESSURE: 116 MMHG | BODY MASS INDEX: 37.36 KG/M2 | RESPIRATION RATE: 16 BRPM | OXYGEN SATURATION: 95 % | DIASTOLIC BLOOD PRESSURE: 74 MMHG | TEMPERATURE: 98.2 F | WEIGHT: 275.8 LBS

## 2022-11-01 VITALS
HEIGHT: 73 IN | OXYGEN SATURATION: 94 % | SYSTOLIC BLOOD PRESSURE: 150 MMHG | HEART RATE: 92 BPM | BODY MASS INDEX: 36.46 KG/M2 | DIASTOLIC BLOOD PRESSURE: 85 MMHG | RESPIRATION RATE: 16 BRPM | TEMPERATURE: 97.4 F | WEIGHT: 275.13 LBS

## 2022-11-01 DIAGNOSIS — C34.90 ADENOCARCINOMA OF LUNG, UNSPECIFIED LATERALITY (HCC): ICD-10-CM

## 2022-11-01 DIAGNOSIS — R91.8 LUNG MASS: ICD-10-CM

## 2022-11-01 LAB
T4 FREE SERPL-MCNC: 1.14 NG/DL (ref 0.93–1.7)
TSH SERPL DL<=0.005 MIU/L-ACNC: 2.62 UIU/ML (ref 0.38–5.33)

## 2022-11-01 PROCEDURE — 700111 HCHG RX REV CODE 636 W/ 250 OVERRIDE (IP): Mod: JW

## 2022-11-01 PROCEDURE — 99214 OFFICE O/P EST MOD 30 MIN: CPT | Performed by: STUDENT IN AN ORGANIZED HEALTH CARE EDUCATION/TRAINING PROGRAM

## 2022-11-01 PROCEDURE — 99212 OFFICE O/P EST SF 10 MIN: CPT | Performed by: STUDENT IN AN ORGANIZED HEALTH CARE EDUCATION/TRAINING PROGRAM

## 2022-11-01 PROCEDURE — 96417 CHEMO IV INFUS EACH ADDL SEQ: CPT

## 2022-11-01 PROCEDURE — 700111 HCHG RX REV CODE 636 W/ 250 OVERRIDE (IP): Performed by: NURSE PRACTITIONER

## 2022-11-01 PROCEDURE — 96367 TX/PROPH/DG ADDL SEQ IV INF: CPT

## 2022-11-01 PROCEDURE — 700105 HCHG RX REV CODE 258

## 2022-11-01 PROCEDURE — 96413 CHEMO IV INFUSION 1 HR: CPT

## 2022-11-01 PROCEDURE — 96375 TX/PRO/DX INJ NEW DRUG ADDON: CPT

## 2022-11-01 PROCEDURE — 700105 HCHG RX REV CODE 258: Performed by: NURSE PRACTITIONER

## 2022-11-01 RX ORDER — 0.9 % SODIUM CHLORIDE 0.9 %
10 VIAL (ML) INJECTION PRN
Status: CANCELLED | OUTPATIENT
Start: 2022-11-22

## 2022-11-01 RX ORDER — ONDANSETRON 8 MG/1
8 TABLET, ORALLY DISINTEGRATING ORAL PRN
Status: CANCELLED | OUTPATIENT
Start: 2022-11-23

## 2022-11-01 RX ORDER — METHYLPREDNISOLONE SODIUM SUCCINATE 125 MG/2ML
125 INJECTION, POWDER, LYOPHILIZED, FOR SOLUTION INTRAMUSCULAR; INTRAVENOUS PRN
Status: CANCELLED | OUTPATIENT
Start: 2022-11-23

## 2022-11-01 RX ORDER — DIPHENHYDRAMINE HYDROCHLORIDE 50 MG/ML
50 INJECTION INTRAMUSCULAR; INTRAVENOUS PRN
Status: CANCELLED | OUTPATIENT
Start: 2022-11-23

## 2022-11-01 RX ORDER — 0.9 % SODIUM CHLORIDE 0.9 %
10 VIAL (ML) INJECTION PRN
Status: CANCELLED | OUTPATIENT
Start: 2022-11-23

## 2022-11-01 RX ORDER — ONDANSETRON 2 MG/ML
4 INJECTION INTRAMUSCULAR; INTRAVENOUS PRN
Status: CANCELLED | OUTPATIENT
Start: 2022-11-23

## 2022-11-01 RX ORDER — HEPARIN SODIUM (PORCINE) LOCK FLUSH IV SOLN 100 UNIT/ML 100 UNIT/ML
500 SOLUTION INTRAVENOUS PRN
Status: CANCELLED | OUTPATIENT
Start: 2022-11-22

## 2022-11-01 RX ORDER — CYANOCOBALAMIN 1000 UG/ML
1000 INJECTION, SOLUTION INTRAMUSCULAR; SUBCUTANEOUS
Status: CANCELLED | OUTPATIENT
Start: 2022-11-23

## 2022-11-01 RX ORDER — 0.9 % SODIUM CHLORIDE 0.9 %
VIAL (ML) INJECTION PRN
Status: CANCELLED | OUTPATIENT
Start: 2022-11-23

## 2022-11-01 RX ORDER — 0.9 % SODIUM CHLORIDE 0.9 %
3 VIAL (ML) INJECTION PRN
Status: CANCELLED | OUTPATIENT
Start: 2022-11-22

## 2022-11-01 RX ORDER — PROCHLORPERAZINE MALEATE 10 MG
10 TABLET ORAL EVERY 6 HOURS PRN
Status: CANCELLED | OUTPATIENT
Start: 2022-11-23

## 2022-11-01 RX ORDER — 0.9 % SODIUM CHLORIDE 0.9 %
VIAL (ML) INJECTION PRN
Status: CANCELLED | OUTPATIENT
Start: 2022-11-22

## 2022-11-01 RX ORDER — SODIUM CHLORIDE 9 MG/ML
INJECTION, SOLUTION INTRAVENOUS CONTINUOUS
Status: CANCELLED | OUTPATIENT
Start: 2022-11-23

## 2022-11-01 RX ORDER — EPINEPHRINE 1 MG/ML(1)
0.5 AMPUL (ML) INJECTION PRN
Status: CANCELLED | OUTPATIENT
Start: 2022-11-23

## 2022-11-01 RX ORDER — 0.9 % SODIUM CHLORIDE 0.9 %
3 VIAL (ML) INJECTION PRN
Status: CANCELLED | OUTPATIENT
Start: 2022-11-23

## 2022-11-01 RX ORDER — CYANOCOBALAMIN 1000 UG/ML
1000 INJECTION, SOLUTION INTRAMUSCULAR; SUBCUTANEOUS
Status: DISCONTINUED | OUTPATIENT
Start: 2022-11-01 | End: 2022-11-01

## 2022-11-01 RX ORDER — DEXAMETHASONE 4 MG/1
TABLET ORAL
COMMUNITY
Start: 2022-10-30 | End: 2023-01-23

## 2022-11-01 RX ORDER — HEPARIN SODIUM (PORCINE) LOCK FLUSH IV SOLN 100 UNIT/ML 100 UNIT/ML
500 SOLUTION INTRAVENOUS PRN
Status: CANCELLED | OUTPATIENT
Start: 2022-11-23

## 2022-11-01 RX ADMIN — CARBOPLATIN 900 MG: 10 INJECTION, SOLUTION INTRAVENOUS at 11:40

## 2022-11-01 RX ADMIN — DEXAMETHASONE SODIUM PHOSPHATE 12 MG: 4 INJECTION, SOLUTION INTRA-ARTICULAR; INTRALESIONAL; INTRAMUSCULAR; INTRAVENOUS; SOFT TISSUE at 08:58

## 2022-11-01 RX ADMIN — SODIUM CHLORIDE 360 MG: 9 INJECTION, SOLUTION INTRAVENOUS at 10:27

## 2022-11-01 RX ADMIN — PEMETREXED DISODIUM 1265 MG: 500 INJECTION, POWDER, LYOPHILIZED, FOR SOLUTION INTRAVENOUS at 12:19

## 2022-11-01 RX ADMIN — ONDANSETRON HYDROCHLORIDE 16 MG: 2 INJECTION, SOLUTION INTRAMUSCULAR; INTRAVENOUS at 09:14

## 2022-11-01 RX ADMIN — FOSAPREPITANT 150 MG: 150 INJECTION, POWDER, LYOPHILIZED, FOR SOLUTION INTRAVENOUS at 09:35

## 2022-11-01 ASSESSMENT — ENCOUNTER SYMPTOMS
BRUISES/BLEEDS EASILY: 0
TINGLING: 0
BLURRED VISION: 0
SORE THROAT: 0
WHEEZING: 0
FEVER: 0
HEARTBURN: 0
TREMORS: 0
NAUSEA: 0
ABDOMINAL PAIN: 0
PALPITATIONS: 0
NECK PAIN: 0
FOCAL WEAKNESS: 0
CHILLS: 0
DEPRESSION: 0
MEMORY LOSS: 0
ORTHOPNEA: 0
SENSORY CHANGE: 0
COUGH: 1
VOMITING: 0
WEIGHT LOSS: 0
HEADACHES: 0
SHORTNESS OF BREATH: 1
SPUTUM PRODUCTION: 0
DIZZINESS: 0

## 2022-11-01 ASSESSMENT — PAIN SCALES - GENERAL: PAINLEVEL: NO PAIN

## 2022-11-01 ASSESSMENT — FIBROSIS 4 INDEX
FIB4 SCORE: 1.29
FIB4 SCORE: 1.29

## 2022-11-01 NOTE — PROGRESS NOTES
Consult Note: Hematology/Oncology     Primary Care:  Yovany Packer M.D.    CC: Post care for newly diagnosed adenocarcinoma      Current Treatment: None    Prior Treatment: Surgery chemotherapy for metastatic nonseminomatous testicular cancer    Subjective:   History of Presenting Illness:  Babatunde Sullivan is a 56 y.o. male with a past medical history of metastatic testicular cancer treated with surgery and chemotherapy in 1984 presents today to establish care for a new diagnosis of right middle lobe adenocarcinoma.    Patient reports that he has had a year and a half of coughing and shortness of breath.  In March 2020 he had hemoptysis resumed from coughing vigorously.  Since that time he continued to cough.  In January 2021 he had COVID and had an exacerbation of his shortness of breath and cough.  He had a CT chest which noted a lung mass in his right middle lobe he had a bronchoscopy with biopsy which confirmed adenocarcinoma.    Regarding patient's testicular cancer, he was diagnosed at age 18 in 1984.  He had metastases to his left kidney and lung.  He had his testicle removed as well as his left kidney and a thoracotomy with a wedge resection.  He was noted to be DYLAN at that time.    Note patient father had melanoma but passed from sepsis.  There is no other family history of cancer.  The patient is a assisted .  He denies smoking, he does not drink alcohol, and he denies IV drug use.  He lives with his wife and dog.  He has an adopted 23-year-old son who no longer lives at home.    Patient [place] in [place] is [place] east of [place].    Interval History  Patient presents today for prechemo check prior to cycle 2 of his neoadjuvant chemotherapy.  He has tolerated carboplatin pemetrexed and nivolumab well.    Today he has no complaints, he denies any headaches, chest pain abdominal pain nausea vomiting diarrhea.  His shortness of breath is stable. Compazine seems to work to keep nausea  at bay.     Patient notes that he continues to have a dull ringing in both ears.  He gets a sharp ringing in 1 ear that lasts <2 minutes.  Its a lower frequency but louder.   Past Medical History:   Diagnosis Date    Anesthesia     PONV    Arthritis     osteo-lower back    Back pain     Back pain     Bronchitis 03/2021    Bronchitis approx once a year    Cancer ([facility]) 1984    testicular    Carcinoma in situ of respiratory system     Chickenpox     Cough     Heart burn     Influenza     Obesity     PONV (postoperative nausea and vomiting)     Ringing in ears     Tonsillitis     Wears glasses     Wheezing         Past Surgical History:   Procedure Laterality Date    MO BRONCHOSCOPY,DIAGNOSTIC N/A 9/1/2022    Procedure: FIBER OPTIC BRONCHOSCOPY WITH  BRUSH, BIOPSY, FINE NEEDLE ASPIRATION AND ENDOBRONCHIAL ULTRASOUND;  Surgeon: Adam Rahman M.D.;  Location: SURGERY [facility];  Service: Pulmonary    ENDOBRONCHIAL US ADD-ON  9/1/2022    Procedure: ENDOBRONCHIAL ULTRASOUND (EBUS);  Surgeon: Adam Rahman M.D.;  Location: SURGERY [facility];  Service: Pulmonary    ARTHROSCOPY, KNEE Right 2006    acl    JOCELINE BY LAPAROSCOPY  2002    scar tissue removed 2 years later (2004)    OTHER Left 1984    Embrionic carcinoma, testicle removed    WEDGE RESECTION LUNG Right 1984    toracotomy with wedge resection    MASS EXCISION GENERAL Left 1984    Mass removed left kidney    TONSILLECTOMY  1972       Social History     Tobacco Use    Smoking status: Never    Smokeless tobacco: Never    Tobacco comments:     Both of my parents smoked cigarettes   Vaping Use    Vaping Use: Never used   Substance Use Topics    Alcohol use: Not Currently     Alcohol/week: 10.8 oz     Types: 12 Cans of beer, 6 Shots of liquor per week     Comment: Have been on the [employer] for 30 years    Drug use: Not Currently     Types: Marijuana, Oral     Comment: No drugs done in over 30 years        Family History   Problem Relation Age of Onset     Cancer Father             Hypertension Father     Stroke Father        Allergies   Allergen Reactions    Mefoxin Swelling     .       Current Outpatient Medications   Medication Sig Dispense Refill    ondansetron (ZOFRAN) 4 MG Tab tablet Take 1 Tablet by mouth every four hours as needed for Nausea/Vomiting (for nausea, vomiting). 30 Tablet 6    prochlorperazine (COMPAZINE) 10 MG Tab Take 1 Tablet by mouth every 6 hours as needed (for nausea, vomiting). 30 Tablet 6    folic acid (FOLVITE) 1 MG Tab Take 1 Tablet by mouth every day for 105 days. Start 5-7 days prior to first cycle of pemetrexed 30 Tablet 3    KLOR-CON M20 20 MEQ Tab CR Take 80 mEq by mouth every day.      Multiple Vitamin (MULTIVITAMINS PO) Take  by mouth every day.      Psyllium 0.52 GM Cap Take  by mouth 1 time a day as needed.      hydroCHLOROthiazide (HYDRODIURIL) 50 MG Tab TAKE 1 & 1/2 (ONE & ONE-HALF) TABLETS BY MOUTH ONCE DAILY FOR EDEMA      omeprazole (PRILOSEC) 20 MG delayed-release capsule Take 20 mg by mouth every day.       No current facility-administered medications for this encounter.       Review of Systems   Constitutional:  Negative for chills, fever, malaise/fatigue and weight loss.   HENT:  Positive for tinnitus. Negative for congestion, ear pain, nosebleeds and sore throat.    Eyes:  Negative for blurred vision.   Respiratory:  Positive for cough (comes and goes) and shortness of breath (improved). Negative for sputum production and wheezing.    Cardiovascular:  Negative for chest pain, palpitations, orthopnea and leg swelling.   Gastrointestinal:  Negative for abdominal pain, heartburn, nausea and vomiting.   Genitourinary:  Negative for dysuria, frequency and urgency.   Musculoskeletal:  Negative for neck pain.   Neurological:  Negative for dizziness, tingling, tremors, sensory change, focal weakness and headaches.   Endo/Heme/Allergies:  Does not bruise/bleed easily.   Psychiatric/Behavioral:  Negative for depression,  memory loss and suicidal ideas.    All other systems reviewed and are negative.    Problem list, medications, and allergies reviewed by myself today in Epic.     Objective:     There were no vitals filed for this visit.      DESC; KARNOFSKY SCALE WITH ECOG EQUIVALENT: 100, Fully active, able to carry on all pre-disease performed without restriction (ECOG equivalent 0)    DISTRESS LEVEL: no apparent distress    Physical Exam  Constitutional:       General: He is not in acute distress.     Appearance: Normal appearance.   HENT:      Head: Normocephalic and atraumatic.      Nose: Nose normal. No congestion.      Mouth/Throat:      Mouth: Mucous membranes are moist.      Pharynx: Oropharynx is clear.   Eyes:      General: No scleral icterus.     Conjunctiva/sclera: Conjunctivae normal.      Pupils: Pupils are equal, round, and reactive to light.   Cardiovascular:      Rate and Rhythm: Normal rate and regular rhythm.      Pulses: Normal pulses.      Heart sounds: No murmur heard.    No friction rub.   Pulmonary:      Effort: Pulmonary effort is normal. No respiratory distress.      Breath sounds: Normal breath sounds. No stridor. No wheezing or rales.   Chest:      Chest wall: No tenderness.   Abdominal:      General: Abdomen is flat. Bowel sounds are normal. There is no distension.      Palpations: Abdomen is soft. There is no mass.      Tenderness: There is no abdominal tenderness. There is no guarding or rebound.   Musculoskeletal:         General: No swelling, tenderness or deformity. Normal range of motion.      Cervical back: Normal range of motion and neck supple. No rigidity or tenderness.      Right lower leg: No edema.      Left lower leg: No edema.   Skin:     General: Skin is warm.      Coloration: Skin is not jaundiced or pale.      Findings: No bruising or rash.   Neurological:      General: No focal deficit present.      Mental Status: He is alert and oriented to person, place, and time. Mental status is  at baseline.      Motor: No weakness.   Psychiatric:         Mood and Affect: Mood normal.         Behavior: Behavior normal.         Thought Content: Thought content normal.         Judgment: Judgment normal.       Labs:   Most recent labs reviewed.  Please see the lab tab of chart review    Imaging:   Most recent images below have been independently reviewed by me.      Chest  Mediastinum/Amy: There is a right hilar mass/lymphadenopathy with some mass effect on the right anterolateral lower lobe bronchus and encasement of the descending pulmonary artery. Due to postobstructive airspace disease in the right middle   lobe/anterior segment right lower lobe is difficult to obtain an accurate measurement of the suspected masslike area. The AP diameter is estimated at 3.4 cm.    Brain: PENDING    PET scan:  1.  Right hilar mass is again identified which is difficult to distinguish from the consolidated and atelectatic lung distal to the mass extending out to the pleural surface. There are some focal areas of elevated activity within the mass as described   above. Maximum activity level is 8.6 SUV. Differential diagnosis does include lung carcinoma. Metastatic carcinoma is also possible. Inflammatory or infectious lesion is also in the differential diagnosis. Noted calcifications are also present.     2.  The consolidated atelectatic lung surrounding the major fissure in the right middle lobe and right lower lobe, which possibly includes a small amount of fluid in the major fissure located distal to the mass appears decreased slightly in thickness and   volume compared to the prior chest CT.     3.  No mediastinal or left hilar adenopathy. No elevated activity within these nodes.     4.  Postsurgical changes noted in the abdomen and pelvis.     5.  No PET/CT evidence of metastatic disease in the neck abdomen or pelvis.       Pathology:  FINAL DIAGNOSIS:     A. Right middle lobe, forceps biopsy:          Positive for  moderately differentiated non-small cell           adenocarcinoma.          Please see comment.   B. Right middle lobe, brushing brush head with touch prep slides:          Atypical cells present suspicious for malignancy.   C. Right lower lobe posterior segment brushing brush head with touch   prep slides:          Bronchial epithelial cells some with reactive changes, scattered           macrophages, lymphocytes, degenerated cells, mucus and blood.          Few rare atypical cells present which cannot be further           characterized.   D. 11R, fine needle aspiration slides:          Positive for malignant cells.   E. 11R, fine needle aspiration core:          Positive for moderately differentiated non-small cell           adenocarcinoma morphologically resembles the tumor in specimen           A.          No lymph node tissue identified in sections examined    Brain MRI : negative for malignancy     Assessment/Plan:     Cancer Staging  Adenocarcinoma, lung (HCC)  Staging form: Lung, AJCC 8th Edition  - Clinical stage from 9/15/2022: Stage IIB (cT2a, cN1, cM0) - Signed by Clementine Howe M.D. on 9/15/2022       Diagnosis and Associated Orders:     There are no diagnoses linked to this encounter.    Mr. Sullivan is a 56-year-old male with a new diagnosis of adenocarcinoma.  Dr. Ganser and I spoke after patient was seen in his clinic.  Dr. Ganser reports that after reviewing the images he believes that his tumor is larger than 3.5 cm, and would stage the patient as a Stage 3A. The patient would require at least some middle and lower lobectomy and possibly part of the upper lobe as well.  As such Dr. Ganser asked that I would consider giving the patient neoadjuvant chemotherapy and immunotherapy.  I believe this is the best option for the patient and agree with Dr. Ganser's assessment.    Given patients ringing in his ears, after first cycle of cisplatin I will switch cisplatin to carboplatin.        Plan  -continue neoadjuvant chemotherapy, now carbo/paclitaxel/Nivo  -I will order a CT chest w contrast after cycle 3   -Patient to follow-up with Dr. Ganser (please have patient make appointment with Dr. Ganser after CT scan)  -Patient enrolled in the [employer] study    Regimen: 3 CYCLES  Nivolumab 360 mg IV over 30 minutes on day 1 followed by pemetrexed 500 mg per metered squared IV or 10 minutes on day 1 followed by carboplatin AUC 6 IV over 30 minutes on day 1    References:   1 NCCN guidelines for non-small cell lung cancer V.3. 2022  2. Darek MA et al. [place] Journal of Medicine 2022    Any questions and concerns raised by the patient were addressed and answered. Patient denies any further questions.  Patient encouraged to call the office with any concerns or issues.     Clementine Howe M.D.  Hematology/Oncology    35 minutes was spent on this visit

## 2022-11-01 NOTE — PROGRESS NOTES
Pt presents to [facility] for Opdivo/Carboplatin/Alimta treatment. Established PIV in R lateral AC; brisk blood return observed and pt tolerated well. Labs drawn from yesterday and are within treatable parameters. Pre meds administered and Opdivo/Carboplatin/Alimta infused with no s/s of adverse reactions. PIV flushed and brisk blood return observed before removing; gauze/coban dressing applied. Next appointment confirmed and education provided. Pt discharged to self care with all personal belongings and in NAD.

## 2022-11-01 NOTE — PROGRESS NOTES
Chemotherapy Verification - SECONDARY RN       Height = 1.85m  Weight = 125kg  BSA = 2.53m2       Medication: carboplatin  Dose: AUC 6  Calculated Dose: 900mg                             (In mg/m2, AUC, mg/kg)     Medication: nivolumab  Dose: flat dose  Calculated Dose: 360mg                             (In mg/m2, AUC, mg/kg)    Medication: pemetrexed  Dose: 500mg/m2  Calculated Dose: 1265mg                             (In mg/m2, AUC, mg/kg)                              (In mg/m2, AUC, mg/kg)      Carboplatin calculation (if applicable):  (6*(125+25)) = 900    I confirm that this process was performed independently.

## 2022-11-01 NOTE — PROGRESS NOTES
"Pharmacy Chemotherapy Verification    Dx: NSCLC  Cycle: 2  (Previous treatment = C1 10/11/22)  Regimen and Dosing Reference  Nivolumab 360 mg IV over 30 minutes on Day 1, followed by  PEMEtrexed 500 mg/m² IV over 10 minutes on Day 1, followed by  Switched to CARBOplatin AUC 6 IV over 30 minutes on Day 1 per Dr. Howe C2 11/1/22  21 day cycle for 3 cycles   NCCN guidelines for Non-Small Cell Lung Cancer. V.3.2022.  Darek PM, et al. N Engl J Med. 2022    Allergies:Mefoxin  BP (!) 150/85   Pulse 92   Temp 36.3 °C (97.4 °F) (Temporal)   Resp 16   Ht 1.85 m (6' 0.84\")   Wt 125 kg (275 lb 2.2 oz)   SpO2 94%   BMI 36.46 kg/m²   Body surface area is 2.53 meters squared.    Labs 10/31/22:  ANC~ 2240 Plt = 202k   Hgb = 14.1     SCr = 0.83mg/dL CrCl ~ 177.65mL/min   LFT's = WNL TBili = 0.3     TSH/Free T4= 2.62/1.14     Last Vit B12 injection = 10/11/22     Nivolumab 360 mg fixed dose, no calculation required   <10% difference, OK to treat with final dose = 360 mg IV    PEMEtrexed 500 mg/m2 x 2.53 m2 = 1265 mg   <10% difference, OK to treat with final dose = 1265 mg IV    CARBOplatin AUC 6 (125+25)  = 900 mg   <10% difference, OK to treat with final dose = 900 mg IV    Malina Gould, PharmD, BCPS        "

## 2022-11-01 NOTE — ADDENDUM NOTE
Encounter addended by: Clementine Howe M.D. on: 11/1/2022 8:21 AM   Actions taken: Clinical Note Signed

## 2022-11-01 NOTE — PROGRESS NOTES
Chemotherapy Verification - PRIMARY RN    C2 D1    Height = 185cm  Weight = 125kg  BSA = 2.53m^2       Medication: carboplatin (Paraplatin)  Dose: AUC=6    Calculated Dose: 1229.317 max dose 900mg                            (In mg/m2, AUC, mg/kg)     Medication: nivolumab (Opdivo)  Set Dose: 360mg    Calculated Dose: (set dose) 360mg                             (In mg/m2, AUC, mg/kg)    Medication: pemetrexed (Alimta)  Dose: 500mg/m^2    Calculated Dose: 1,265mg                             (In mg/m2, AUC, mg/kg)    Carboplatin calculation (if applicable): (((140-54)*125)*(0.7+(2*0.15))/(0.83*72)+25)*6*((2=1)+(2=2)) = 1229.317 max dose 900mg      I confirm this process was performed independently with the BSA and all final chemotherapy dosing calculations congruent.  Any discrepancies of 10% or greater have been addressed with the chemotherapy pharmacist. The resolution of the discrepancy has been documented in the EPIC progress notes.

## 2022-11-01 NOTE — ADDENDUM NOTE
Encounter addended by: Anuj Quiroz on: 11/1/2022 10:20 AM   Actions taken: Charge Capture section accepted

## 2022-11-20 NOTE — PROGRESS NOTES
"Pharmacy Chemotherapy Verification    Dx: NSCLC    Cycle 3  Previous treatment = C2 on 11/1/22    Regimen: Opdivo + Alimta + Cisplatin  *Dosing Reference*  Nivolumab 360 mg IV over 30 minutes on Day 1, followed by  PEMEtrexed 500 mg/m² IV over 10 minutes on Day 1, followed by  -- Switched to CARBOplatin AUC 6 IV over 30 minutes on Day 1 for severe tinitis per Dr. Howe C2 11/1/22  21 day cycle for 3 cycles   NCCN guidelines for Non-Small Cell Lung Cancer. V.3.2022.  Darek PM, et al. N Engl J Med. 2022    Allergies:Mefoxin  BP (!) 144/82   Pulse 86   Temp 36.1 °C (96.9 °F) (Temporal)   Resp 16   Ht 1.85 m (6' 0.84\")   Wt (!) 126 kg (277 lb 5.4 oz)   SpO2 95%   BMI 36.76 kg/m²   Body surface area is 2.54 meters squared.    Last Vit B12 injection = 10/11/22     All labs (11/21/22) and thyroid panel within treatment plan parameters.  SCr = 0.82 CrCl > 125 ml/min      Nivolumab (Opdivo) 360 mg fixed dose  No calculation required, okay to treat with final dose = 360 mg IV    PEMEtrexed (Alimta) 500 mg/m2 x 2.54 m2 = 1270 mg   <10% difference, OK to treat with final dose = 1270 mg IV    CARBOplatin (Paraplatin) AUC 6 (125 ml/min + 25) = 900 mg   <10% difference, OK to treat with final dose = 900 mg IV      Latricia Angel, PharmD  "

## 2022-11-21 ENCOUNTER — HOSPITAL ENCOUNTER (OUTPATIENT)
Dept: LAB | Facility: MEDICAL CENTER | Age: 57
End: 2022-11-21
Attending: NURSE PRACTITIONER
Payer: COMMERCIAL

## 2022-11-21 DIAGNOSIS — C34.91 PRIMARY ADENOCARCINOMA OF RIGHT LUNG (HCC): ICD-10-CM

## 2022-11-21 DIAGNOSIS — Z79.899 ENCOUNTER FOR LONG-TERM (CURRENT) USE OF HIGH-RISK MEDICATION: ICD-10-CM

## 2022-11-21 LAB
ALBUMIN SERPL BCP-MCNC: 4 G/DL (ref 3.2–4.9)
ALBUMIN/GLOB SERPL: 1.5 G/DL
ALP SERPL-CCNC: 76 U/L (ref 30–99)
ALT SERPL-CCNC: 16 U/L (ref 2–50)
ANION GAP SERPL CALC-SCNC: 11 MMOL/L (ref 7–16)
AST SERPL-CCNC: 18 U/L (ref 12–45)
BASOPHILS # BLD AUTO: 0.5 % (ref 0–1.8)
BASOPHILS # BLD: 0.02 K/UL (ref 0–0.12)
BILIRUB SERPL-MCNC: 0.3 MG/DL (ref 0.1–1.5)
BUN SERPL-MCNC: 17 MG/DL (ref 8–22)
CALCIUM SERPL-MCNC: 9.3 MG/DL (ref 8.5–10.5)
CHLORIDE SERPL-SCNC: 100 MMOL/L (ref 96–112)
CO2 SERPL-SCNC: 28 MMOL/L (ref 20–33)
CREAT SERPL-MCNC: 0.82 MG/DL (ref 0.5–1.4)
EOSINOPHIL # BLD AUTO: 0.07 K/UL (ref 0–0.51)
EOSINOPHIL NFR BLD: 1.9 % (ref 0–6.9)
ERYTHROCYTE [DISTWIDTH] IN BLOOD BY AUTOMATED COUNT: 45.2 FL (ref 35.9–50)
GFR SERPLBLD CREATININE-BSD FMLA CKD-EPI: 104 ML/MIN/1.73 M 2
GLOBULIN SER CALC-MCNC: 2.6 G/DL (ref 1.9–3.5)
GLUCOSE SERPL-MCNC: 105 MG/DL (ref 65–99)
HCT VFR BLD AUTO: 36.9 % (ref 42–52)
HGB BLD-MCNC: 12.6 G/DL (ref 14–18)
IMM GRANULOCYTES # BLD AUTO: 0.04 K/UL (ref 0–0.11)
IMM GRANULOCYTES NFR BLD AUTO: 1.1 % (ref 0–0.9)
LYMPHOCYTES # BLD AUTO: 1.49 K/UL (ref 1–4.8)
LYMPHOCYTES NFR BLD: 39.4 % (ref 22–41)
MAGNESIUM SERPL-MCNC: 1.6 MG/DL (ref 1.5–2.5)
MCH RBC QN AUTO: 33.1 PG (ref 27–33)
MCHC RBC AUTO-ENTMCNC: 34.1 G/DL (ref 33.7–35.3)
MCV RBC AUTO: 96.9 FL (ref 81.4–97.8)
MONOCYTES # BLD AUTO: 0.49 K/UL (ref 0–0.85)
MONOCYTES NFR BLD AUTO: 13 % (ref 0–13.4)
NEUTROPHILS # BLD AUTO: 1.67 K/UL (ref 1.82–7.42)
NEUTROPHILS NFR BLD: 44.1 % (ref 44–72)
NRBC # BLD AUTO: 0 K/UL
NRBC BLD-RTO: 0 /100 WBC
PLATELET # BLD AUTO: 198 K/UL (ref 164–446)
PMV BLD AUTO: 10 FL (ref 9–12.9)
POTASSIUM SERPL-SCNC: 3.9 MMOL/L (ref 3.6–5.5)
PROT SERPL-MCNC: 6.6 G/DL (ref 6–8.2)
RBC # BLD AUTO: 3.81 M/UL (ref 4.7–6.1)
SODIUM SERPL-SCNC: 139 MMOL/L (ref 135–145)
T4 FREE SERPL-MCNC: 1.14 NG/DL (ref 0.93–1.7)
TSH SERPL DL<=0.005 MIU/L-ACNC: 2.51 UIU/ML (ref 0.38–5.33)
WBC # BLD AUTO: 3.8 K/UL (ref 4.8–10.8)

## 2022-11-21 PROCEDURE — 84443 ASSAY THYROID STIM HORMONE: CPT

## 2022-11-21 PROCEDURE — 80053 COMPREHEN METABOLIC PANEL: CPT

## 2022-11-21 PROCEDURE — 36415 COLL VENOUS BLD VENIPUNCTURE: CPT

## 2022-11-21 PROCEDURE — 84439 ASSAY OF FREE THYROXINE: CPT

## 2022-11-21 PROCEDURE — 83735 ASSAY OF MAGNESIUM: CPT

## 2022-11-21 PROCEDURE — 85025 COMPLETE CBC W/AUTO DIFF WBC: CPT

## 2022-11-21 NOTE — PROGRESS NOTES
"Pharmacy Chemotherapy Verification  Patient Name: Babatunde Sullivan   Dx: NSCLC (adenocarcinoma)       Protocol: Nivolumab/PEMEtrexed/CISplatin      Nivolumab 360 mg IV over 30 minutes on Day 1, followed by  PEMEtrexed 500 mg/m² IV over 10 minutes on Day 1, followed by   -Changed to CARBOplatin AUC 6 iv over 30 minutes on Day 1 starting Cycle 2  21 day cycle for 3 cycles   NCCN guidelines for Non-Small Cell Lung Cancer. V.3.2022.  Darek PM, et al. N Engl J Med. 2022    Allergies:  Mefoxin     BP (!) 144/82   Pulse 86   Temp 36.1 °C (96.9 °F) (Temporal)   Resp 16   Ht 1.85 m (6' 0.84\")   Wt (!) 126 kg (277 lb 5.4 oz)   SpO2 95%   BMI 36.76 kg/m²  Body surface area is 2.54 meters squared.    Labs 11/21/22:  ANC~ 68567 Plt = 198k   Hgb = 12.6     SCr = 0.82 mg/dL CrCl >125 mL/min   AST/ALT/AP = 18/16/76 TBili = 0.3  TSH = 2.51 Free T4 = 1.14    10/11/22 Cyanocobalamin 1000 mcg given, next due 12/13/22     Drug Order   (Drug name, dose, route, IV Fluid & volume, frequency, number of doses) Cycle: 3  Previous treatment: C2 11/1/22     Medication = Nivolumab  Base Dose= 360 mg   Fixed dose; no calculation required   Final Dose = 360 mg   Route = IV  Fluid & Volume =  mL  Admin Duration = Over 60 minutes          Fixed dose; ok to treat with final dose.    Medication = PEMEtrexed  Base Dose= 500 mg/m2   Calc Dose: Base Dose x 2.54 m2 = 1270 mg   Final Dose = 1270 mg  Route = IV  Fluid & Volume =  mL  Admin Duration = Over 10 minutes           < 10 % difference, ok to treat with final dose     Medication = CARBOplatin  Base Dose= AUC 6  Calc Dose:Base Dose (25 + 125 mL/min) = 900 mg   Final Dose = 900 mg   Route = IV  Fluid & Volume =  mL  Admin Duration = Over 30 minutes          < 10 % difference, ok to treat with final dose       By my signature below, I confirm this process was performed independently with the BSA and all final chemotherapy dosing calculations congruent. I have reviewed the " above chemotherapy order and that my calculation of the final dose and BSA (when applicable) corroborate those calculations of the  pharmacist. Discrepancies of 10 % or greater in the written dose have been addressed and documented within the EPIC Progress notes.    Domenic Alonso, PharmD

## 2022-11-22 ENCOUNTER — HOSPITAL ENCOUNTER (OUTPATIENT)
Dept: HEMATOLOGY ONCOLOGY | Facility: MEDICAL CENTER | Age: 57
End: 2022-11-22
Attending: NURSE PRACTITIONER
Payer: COMMERCIAL

## 2022-11-22 ENCOUNTER — OUTPATIENT INFUSION SERVICES (OUTPATIENT)
Dept: ONCOLOGY | Facility: MEDICAL CENTER | Age: 57
End: 2022-11-22
Attending: STUDENT IN AN ORGANIZED HEALTH CARE EDUCATION/TRAINING PROGRAM
Payer: COMMERCIAL

## 2022-11-22 VITALS
SYSTOLIC BLOOD PRESSURE: 132 MMHG | DIASTOLIC BLOOD PRESSURE: 92 MMHG | WEIGHT: 278.44 LBS | HEIGHT: 72 IN | RESPIRATION RATE: 18 BRPM | HEART RATE: 107 BPM | OXYGEN SATURATION: 94 % | TEMPERATURE: 98.6 F | BODY MASS INDEX: 37.71 KG/M2

## 2022-11-22 VITALS
HEART RATE: 86 BPM | HEIGHT: 73 IN | OXYGEN SATURATION: 95 % | WEIGHT: 277.34 LBS | DIASTOLIC BLOOD PRESSURE: 82 MMHG | RESPIRATION RATE: 16 BRPM | BODY MASS INDEX: 36.76 KG/M2 | TEMPERATURE: 96.9 F | SYSTOLIC BLOOD PRESSURE: 144 MMHG

## 2022-11-22 DIAGNOSIS — H93.13 TINNITUS OF BOTH EARS: ICD-10-CM

## 2022-11-22 DIAGNOSIS — Z79.899 ENCOUNTER FOR LONG-TERM CURRENT USE OF HIGH RISK MEDICATION: ICD-10-CM

## 2022-11-22 DIAGNOSIS — R12 HEARTBURN: ICD-10-CM

## 2022-11-22 DIAGNOSIS — C34.91 PRIMARY ADENOCARCINOMA OF RIGHT LUNG (HCC): ICD-10-CM

## 2022-11-22 DIAGNOSIS — R91.8 LUNG MASS: ICD-10-CM

## 2022-11-22 DIAGNOSIS — R41.89 BRAIN FOG: ICD-10-CM

## 2022-11-22 DIAGNOSIS — K59.03 DRUG-INDUCED CONSTIPATION: ICD-10-CM

## 2022-11-22 PROCEDURE — 700105 HCHG RX REV CODE 258: Performed by: STUDENT IN AN ORGANIZED HEALTH CARE EDUCATION/TRAINING PROGRAM

## 2022-11-22 PROCEDURE — 96375 TX/PRO/DX INJ NEW DRUG ADDON: CPT

## 2022-11-22 PROCEDURE — 96367 TX/PROPH/DG ADDL SEQ IV INF: CPT

## 2022-11-22 PROCEDURE — 99212 OFFICE O/P EST SF 10 MIN: CPT | Performed by: NURSE PRACTITIONER

## 2022-11-22 PROCEDURE — 700111 HCHG RX REV CODE 636 W/ 250 OVERRIDE (IP): Performed by: STUDENT IN AN ORGANIZED HEALTH CARE EDUCATION/TRAINING PROGRAM

## 2022-11-22 PROCEDURE — 96413 CHEMO IV INFUSION 1 HR: CPT

## 2022-11-22 PROCEDURE — 99214 OFFICE O/P EST MOD 30 MIN: CPT | Performed by: NURSE PRACTITIONER

## 2022-11-22 PROCEDURE — 96417 CHEMO IV INFUS EACH ADDL SEQ: CPT

## 2022-11-22 PROCEDURE — 700111 HCHG RX REV CODE 636 W/ 250 OVERRIDE (IP): Performed by: NURSE PRACTITIONER

## 2022-11-22 PROCEDURE — 96411 CHEMO IV PUSH ADDL DRUG: CPT

## 2022-11-22 RX ORDER — PALONOSETRON 0.05 MG/ML
0.25 INJECTION, SOLUTION INTRAVENOUS ONCE
Status: CANCELLED
Start: 2022-11-23

## 2022-11-22 RX ORDER — ACETAMINOPHEN 500 MG
500-1000 TABLET ORAL EVERY 6 HOURS PRN
COMMUNITY
End: 2023-07-12

## 2022-11-22 RX ORDER — PALONOSETRON 0.05 MG/ML
0.25 INJECTION, SOLUTION INTRAVENOUS ONCE
Status: COMPLETED | OUTPATIENT
Start: 2022-11-22 | End: 2022-11-22

## 2022-11-22 RX ADMIN — FOSAPREPITANT 150 MG: 150 INJECTION, POWDER, LYOPHILIZED, FOR SOLUTION INTRAVENOUS at 09:34

## 2022-11-22 RX ADMIN — PALONOSETRON 0.25 MG: 0.05 INJECTION, SOLUTION INTRAVENOUS at 09:18

## 2022-11-22 RX ADMIN — DEXAMETHASONE SODIUM PHOSPHATE 12 MG: 4 INJECTION, SOLUTION INTRA-ARTICULAR; INTRALESIONAL; INTRAMUSCULAR; INTRAVENOUS; SOFT TISSUE at 09:22

## 2022-11-22 RX ADMIN — CARBOPLATIN 900 MG: 10 INJECTION, SOLUTION INTRAVENOUS at 11:44

## 2022-11-22 RX ADMIN — PEMETREXED DISODIUM 1270 MG: 500 INJECTION, POWDER, LYOPHILIZED, FOR SOLUTION INTRAVENOUS at 11:25

## 2022-11-22 RX ADMIN — SODIUM CHLORIDE 360 MG: 9 INJECTION, SOLUTION INTRAVENOUS at 10:18

## 2022-11-22 ASSESSMENT — ENCOUNTER SYMPTOMS
VOMITING: 0
HEADACHES: 0
SHORTNESS OF BREATH: 1
PALPITATIONS: 0
CONSTIPATION: 0
WEIGHT LOSS: 0
DIARRHEA: 0
NAUSEA: 0
COUGH: 1
CHILLS: 0
FEVER: 0
DIZZINESS: 0

## 2022-11-22 ASSESSMENT — FIBROSIS 4 INDEX
FIB4 SCORE: 1.227272727272727273
FIB4 SCORE: 1.227272727272727273

## 2022-11-22 ASSESSMENT — PAIN SCALES - GENERAL: PAINLEVEL: NO PAIN

## 2022-11-22 NOTE — ADDENDUM NOTE
Encounter addended by: Zahra Mccoy, Med Ass't on: 11/22/2022 9:11 AM   Actions taken: Charge Capture section accepted

## 2022-11-22 NOTE — PROGRESS NOTES
Pt arrives to [facility] for cycle 3 of Carboplatin/Alimta/Opdivo for lung cancer.  Pt was seen by ROSANA Alford prior to this appt.  Per Katrina, this treatment will be the last cycle prior to surgery.  Pt will have consult with surgeon next month and have tele-visit appt with Dr. Howe after his surgery.  Discussed plan of care with pt.  Pt denies s/sx of infection, nausea or pain.  Pt reports fatigue and brain fog.  PIV established to L-AC with positive blood return observed.  Labs drawn on 11/21/22 were reviewed and results meet parameters for treatment.  Pre-medications given.  Opdivo infused over 1 hour.  Alimta infused.  Carboplatin infused without adverse reaction.  PIV flushed with NS and PIV was removed intact.  Site wrapped with pressure dressing.   Pt dc home with his spouse.

## 2022-11-22 NOTE — PROGRESS NOTES
Chemotherapy Verification - PRIMARY RN    C3 D1    Height = 185 cm  Weight = 126 kg  BSA = 2.54 m^2       Medication: Carboplatin  Dose: Auc 6  Calculated Dose: 900 mg                             (In mg/m2, AUC, mg/kg)     Medication: Opdivo  Dose: set dose 360 mg  Calculated Dose: set dose 360 mg                             (In mg/m2, AUC, mg/kg)    Medication: Pemetrexed (Alimta)  Dose: 500 mg/m^2  Calculated Dose: 1,270 mg                             (In mg/m2, AUC, mg/kg)    Carboplatin calculation (if applicable):  (6*(125+25)) = 900  (CrCl 181 ml/min, used max CrCl 125 ml/min per pharmacy protocol)      I confirm this process was performed independently with the BSA and all final chemotherapy dosing calculations congruent.  Any discrepancies of 10% or greater have been addressed with the chemotherapy pharmacist. The resolution of the discrepancy has been documented in the EPIC progress notes.

## 2022-11-22 NOTE — PROGRESS NOTES
Chemotherapy Verification - SECONDARY RN       Height = 1.85m  Weight = 126kg  BSA = 2.54m2       Medication: carboplatin  Dose: AUC 6  Calculated Dose: 900mg                             (In mg/m2, AUC, mg/kg)     Medication: nivolumuab  Dose: flat dose  Calculated Dose: 360mg                             (In mg/m2, AUC, mg/kg)    Medication: pemetrexed  Dose: 500mg/m2  Calculated Dose: 1270mg                             (In mg/m2, AUC, mg/kg)        Carboplatin calculation (if applicable):  (6*(125+25)) = 900    I confirm that this process was performed independently.

## 2022-11-22 NOTE — PROGRESS NOTES
Subjective     Babatunde Sullivan is a 56 y.o. male who presents with Cancer (Prechemo )          HPI    Patient seen today in follow-up for non-small cell lung cancer.  Patient presents accompanied by his wife.     Oncology history of presenting illness:  Patient initially presented to Dr. Howe, medical oncologist in September 2022.  He had reported approximately a year and a half of coughing and shortness of breath.  In March 2020 he had noticed hemoptysis from coughing vigorously.  Since then he has noticed the continued cough.  In January 2021 he did have COVID and had an exacerbation of shortness of breath and cough at that time.  On 7/9/2022 patient did undergo a CT chest with contrast for his chronic cough of approximately 15 months ordered by PCP.  CT showed a lung mass in the right middle lobe.  He did have a biopsy of the right middle lobe which was consistent with moderately differentiated non-small cell carcinoma.  He had a PET scan on 8/18/2022 which showed a right hilar mass with an SUV of 8.6.  There was no mediastinal left hilar adenopathy.  They did note postsurgical changes within the abdomen and pelvis, and no evidence of metastatic disease in the neck, abdomen or pelvis.  MRI brain was negative for metastatic disease.     Patient with a known history of testicular cancer at the age of 18 in 1984 with apparent metastasis in the abdomen right lung at that time.  He did have his testicle removed as well as his left kidney, and he did undergo a right thoracotomy for upper lobe lesion with wedge resection in December 1984.  He was noted to be DYLAN at that time.     Patient initially seen by Dr. Ganser, thoracic surgeon who recommended neoadjuvant chemotherapy prior to surgical interventions.  Per Dr. Ganser he believes after reviewing the images that his tumor is larger than 3.5 cm making him a stage IIIa.     Treatment history:  10/11/22: C1D1 Cisplatin, Pemetrexed and Nivolumab  11/01/22: C2D1  Carbo, Pemetrexed and Nivolumab (Cis changed to Carbo d/t tinnitus)  11/22/22: C3D1 Carbo, Pemetrexed and Nivolumab     Interval history:  Patient seen today in follow-up prior to cycle 3 of carboplatin, pemetrexed and nivolumab.  Clinically patient is stable however he did state that he tolerated cycle 2 little bit worse than he did with his first cycle of chemotherapy.  He did note that the tinnitus was much better.  He has a low reading in his ears which was present prior to starting chemotherapy.  However he did state that the high ringing that was quite bothersome with his first cycle with cisplatin has definitely improved.  However, patient complained of severe heartburn related to the dexamethasone on days 2 and 3.  He stated he took Tums multiple times throughout the day with positive results.  He is apprehensive to take dexamethasone again.  However he did take them on day 0 as planned.  Patient also notes constipation for approximately 3 days.  He took stool softener as well as high-fiber diet and eventually had a bowel movement.  It is improved at this time.  Patient also complained of severe brain fog/balance issues.  Wife stated that it was quite significant and just in the last few days he has had improvement.  Patient is eating well.  He does have some fatigue but he is making a point to get outside and walk daily.    I personally reviewed patient CBC, CMP, TSH and free T4 labs with him in detail today.      Allergies   Allergen Reactions    Mefoxin Swelling     .     Current Outpatient Medications on File Prior to Encounter   Medication Sig Dispense Refill    dexamethasone (DECADRON) 4 MG Tab       prochlorperazine (COMPAZINE) 10 MG Tab Take 1 Tablet by mouth every 6 hours as needed (for nausea, vomiting). 30 Tablet 6    folic acid (FOLVITE) 1 MG Tab Take 1 Tablet by mouth every day for 105 days. Start 5-7 days prior to first cycle of pemetrexed 30 Tablet 3    KLOR-CON M20 20 MEQ Tab CR Take 80 mEq  by mouth every day.      Multiple Vitamin (MULTIVITAMINS PO) Take  by mouth every day.      Psyllium 0.52 GM Cap Take  by mouth 1 time a day as needed.      hydroCHLOROthiazide (HYDRODIURIL) 50 MG Tab TAKE 1 & 1/2 (ONE & ONE-HALF) TABLETS BY MOUTH ONCE DAILY FOR EDEMA       No current facility-administered medications on file prior to encounter.         Review of Systems   Constitutional:  Negative for chills, fever, malaise/fatigue and weight loss.   HENT:  Positive for tinnitus.    Respiratory:  Positive for cough (Mild dry coughing fits at times but not worsening) and shortness of breath (only with exertion).    Cardiovascular:  Negative for chest pain and palpitations.   Gastrointestinal:  Negative for constipation, diarrhea, nausea and vomiting.   Genitourinary:  Negative for dysuria.   Neurological:  Negative for dizziness and headaches.            Objective     BP (!) 132/92   Pulse (!) 107   Temp 37 °C (98.6 °F) (Temporal)   Resp 18   Ht 1.829 m (6')   Wt (!) 126 kg (278 lb 7.1 oz)   SpO2 94%   BMI 37.76 kg/m²      Physical Exam  Vitals reviewed.   Constitutional:       General: He is not in acute distress.     Appearance: Normal appearance. He is not diaphoretic.   HENT:      Head: Normocephalic and atraumatic.   Cardiovascular:      Rate and Rhythm: Tachycardia present.      Heart sounds: Normal heart sounds. No murmur heard.    No friction rub. No gallop.   Pulmonary:      Effort: Pulmonary effort is normal. No respiratory distress.      Breath sounds: Normal breath sounds. No wheezing.   Abdominal:      General: Bowel sounds are normal. There is no distension.      Palpations: Abdomen is soft.      Tenderness: There is no abdominal tenderness.   Musculoskeletal:         General: No swelling or tenderness. Normal range of motion.   Skin:     General: Skin is warm and dry.   Neurological:      Mental Status: He is alert and oriented to person, place, and time.   Psychiatric:         Mood and Affect:  Mood normal.         Behavior: Behavior normal.           Latest Reference Range & Units 11/21/22 08:11   WBC 4.8 - 10.8 K/uL 3.8 (L)   RBC 4.70 - 6.10 M/uL 3.81 (L)   Hemoglobin 14.0 - 18.0 g/dL 12.6 (L)   Hematocrit 42.0 - 52.0 % 36.9 (L)   MCV 81.4 - 97.8 fL 96.9   MCH 27.0 - 33.0 pg 33.1 (H)   MCHC 33.7 - 35.3 g/dL 34.1   RDW 35.9 - 50.0 fL 45.2   Platelet Count 164 - 446 K/uL 198   MPV 9.0 - 12.9 fL 10.0   Neutrophils-Polys 44.00 - 72.00 % 44.10   Neutrophils (Absolute) 1.82 - 7.42 K/uL 1.67 (L)   Lymphocytes 22.00 - 41.00 % 39.40   Lymphs (Absolute) 1.00 - 4.80 K/uL 1.49   Monocytes 0.00 - 13.40 % 13.00   Monos (Absolute) 0.00 - 0.85 K/uL 0.49   Eosinophils 0.00 - 6.90 % 1.90   Eos (Absolute) 0.00 - 0.51 K/uL 0.07   Basophils 0.00 - 1.80 % 0.50   Baso (Absolute) 0.00 - 0.12 K/uL 0.02   Immature Granulocytes 0.00 - 0.90 % 1.10 (H)   Immature Granulocytes (abs) 0.00 - 0.11 K/uL 0.04   Nucleated RBC /100 WBC 0.00   NRBC (Absolute) K/uL 0.00   Sodium 135 - 145 mmol/L 139   Potassium 3.6 - 5.5 mmol/L 3.9   Chloride 96 - 112 mmol/L 100   Co2 20 - 33 mmol/L 28   Anion Gap 7.0 - 16.0  11.0   Glucose 65 - 99 mg/dL 105 (H)   Bun 8 - 22 mg/dL 17   Creatinine 0.50 - 1.40 mg/dL 0.82   GFR (CKD-EPI) >60 mL/min/1.73 m 2 104   Calcium 8.5 - 10.5 mg/dL 9.3   AST(SGOT) 12 - 45 U/L 18   ALT(SGPT) 2 - 50 U/L 16   Alkaline Phosphatase 30 - 99 U/L 76   Total Bilirubin 0.1 - 1.5 mg/dL 0.3   Albumin 3.2 - 4.9 g/dL 4.0   Total Protein 6.0 - 8.2 g/dL 6.6   Globulin 1.9 - 3.5 g/dL 2.6   A-G Ratio g/dL 1.5   Magnesium 1.5 - 2.5 mg/dL 1.6   TSH 0.380 - 5.330 uIU/mL 2.510   Free T-4 0.93 - 1.70 ng/dL 1.14                Assessment & Plan       1. Primary adenocarcinoma of right lung (HCC)        2. Drug-induced constipation        3. Brain fog        4. Heartburn        5. Tinnitus of both ears        6. Encounter for long-term current use of high risk medication                1. Patient with non-small cell adenocarcinoma of the right  "lung.  He currently is scheduled to proceed with cycle 3 of carboplatin, pemetrexed with nivolumab today.  Plan to proceed with 3 cycles followed by surgery.  Clinically patient is stable and I did review the CBC, CMP, TSH and free T4 and labs are appropriate to proceed with treatment today as planned.    2.  Regarding constipation, I believe that this may be related to premedication of Zofran.  I have changed Zofran to Aloxi.  I also discussed with patient taking MiraLAX daily to help with constipation following treatment.  Patient does not take Zofran at home but rather Compazine as it works well for his nausea if needed.    3.  Patient complains of heartburn post treatment on days 2 and 3 likely related to dexamethasone.  We discussed the importance of medication with dexamethasone posttreatment.  I recommended he take omeprazole daily for the next week and he can take Tums as needed to help with supportive needs to reduce heartburn.    4.  Patient with tinnitus following his first cycle of cisplatin.  With the change to carboplatin he has had significant improvement.  His tinnitus is back to baseline.  We will continue to monitor.    5.  Patient complained of brain fog and balance issues postchemotherapy.  It has improved over the last 3 weeks.  Wife stated he is able to carry on a full conversation at this time.  We discussed with him that this is likely related to chemotherapy known as \"chemo brain.\"  We will continue treatment as planned and patient to contact office if symptoms worsen.    6.Patient is already scheduled to be seen by surgeon on 12/13/2022 to discuss and schedule surgery. No need for chemotherapy and prechemotherapy appointment scheduled on 12/13/2022 which will be canceled.  However will get patient in to be scheduled for a virtual visit with Dr. Howe after he meets with surgeon the week of 12/19/2022.      Please note that this dictation was created using voice recognition software. I have " made every reasonable attempt to correct obvious errors, but I expect that there are errors of grammar and possibly content that I did not discover before finalizing the note.

## 2022-12-02 DIAGNOSIS — C34.91 PRIMARY ADENOCARCINOMA OF RIGHT LUNG (HCC): Primary | ICD-10-CM

## 2022-12-02 RX ORDER — FOLIC ACID 1 MG/1
1 TABLET ORAL DAILY
Qty: 90 TABLET | Refills: 1 | Status: SHIPPED | OUTPATIENT
Start: 2022-12-02 | End: 2023-05-09

## 2022-12-08 ENCOUNTER — HOSPITAL ENCOUNTER (OUTPATIENT)
Dept: RADIOLOGY | Facility: MEDICAL CENTER | Age: 57
End: 2022-12-08
Attending: INTERNAL MEDICINE
Payer: COMMERCIAL

## 2022-12-08 DIAGNOSIS — I82.90 THROMBOSIS: ICD-10-CM

## 2022-12-08 DIAGNOSIS — R91.8 ENDOBRONCHIAL MASS: ICD-10-CM

## 2022-12-08 PROCEDURE — 71275 CT ANGIOGRAPHY CHEST: CPT

## 2022-12-08 PROCEDURE — 700117 HCHG RX CONTRAST REV CODE 255: Performed by: INTERNAL MEDICINE

## 2022-12-08 RX ADMIN — IOHEXOL 100 ML: 350 INJECTION, SOLUTION INTRAVENOUS at 11:07

## 2022-12-20 ENCOUNTER — HOSPITAL ENCOUNTER (OUTPATIENT)
Dept: HEMATOLOGY ONCOLOGY | Facility: MEDICAL CENTER | Age: 57
End: 2022-12-20
Attending: STUDENT IN AN ORGANIZED HEALTH CARE EDUCATION/TRAINING PROGRAM
Payer: COMMERCIAL

## 2022-12-20 DIAGNOSIS — C34.90 ADENOCARCINOMA OF LUNG, UNSPECIFIED LATERALITY (HCC): ICD-10-CM

## 2022-12-20 PROCEDURE — 99214 OFFICE O/P EST MOD 30 MIN: CPT | Mod: 95 | Performed by: STUDENT IN AN ORGANIZED HEALTH CARE EDUCATION/TRAINING PROGRAM

## 2022-12-20 ASSESSMENT — ENCOUNTER SYMPTOMS
VOMITING: 0
CHILLS: 0
HEARTBURN: 0
NAUSEA: 0
HEADACHES: 0
PALPITATIONS: 0
SHORTNESS OF BREATH: 1
SPUTUM PRODUCTION: 0
WHEEZING: 0
TREMORS: 0
BRUISES/BLEEDS EASILY: 0
FEVER: 0
DEPRESSION: 0
ABDOMINAL PAIN: 0
ORTHOPNEA: 0
MEMORY LOSS: 0
NECK PAIN: 0
ROS GI COMMENTS: HEMORRHOID
SORE THROAT: 0
SENSORY CHANGE: 0
COUGH: 1
TINGLING: 0
DIZZINESS: 0
FOCAL WEAKNESS: 0
WEIGHT LOSS: 0
BLURRED VISION: 0

## 2022-12-20 NOTE — ADDENDUM NOTE
Encounter addended by: Clementine Howe M.D. on: 12/20/2022 3:23 PM   Actions taken: Clinical Note Signed

## 2022-12-20 NOTE — PROGRESS NOTES
Consult Note: Hematology/Oncology     Primary Care:  Yovany Packer M.D.    CC: Post care for newly diagnosed adenocarcinoma    Treatment history:  10/11/22: C1D1 Cisplatin, Pemetrexed and Nivolumab  11/01/22: C2D1 Carbo, Pemetrexed and Nivolumab (Cis changed to Carbo d/t tinnitus)  11/22/22: C3D1 Carbo, Pemetrexed and Nivolumab    Prior Treatment: Surgery chemotherapy for metastatic nonseminomatous testicular cancer    This evaluation was conducted via Zoom using secure and encrypted videoconferencing technology. The patient was in their home in the [facility].    The patient's identity was confirmed and verbal consent was obtained for this virtual visit.     Subjective:   History of Presenting Illness:  Babatunde Sullivan is a 56 y.o. male with a past medical history of metastatic testicular cancer treated with surgery and chemotherapy in 1984 presents today to establish care for a new diagnosis of right middle lobe adenocarcinoma.    Patient reports that he has had a year and a half of coughing and shortness of breath.  In March 2020 he had hemoptysis resumed from coughing vigorously.  Since that time he continued to cough.  In January 2021 he had COVID and had an exacerbation of his shortness of breath and cough.  He had a CT chest which noted a lung mass in his right middle lobe he had a bronchoscopy with biopsy which confirmed adenocarcinoma.    Regarding patient's testicular cancer, he was diagnosed at age 18 in 1984.  He had metastases to his left kidney and lung.  He had his testicle removed as well as his left kidney and a thoracotomy with a wedge resection.  He was noted to be DYLAN at that time.    Note patient father had melanoma but passed from sepsis.  There is no other family history of cancer.  The patient is a shelter .  He denies smoking, he does not drink alcohol, and he denies IV drug use.  He lives with his wife and dog.  He has an adopted 23-year-old son who no longer lives  at home.    Patient lives in [place] is [place] minutes east of [place].    He is now s/p 3 rounds of chemotherapy.     Interval History  Patient reports that he is doing well aside from some fatigue.  He is scheduled for surgery on February 14.  He notes that the ringing in his ears has lessened but it is still there.  He notes some eye watering which results in a thicker secretion than usual.  He denies being around any children or any sick contacts.  He had a large external hemorrhoid after his last chemotherapy for which he used hemorrhoid cream and Tucks pads.  He notes some improvement in his hemorrhoid.  He did have an episode where he lost consciousness while on the toilet.  He denies any bright red blood in the toilet bowl at that time and believes this is most likely due to dehydration during the episode.        Past Medical History:   Diagnosis Date    Anesthesia     PONV    Arthritis     osteo-lower back    Back pain     Back pain     Bronchitis 03/2021    Bronchitis approx once a year    Cancer (HCC) 1984    testicular    Carcinoma in situ of respiratory system     Chickenpox     Cough     Heart burn     Influenza     Obesity     PONV (postoperative nausea and vomiting)     Ringing in ears     Tonsillitis     Wears glasses     Wheezing         Past Surgical History:   Procedure Laterality Date    IA BRONCHOSCOPY,DIAGNOSTIC N/A 9/1/2022    Procedure: FIBER OPTIC BRONCHOSCOPY WITH  BRUSH, BIOPSY, FINE NEEDLE ASPIRATION AND ENDOBRONCHIAL ULTRASOUND;  Surgeon: Adam Rahman M.D.;  Location: SURGERY [facility];  Service: Pulmonary    ENDOBRONCHIAL US ADD-ON  9/1/2022    Procedure: ENDOBRONCHIAL ULTRASOUND (EBUS);  Surgeon: Adam Rahman M.D.;  Location: SURGERY [facility];  Service: Pulmonary    ARTHROSCOPY, KNEE Right 2006    acl    JOCELINE BY LAPAROSCOPY  2002    scar tissue removed 2 years later (2004)    OTHER Left 1984    Embrionic carcinoma, testicle removed    WEDGE RESECTION LUNG Right  1984    toracotomy with wedge resection    MASS EXCISION GENERAL Left 1984    Mass removed left kidney    TONSILLECTOMY  1972       Social History     Tobacco Use    Smoking status: Never    Smokeless tobacco: Never    Tobacco comments:     Both of my parents smoked cigarettes   Vaping Use    Vaping Use: Never used   Substance Use Topics    Alcohol use: Not Currently     Alcohol/week: 10.8 oz     Types: 12 Cans of beer, 6 Shots of liquor per week     Comment: Have been on the [employer] for 30 years    Drug use: Not Currently     Types: Marijuana, Oral     Comment: No drugs done in over 30 years        Family History   Problem Relation Age of Onset    Cancer Father             Hypertension Father     Stroke Father        Allergies   Allergen Reactions    Mefoxin Swelling     .       Current Outpatient Medications   Medication Sig Dispense Refill    folic acid (FOLVITE) 1 MG Tab Take 1 Tablet by mouth every day for 105 days. Start 5-7 days prior to first cycle of pemetrexed 90 Tablet 1    acetaminophen (TYLENOL) 500 MG Tab Take 500-1,000 mg by mouth every 6 hours as needed.      Zinc Citrate (ZINC EXTRA STRENGTH PO) Take  by mouth every day.      prochlorperazine (COMPAZINE) 10 MG Tab Take 1 Tablet by mouth every 6 hours as needed (for nausea, vomiting). 30 Tablet 6    KLOR-CON M20 20 MEQ Tab CR Take 80 mEq by mouth every day.      Multiple Vitamin (MULTIVITAMINS PO) Take  by mouth every day.      Psyllium 0.52 GM Cap Take  by mouth 1 time a day as needed.      hydroCHLOROthiazide (HYDRODIURIL) 50 MG Tab TAKE 1 & 1/2 (ONE & ONE-HALF) TABLETS BY MOUTH ONCE DAILY FOR EDEMA      dexamethasone (DECADRON) 4 MG Tab        No current facility-administered medications for this encounter.       Review of Systems   Constitutional:  Negative for chills, fever, malaise/fatigue and weight loss.   HENT:  Positive for tinnitus (has improved). Negative for congestion, ear pain, nosebleeds and sore throat.    Eyes:  Negative for  blurred vision.   Respiratory:  Positive for cough (comes and goes) and shortness of breath (improved). Negative for sputum production and wheezing.    Cardiovascular:  Negative for chest pain, palpitations, orthopnea and leg swelling.   Gastrointestinal:  Negative for abdominal pain, heartburn, nausea and vomiting.        Hemorrhoid   Genitourinary:  Negative for dysuria, frequency and urgency.   Musculoskeletal:  Negative for neck pain.   Neurological:  Negative for dizziness, tingling, tremors, sensory change, focal weakness and headaches.   Endo/Heme/Allergies:  Does not bruise/bleed easily.   Psychiatric/Behavioral:  Negative for depression, memory loss and suicidal ideas.    All other systems reviewed and are negative.    Problem list, medications, and allergies reviewed by myself today in Epic.     Objective:     There were no vitals filed for this visit.      DESC; KARNOFSKY SCALE WITH ECOG EQUIVALENT: 100, Fully active, able to carry on all pre-disease performed without restriction (ECOG equivalent 0)    DISTRESS LEVEL: no apparent distress  NOT done due to telemedicine visit.  Previous visit noted below  Physical Exam  Constitutional:       General: He is not in acute distress.     Appearance: Normal appearance.   HENT:      Head: Normocephalic and atraumatic.      Nose: Nose normal. No congestion.      Mouth/Throat:      Mouth: Mucous membranes are moist.      Pharynx: Oropharynx is clear.   Eyes:      General: No scleral icterus.     Conjunctiva/sclera: Conjunctivae normal.      Pupils: Pupils are equal, round, and reactive to light.   Cardiovascular:      Rate and Rhythm: Normal rate and regular rhythm.      Pulses: Normal pulses.      Heart sounds: No murmur heard.    No friction rub.   Pulmonary:      Effort: Pulmonary effort is normal. No respiratory distress.      Breath sounds: Normal breath sounds. No stridor. No wheezing or rales.   Chest:      Chest wall: No tenderness.   Abdominal:       General: Abdomen is flat. Bowel sounds are normal. There is no distension.      Palpations: Abdomen is soft. There is no mass.      Tenderness: There is no abdominal tenderness. There is no guarding or rebound.   Musculoskeletal:         General: No swelling, tenderness or deformity. Normal range of motion.      Cervical back: Normal range of motion and neck supple. No rigidity or tenderness.      Right lower leg: No edema.      Left lower leg: No edema.   Skin:     General: Skin is warm.      Coloration: Skin is not jaundiced or pale.      Findings: No bruising or rash.   Neurological:      General: No focal deficit present.      Mental Status: He is alert and oriented to person, place, and time. Mental status is at baseline.      Motor: No weakness.   Psychiatric:         Mood and Affect: Mood normal.         Behavior: Behavior normal.         Thought Content: Thought content normal.         Judgment: Judgment normal.       Labs:   Most recent labs reviewed.  Please see the lab tab of chart review    Imaging:   Most recent images below have been independently reviewed by me.      Chest  Mediastinum/Amy: There is a right hilar mass/lymphadenopathy with some mass effect on the right anterolateral lower lobe bronchus and encasement of the descending pulmonary artery. Due to postobstructive airspace disease in the right middle   lobe/anterior segment right lower lobe is difficult to obtain an accurate measurement of the suspected masslike area. The AP diameter is estimated at 3.4 cm.    Brain: PENDING    PET scan:  1.  Right hilar mass is again identified which is difficult to distinguish from the consolidated and atelectatic lung distal to the mass extending out to the pleural surface. There are some focal areas of elevated activity within the mass as described   above. Maximum activity level is 8.6 SUV. Differential diagnosis does include lung carcinoma. Metastatic carcinoma is also possible. Inflammatory or  infectious lesion is also in the differential diagnosis. Noted calcifications are also present.     2.  The consolidated atelectatic lung surrounding the major fissure in the right middle lobe and right lower lobe, which possibly includes a small amount of fluid in the major fissure located distal to the mass appears decreased slightly in thickness and   volume compared to the prior chest CT.     3.  No mediastinal or left hilar adenopathy. No elevated activity within these nodes.     4.  Postsurgical changes noted in the abdomen and pelvis.     5.  No PET/CT evidence of metastatic disease in the neck abdomen or pelvis.       Pathology:  FINAL DIAGNOSIS:     A. Right middle lobe, forceps biopsy:          Positive for moderately differentiated non-small cell           adenocarcinoma.          Please see comment.   B. Right middle lobe, brushing brush head with touch prep slides:          Atypical cells present suspicious for malignancy.   C. Right lower lobe posterior segment brushing brush head with touch   prep slides:          Bronchial epithelial cells some with reactive changes, scattered           macrophages, lymphocytes, degenerated cells, mucus and blood.          Few rare atypical cells present which cannot be further           characterized.   D. 11R, fine needle aspiration slides:          Positive for malignant cells.   E. 11R, fine needle aspiration core:          Positive for moderately differentiated non-small cell           adenocarcinoma morphologically resembles the tumor in specimen           A.          No lymph node tissue identified in sections examined    Brain MRI : negative for malignancy     Assessment/Plan:      Cancer Staging   Adenocarcinoma, lung (HCC)  Staging form: Lung, AJCC 8th Edition  - Clinical stage from 9/15/2022: Stage IIB (cT2a, cN1, cM0) - Signed by Clementine Howe M.D. on 9/15/2022       Diagnosis and Associated Orders:     There are no diagnoses linked to this  encounter.    Mr. Sullivan is a 56-year-old male with a new diagnosis of adenocarcinoma.  Dr. Ganser and I spoke after patient was seen in his clinic.  Dr. Ganser reports that after reviewing the images he believes that his tumor is larger than 3.5 cm, and would stage the patient as a Stage 3A. The patient would require at least some middle and lower lobectomy and possibly part of the upper lobe as well.  As such Dr. Ganser asked that I would consider giving the patient neoadjuvant chemotherapy and immunotherapy.  I believe this is the best option for the patient and agree with Dr. Ganser's assessment. Given patients ringing in his ears, after first cycle of cisplatin switched cisplatin to carboplatin.  He is now status post 3 cycles of nivolumab carboplatin and pemetrexed.    Had a long discussion with the patient and his wife regarding the surveillance schedule going forward.  We discussed that we will proceed with a history of physical and chest CT with contrast every 3 to 6 months for 3 years and then history and physical and chest CT every 6 months for 2 years.  At that time we will then proceed with a low-dose noncontrast enhanced CT chest annually.  The patient and his wife agreed to this plan.    Also discussed his need for a note for work to explain why he is out of work.  I told him that we will be happy to provide that note.    Plan  -Receive a surgery as scheduled on February 14, 2023  -The patient to weeks after surgery to review the pathology and determine if there is a change in his plan  -Otherwise we will plan for the patient to see me in person in 3 months after surgery around May 15, 2023  -Time we will get a CT chest and begin surveillance  -We will provide patient with note necessary for him to be out of work until his recovery from surgery as well as his wife given that she is his caregiver    Any questions and concerns raised by the patient were addressed and answered. Patient denies any further  questions.  Patient encouraged to call the office with any concerns or issues.       Clementine Howe M.D.  Hematology/Oncology    34 minutes was spent on this visit

## 2023-01-23 ENCOUNTER — OFFICE VISIT (OUTPATIENT)
Dept: URGENT CARE | Facility: PHYSICIAN GROUP | Age: 58
End: 2023-01-23
Payer: COMMERCIAL

## 2023-01-23 VITALS
BODY MASS INDEX: 38.17 KG/M2 | OXYGEN SATURATION: 95 % | HEART RATE: 100 BPM | DIASTOLIC BLOOD PRESSURE: 82 MMHG | TEMPERATURE: 97.2 F | SYSTOLIC BLOOD PRESSURE: 142 MMHG | RESPIRATION RATE: 16 BRPM | WEIGHT: 288 LBS | HEIGHT: 73 IN

## 2023-01-23 DIAGNOSIS — C34.90 ADENOCARCINOMA OF LUNG, UNSPECIFIED LATERALITY (HCC): ICD-10-CM

## 2023-01-23 DIAGNOSIS — R05.9 COUGH, UNSPECIFIED TYPE: ICD-10-CM

## 2023-01-23 PROCEDURE — 99203 OFFICE O/P NEW LOW 30 MIN: CPT | Performed by: STUDENT IN AN ORGANIZED HEALTH CARE EDUCATION/TRAINING PROGRAM

## 2023-01-23 RX ORDER — BENZONATATE 100 MG/1
100 CAPSULE ORAL 3 TIMES DAILY PRN
Qty: 60 CAPSULE | Refills: 0 | Status: SHIPPED | OUTPATIENT
Start: 2023-01-23 | End: 2023-07-12

## 2023-01-23 ASSESSMENT — FIBROSIS 4 INDEX: FIB4 SCORE: 1.25

## 2023-01-23 ASSESSMENT — ENCOUNTER SYMPTOMS: COUGH: 1

## 2023-01-24 ENCOUNTER — PRE-ADMISSION TESTING (OUTPATIENT)
Dept: ADMISSIONS | Facility: MEDICAL CENTER | Age: 58
DRG: 164 | End: 2023-01-24
Attending: SURGERY
Payer: COMMERCIAL

## 2023-01-24 NOTE — PROGRESS NOTES
"Subjective     Babatunde Geovanny Sullivan is a 57 y.o. male who presents with Cough (Pt states heavier cough than normal, pt states he does have lung cancer )            Babatunde is a 57 y.o. male who presents to urgent care for cough.  Patient states he has lung cancer and cough seems to have worsened over the last couple days and he is concerned for bronchitis/pneumonia.  Patient denies fever/chills or fatigue.  He denies symptoms of shortness of breath (over baseline) or difficulty breathing/wheezing. No nasal congestion or sore throat.  No sick contacts.    Cough  This is a chronic problem. The current episode started yesterday. The problem has been gradually worsening. The cough is Non-productive. Pertinent negatives include no chest pain, chills, ear congestion, ear pain, fever, headaches, hemoptysis, myalgias, nasal congestion, postnasal drip, rash, sore throat, shortness of breath or wheezing. He has tried nothing for the symptoms.     Review of Systems   Constitutional:  Negative for chills, fever and malaise/fatigue.   HENT:  Negative for congestion, ear pain, postnasal drip and sore throat.    Respiratory:  Positive for cough. Negative for hemoptysis, shortness of breath and wheezing.    Cardiovascular:  Negative for chest pain and palpitations.   Gastrointestinal:  Negative for abdominal pain, constipation, diarrhea, nausea and vomiting.   Musculoskeletal:  Negative for myalgias.   Skin:  Negative for rash.   Neurological:  Negative for headaches.            Objective     BP (!) 142/82   Pulse 100   Temp 36.2 °C (97.2 °F) (Temporal)   Resp 16   Ht 1.85 m (6' 0.84\")   Wt (!) 131 kg (288 lb)   SpO2 95%   BMI 38.16 kg/m²      Physical Exam  Vitals reviewed.   Constitutional:       General: He is not in acute distress.     Appearance: Normal appearance. He is not ill-appearing or toxic-appearing.   HENT:      Head: Normocephalic and atraumatic.      Nose: Nose normal.      Mouth/Throat:      Mouth: Mucous " membranes are moist.      Pharynx: Oropharynx is clear.   Eyes:      Extraocular Movements: Extraocular movements intact.      Conjunctiva/sclera: Conjunctivae normal.      Pupils: Pupils are equal, round, and reactive to light.   Cardiovascular:      Rate and Rhythm: Normal rate and regular rhythm.   Pulmonary:      Effort: Pulmonary effort is normal. No respiratory distress.      Breath sounds: Normal breath sounds. No stridor. No wheezing, rhonchi or rales.   Musculoskeletal:         General: Normal range of motion.      Cervical back: Normal range of motion.   Skin:     General: Skin is warm and dry.   Neurological:      General: No focal deficit present.      Mental Status: He is alert. Mental status is at baseline.                           Assessment & Plan        1. Cough, unspecified type  - benzonatate (TESSALON) 100 MG Cap; Take 1 Capsule by mouth 3 times a day as needed for Cough.  Dispense: 60 Capsule; Refill: 0    2. Adenocarcinoma of lung, unspecified laterality (HCC)  - Followed by oncology. Surgery scheduled for February 2023. Advised patient to notify oncologist of symptoms and schedule appropriate follow up.  - I personally reviewed prior external notes and test results pertinent to today's visit.     Pulmonary exam revealed normal effort and breath sounds bilaterally.  No respiratory distress.  No wheezes, or rales, stridor.  SPO2 95% on room air.  Patient is well-appearing and does not appear to be in acute distress.  Vital signs are stable.    Supportive care measures and indications for immediate follow-up discussed with patient. Instructed to return to urgent care or nearest emergency department if symptoms fail to improve, for any change in condition, further concerns, or new concerning symptoms.    Patient states understanding and agrees with the plan of care and discharge instructions.

## 2023-01-27 ASSESSMENT — ENCOUNTER SYMPTOMS
SHORTNESS OF BREATH: 0
VOMITING: 0
DIARRHEA: 0
MYALGIAS: 0
FEVER: 0
NAUSEA: 0
CHILLS: 0
WHEEZING: 0
SORE THROAT: 0
HEMOPTYSIS: 0
HEADACHES: 0
PALPITATIONS: 0
ABDOMINAL PAIN: 0
CONSTIPATION: 0

## 2023-01-31 ENCOUNTER — PRE-ADMISSION TESTING (OUTPATIENT)
Dept: ADMISSIONS | Facility: MEDICAL CENTER | Age: 58
DRG: 164 | End: 2023-01-31
Attending: SURGERY
Payer: COMMERCIAL

## 2023-01-31 DIAGNOSIS — Z01.810 PRE-OPERATIVE CARDIOVASCULAR EXAMINATION: ICD-10-CM

## 2023-01-31 DIAGNOSIS — Z01.812 PRE-OPERATIVE LABORATORY EXAMINATION: ICD-10-CM

## 2023-01-31 LAB
ABO GROUP BLD: NORMAL
ALBUMIN SERPL BCP-MCNC: 4.3 G/DL (ref 3.2–4.9)
ALBUMIN/GLOB SERPL: 1.5 G/DL
ALP SERPL-CCNC: 74 U/L (ref 30–99)
ALT SERPL-CCNC: 13 U/L (ref 2–50)
ANION GAP SERPL CALC-SCNC: 9 MMOL/L (ref 7–16)
AST SERPL-CCNC: 16 U/L (ref 12–45)
BILIRUB SERPL-MCNC: 0.4 MG/DL (ref 0.1–1.5)
BLD GP AB SCN SERPL QL: NORMAL
BUN SERPL-MCNC: 10 MG/DL (ref 8–22)
CALCIUM ALBUM COR SERPL-MCNC: 9.6 MG/DL (ref 8.5–10.5)
CALCIUM SERPL-MCNC: 9.8 MG/DL (ref 8.5–10.5)
CHLORIDE SERPL-SCNC: 100 MMOL/L (ref 96–112)
CO2 SERPL-SCNC: 31 MMOL/L (ref 20–33)
CREAT SERPL-MCNC: 0.78 MG/DL (ref 0.5–1.4)
EKG IMPRESSION: NORMAL
ERYTHROCYTE [DISTWIDTH] IN BLOOD BY AUTOMATED COUNT: 47.8 FL (ref 35.9–50)
GFR SERPLBLD CREATININE-BSD FMLA CKD-EPI: 105 ML/MIN/1.73 M 2
GLOBULIN SER CALC-MCNC: 2.9 G/DL (ref 1.9–3.5)
GLUCOSE SERPL-MCNC: 95 MG/DL (ref 65–99)
HCT VFR BLD AUTO: 42.7 % (ref 42–52)
HGB BLD-MCNC: 14.4 G/DL (ref 14–18)
MCH RBC QN AUTO: 35.4 PG (ref 27–33)
MCHC RBC AUTO-ENTMCNC: 33.7 G/DL (ref 33.7–35.3)
MCV RBC AUTO: 104.9 FL (ref 81.4–97.8)
PLATELET # BLD AUTO: 171 K/UL (ref 164–446)
PMV BLD AUTO: 10.2 FL (ref 9–12.9)
POTASSIUM SERPL-SCNC: 3.7 MMOL/L (ref 3.6–5.5)
PROT SERPL-MCNC: 7.2 G/DL (ref 6–8.2)
RBC # BLD AUTO: 4.07 M/UL (ref 4.7–6.1)
RH BLD: NORMAL
SODIUM SERPL-SCNC: 140 MMOL/L (ref 135–145)
WBC # BLD AUTO: 6.6 K/UL (ref 4.8–10.8)

## 2023-01-31 PROCEDURE — 93005 ELECTROCARDIOGRAM TRACING: CPT

## 2023-01-31 PROCEDURE — 86850 RBC ANTIBODY SCREEN: CPT

## 2023-01-31 PROCEDURE — 86900 BLOOD TYPING SEROLOGIC ABO: CPT

## 2023-01-31 PROCEDURE — 80053 COMPREHEN METABOLIC PANEL: CPT

## 2023-01-31 PROCEDURE — 93010 ELECTROCARDIOGRAM REPORT: CPT | Performed by: INTERNAL MEDICINE

## 2023-01-31 PROCEDURE — 85027 COMPLETE CBC AUTOMATED: CPT

## 2023-01-31 PROCEDURE — 86901 BLOOD TYPING SEROLOGIC RH(D): CPT

## 2023-01-31 PROCEDURE — 36415 COLL VENOUS BLD VENIPUNCTURE: CPT

## 2023-02-01 ENCOUNTER — APPOINTMENT (OUTPATIENT)
Dept: RADIOLOGY | Facility: MEDICAL CENTER | Age: 58
DRG: 164 | End: 2023-02-01
Attending: PHYSICIAN ASSISTANT
Payer: COMMERCIAL

## 2023-02-01 ENCOUNTER — ANESTHESIA (OUTPATIENT)
Dept: SURGERY | Facility: MEDICAL CENTER | Age: 58
DRG: 164 | End: 2023-02-01
Payer: COMMERCIAL

## 2023-02-01 ENCOUNTER — HOSPITAL ENCOUNTER (INPATIENT)
Facility: MEDICAL CENTER | Age: 58
LOS: 4 days | DRG: 164 | End: 2023-02-05
Attending: SURGERY | Admitting: SURGERY
Payer: COMMERCIAL

## 2023-02-01 ENCOUNTER — ANESTHESIA EVENT (OUTPATIENT)
Dept: SURGERY | Facility: MEDICAL CENTER | Age: 58
DRG: 164 | End: 2023-02-01
Payer: COMMERCIAL

## 2023-02-01 DIAGNOSIS — G89.18 POSTOPERATIVE PAIN: ICD-10-CM

## 2023-02-01 LAB
ABO + RH BLD: NORMAL
PATHOLOGY CONSULT NOTE: NORMAL

## 2023-02-01 PROCEDURE — C1729 CATH, DRAINAGE: HCPCS | Performed by: SURGERY

## 2023-02-01 PROCEDURE — 36620 INSERTION CATHETER ARTERY: CPT | Performed by: STUDENT IN AN ORGANIZED HEALTH CARE EDUCATION/TRAINING PROGRAM

## 2023-02-01 PROCEDURE — 700111 HCHG RX REV CODE 636 W/ 250 OVERRIDE (IP): Performed by: PHYSICIAN ASSISTANT

## 2023-02-01 PROCEDURE — 07B70ZZ EXCISION OF THORAX LYMPHATIC, OPEN APPROACH: ICD-10-PCS | Performed by: SURGERY

## 2023-02-01 PROCEDURE — 700102 HCHG RX REV CODE 250 W/ 637 OVERRIDE(OP): Performed by: STUDENT IN AN ORGANIZED HEALTH CARE EDUCATION/TRAINING PROGRAM

## 2023-02-01 PROCEDURE — 160041 HCHG SURGERY MINUTES - EA ADDL 1 MIN LEVEL 4: Performed by: SURGERY

## 2023-02-01 PROCEDURE — 160029 HCHG SURGERY MINUTES - 1ST 30 MINS LEVEL 4: Performed by: SURGERY

## 2023-02-01 PROCEDURE — A9270 NON-COVERED ITEM OR SERVICE: HCPCS | Performed by: STUDENT IN AN ORGANIZED HEALTH CARE EDUCATION/TRAINING PROGRAM

## 2023-02-01 PROCEDURE — 88307 TISSUE EXAM BY PATHOLOGIST: CPT | Mod: 59

## 2023-02-01 PROCEDURE — 700101 HCHG RX REV CODE 250: Performed by: STUDENT IN AN ORGANIZED HEALTH CARE EDUCATION/TRAINING PROGRAM

## 2023-02-01 PROCEDURE — 160002 HCHG RECOVERY MINUTES (STAT): Performed by: SURGERY

## 2023-02-01 PROCEDURE — 0BTD0ZZ RESECTION OF RIGHT MIDDLE LUNG LOBE, OPEN APPROACH: ICD-10-PCS | Performed by: SURGERY

## 2023-02-01 PROCEDURE — 0BJ08ZZ INSPECTION OF TRACHEOBRONCHIAL TREE, VIA NATURAL OR ARTIFICIAL OPENING ENDOSCOPIC: ICD-10-PCS | Performed by: SURGERY

## 2023-02-01 PROCEDURE — 71045 X-RAY EXAM CHEST 1 VIEW: CPT

## 2023-02-01 PROCEDURE — 160048 HCHG OR STATISTICAL LEVEL 1-5: Performed by: SURGERY

## 2023-02-01 PROCEDURE — 36415 COLL VENOUS BLD VENIPUNCTURE: CPT

## 2023-02-01 PROCEDURE — 700111 HCHG RX REV CODE 636 W/ 250 OVERRIDE (IP): Performed by: STUDENT IN AN ORGANIZED HEALTH CARE EDUCATION/TRAINING PROGRAM

## 2023-02-01 PROCEDURE — 88309 TISSUE EXAM BY PATHOLOGIST: CPT

## 2023-02-01 PROCEDURE — 700105 HCHG RX REV CODE 258: Performed by: PHYSICIAN ASSISTANT

## 2023-02-01 PROCEDURE — 700105 HCHG RX REV CODE 258: Performed by: SURGERY

## 2023-02-01 PROCEDURE — 0BTF0ZZ RESECTION OF RIGHT LOWER LUNG LOBE, OPEN APPROACH: ICD-10-PCS | Performed by: SURGERY

## 2023-02-01 PROCEDURE — 160035 HCHG PACU - 1ST 60 MINS PHASE I: Performed by: SURGERY

## 2023-02-01 PROCEDURE — 700105 HCHG RX REV CODE 258: Performed by: STUDENT IN AN ORGANIZED HEALTH CARE EDUCATION/TRAINING PROGRAM

## 2023-02-01 PROCEDURE — 770001 HCHG ROOM/CARE - MED/SURG/GYN PRIV*

## 2023-02-01 PROCEDURE — 160009 HCHG ANES TIME/MIN: Performed by: SURGERY

## 2023-02-01 PROCEDURE — 00541 ANES THRCM PX 1 LUNG VNTJ: CPT | Performed by: STUDENT IN AN ORGANIZED HEALTH CARE EDUCATION/TRAINING PROGRAM

## 2023-02-01 PROCEDURE — 88305 TISSUE EXAM BY PATHOLOGIST: CPT

## 2023-02-01 PROCEDURE — 160036 HCHG PACU - EA ADDL 30 MINS PHASE I: Performed by: SURGERY

## 2023-02-01 PROCEDURE — 700101 HCHG RX REV CODE 250: Performed by: SURGERY

## 2023-02-01 RX ORDER — DIPHENHYDRAMINE HCL 25 MG
25 TABLET ORAL EVERY 6 HOURS PRN
Status: DISCONTINUED | OUTPATIENT
Start: 2023-02-01 | End: 2023-02-05 | Stop reason: HOSPADM

## 2023-02-01 RX ORDER — HYDROMORPHONE HYDROCHLORIDE 1 MG/ML
0.2 INJECTION, SOLUTION INTRAMUSCULAR; INTRAVENOUS; SUBCUTANEOUS
Status: DISCONTINUED | OUTPATIENT
Start: 2023-02-01 | End: 2023-02-01 | Stop reason: HOSPADM

## 2023-02-01 RX ORDER — DIPHENHYDRAMINE HYDROCHLORIDE 50 MG/ML
25 INJECTION INTRAMUSCULAR; INTRAVENOUS EVERY 6 HOURS PRN
Status: DISCONTINUED | OUTPATIENT
Start: 2023-02-01 | End: 2023-02-05 | Stop reason: HOSPADM

## 2023-02-01 RX ORDER — HYDROMORPHONE HYDROCHLORIDE 1 MG/ML
0.1 INJECTION, SOLUTION INTRAMUSCULAR; INTRAVENOUS; SUBCUTANEOUS
Status: DISCONTINUED | OUTPATIENT
Start: 2023-02-01 | End: 2023-02-01 | Stop reason: HOSPADM

## 2023-02-01 RX ORDER — HALOPERIDOL 5 MG/ML
1 INJECTION INTRAMUSCULAR
Status: DISCONTINUED | OUTPATIENT
Start: 2023-02-01 | End: 2023-02-01 | Stop reason: HOSPADM

## 2023-02-01 RX ORDER — OXYCODONE HYDROCHLORIDE 5 MG/1
5 TABLET ORAL EVERY 4 HOURS PRN
Status: DISCONTINUED | OUTPATIENT
Start: 2023-02-01 | End: 2023-02-03

## 2023-02-01 RX ORDER — DEXAMETHASONE SODIUM PHOSPHATE 4 MG/ML
INJECTION, SOLUTION INTRA-ARTICULAR; INTRALESIONAL; INTRAMUSCULAR; INTRAVENOUS; SOFT TISSUE PRN
Status: DISCONTINUED | OUTPATIENT
Start: 2023-02-01 | End: 2023-02-01 | Stop reason: SURG

## 2023-02-01 RX ORDER — ONDANSETRON 2 MG/ML
4 INJECTION INTRAMUSCULAR; INTRAVENOUS EVERY 4 HOURS PRN
Status: DISCONTINUED | OUTPATIENT
Start: 2023-02-01 | End: 2023-02-05 | Stop reason: HOSPADM

## 2023-02-01 RX ORDER — SCOLOPAMINE TRANSDERMAL SYSTEM 1 MG/1
1 PATCH, EXTENDED RELEASE TRANSDERMAL
Status: DISCONTINUED | OUTPATIENT
Start: 2023-02-01 | End: 2023-02-01 | Stop reason: HOSPADM

## 2023-02-01 RX ORDER — ONDANSETRON 2 MG/ML
INJECTION INTRAMUSCULAR; INTRAVENOUS PRN
Status: DISCONTINUED | OUTPATIENT
Start: 2023-02-01 | End: 2023-02-01 | Stop reason: SURG

## 2023-02-01 RX ORDER — ROCURONIUM BROMIDE 10 MG/ML
INJECTION, SOLUTION INTRAVENOUS PRN
Status: DISCONTINUED | OUTPATIENT
Start: 2023-02-01 | End: 2023-02-01 | Stop reason: SURG

## 2023-02-01 RX ORDER — LIDOCAINE HYDROCHLORIDE 40 MG/ML
SOLUTION TOPICAL PRN
Status: DISCONTINUED | OUTPATIENT
Start: 2023-02-01 | End: 2023-02-01 | Stop reason: SURG

## 2023-02-01 RX ORDER — SODIUM CHLORIDE, SODIUM LACTATE, POTASSIUM CHLORIDE, CALCIUM CHLORIDE 600; 310; 30; 20 MG/100ML; MG/100ML; MG/100ML; MG/100ML
INJECTION, SOLUTION INTRAVENOUS CONTINUOUS
Status: DISCONTINUED | OUTPATIENT
Start: 2023-02-01 | End: 2023-02-01 | Stop reason: HOSPADM

## 2023-02-01 RX ORDER — ACETAMINOPHEN 500 MG
1000 TABLET ORAL ONCE
Status: COMPLETED | OUTPATIENT
Start: 2023-02-01 | End: 2023-02-01

## 2023-02-01 RX ORDER — GABAPENTIN 300 MG/1
300 CAPSULE ORAL ONCE
Status: COMPLETED | OUTPATIENT
Start: 2023-02-01 | End: 2023-02-01

## 2023-02-01 RX ORDER — SODIUM CHLORIDE, SODIUM LACTATE, POTASSIUM CHLORIDE, CALCIUM CHLORIDE 600; 310; 30; 20 MG/100ML; MG/100ML; MG/100ML; MG/100ML
INJECTION, SOLUTION INTRAVENOUS CONTINUOUS
Status: ACTIVE | OUTPATIENT
Start: 2023-02-01 | End: 2023-02-01

## 2023-02-01 RX ORDER — OXYCODONE HCL 10 MG/1
10 TABLET, FILM COATED, EXTENDED RELEASE ORAL ONCE
Status: COMPLETED | OUTPATIENT
Start: 2023-02-01 | End: 2023-02-01

## 2023-02-01 RX ORDER — HYDROMORPHONE HYDROCHLORIDE 1 MG/ML
0.5 INJECTION, SOLUTION INTRAMUSCULAR; INTRAVENOUS; SUBCUTANEOUS ONCE
Status: COMPLETED | OUTPATIENT
Start: 2023-02-01 | End: 2023-02-01

## 2023-02-01 RX ORDER — ONDANSETRON 2 MG/ML
4 INJECTION INTRAMUSCULAR; INTRAVENOUS
Status: DISCONTINUED | OUTPATIENT
Start: 2023-02-01 | End: 2023-02-01 | Stop reason: HOSPADM

## 2023-02-01 RX ORDER — KETOROLAC TROMETHAMINE 30 MG/ML
30 INJECTION, SOLUTION INTRAMUSCULAR; INTRAVENOUS EVERY 6 HOURS PRN
Status: DISPENSED | OUTPATIENT
Start: 2023-02-01 | End: 2023-02-04

## 2023-02-01 RX ORDER — HYDROMORPHONE HYDROCHLORIDE 2 MG/ML
INJECTION, SOLUTION INTRAMUSCULAR; INTRAVENOUS; SUBCUTANEOUS PRN
Status: DISCONTINUED | OUTPATIENT
Start: 2023-02-01 | End: 2023-02-01 | Stop reason: SURG

## 2023-02-01 RX ORDER — ENOXAPARIN SODIUM 100 MG/ML
40 INJECTION SUBCUTANEOUS DAILY
Status: DISCONTINUED | OUTPATIENT
Start: 2023-02-02 | End: 2023-02-05 | Stop reason: HOSPADM

## 2023-02-01 RX ORDER — FAMOTIDINE 20 MG/1
20 TABLET, FILM COATED ORAL 2 TIMES DAILY
Status: DISCONTINUED | OUTPATIENT
Start: 2023-02-01 | End: 2023-02-05 | Stop reason: HOSPADM

## 2023-02-01 RX ORDER — CELECOXIB 200 MG/1
400 CAPSULE ORAL ONCE
Status: COMPLETED | OUTPATIENT
Start: 2023-02-01 | End: 2023-02-01

## 2023-02-01 RX ORDER — HYDRALAZINE HYDROCHLORIDE 20 MG/ML
5 INJECTION INTRAMUSCULAR; INTRAVENOUS
Status: DISCONTINUED | OUTPATIENT
Start: 2023-02-01 | End: 2023-02-01 | Stop reason: HOSPADM

## 2023-02-01 RX ORDER — ENALAPRILAT 1.25 MG/ML
2.5 INJECTION INTRAVENOUS EVERY 6 HOURS PRN
Status: DISCONTINUED | OUTPATIENT
Start: 2023-02-01 | End: 2023-02-05 | Stop reason: HOSPADM

## 2023-02-01 RX ORDER — DIPHENHYDRAMINE HYDROCHLORIDE 50 MG/ML
12.5 INJECTION INTRAMUSCULAR; INTRAVENOUS
Status: DISCONTINUED | OUTPATIENT
Start: 2023-02-01 | End: 2023-02-01 | Stop reason: HOSPADM

## 2023-02-01 RX ORDER — VANCOMYCIN HYDROCHLORIDE 1 G/20ML
INJECTION, POWDER, LYOPHILIZED, FOR SOLUTION INTRAVENOUS PRN
Status: DISCONTINUED | OUTPATIENT
Start: 2023-02-01 | End: 2023-02-01 | Stop reason: SURG

## 2023-02-01 RX ORDER — OXYCODONE HCL 5 MG/5 ML
10 SOLUTION, ORAL ORAL
Status: COMPLETED | OUTPATIENT
Start: 2023-02-01 | End: 2023-02-01

## 2023-02-01 RX ORDER — LIDOCAINE HYDROCHLORIDE 20 MG/ML
INJECTION, SOLUTION EPIDURAL; INFILTRATION; INTRACAUDAL; PERINEURAL PRN
Status: DISCONTINUED | OUTPATIENT
Start: 2023-02-01 | End: 2023-02-01 | Stop reason: SURG

## 2023-02-01 RX ORDER — HYDROMORPHONE HYDROCHLORIDE 1 MG/ML
0.4 INJECTION, SOLUTION INTRAMUSCULAR; INTRAVENOUS; SUBCUTANEOUS
Status: DISCONTINUED | OUTPATIENT
Start: 2023-02-01 | End: 2023-02-01 | Stop reason: HOSPADM

## 2023-02-01 RX ORDER — OXYCODONE HCL 5 MG/5 ML
5 SOLUTION, ORAL ORAL
Status: COMPLETED | OUTPATIENT
Start: 2023-02-01 | End: 2023-02-01

## 2023-02-01 RX ORDER — SODIUM CHLORIDE, SODIUM LACTATE, POTASSIUM CHLORIDE, CALCIUM CHLORIDE 600; 310; 30; 20 MG/100ML; MG/100ML; MG/100ML; MG/100ML
INJECTION, SOLUTION INTRAVENOUS CONTINUOUS
Status: DISCONTINUED | OUTPATIENT
Start: 2023-02-01 | End: 2023-02-03

## 2023-02-01 RX ORDER — MEPERIDINE HYDROCHLORIDE 25 MG/ML
12.5 INJECTION INTRAMUSCULAR; INTRAVENOUS; SUBCUTANEOUS
Status: DISCONTINUED | OUTPATIENT
Start: 2023-02-01 | End: 2023-02-01 | Stop reason: HOSPADM

## 2023-02-01 RX ORDER — PHENYLEPHRINE HYDROCHLORIDE 10 MG/ML
INJECTION, SOLUTION INTRAMUSCULAR; INTRAVENOUS; SUBCUTANEOUS PRN
Status: DISCONTINUED | OUTPATIENT
Start: 2023-02-01 | End: 2023-02-01 | Stop reason: SURG

## 2023-02-01 RX ORDER — HYDRALAZINE HYDROCHLORIDE 20 MG/ML
10 INJECTION INTRAMUSCULAR; INTRAVENOUS EVERY 6 HOURS PRN
Status: DISCONTINUED | OUTPATIENT
Start: 2023-02-01 | End: 2023-02-05 | Stop reason: HOSPADM

## 2023-02-01 RX ORDER — BUPIVACAINE HYDROCHLORIDE AND EPINEPHRINE 5; 5 MG/ML; UG/ML
INJECTION, SOLUTION PERINEURAL
Status: DISCONTINUED | OUTPATIENT
Start: 2023-02-01 | End: 2023-02-01 | Stop reason: HOSPADM

## 2023-02-01 RX ORDER — MIDAZOLAM HYDROCHLORIDE 1 MG/ML
INJECTION INTRAMUSCULAR; INTRAVENOUS PRN
Status: DISCONTINUED | OUTPATIENT
Start: 2023-02-01 | End: 2023-02-01 | Stop reason: SURG

## 2023-02-01 RX ADMIN — VANCOMYCIN HYDROCHLORIDE 1 G: 1 INJECTION, POWDER, LYOPHILIZED, FOR SOLUTION INTRAVENOUS at 11:07

## 2023-02-01 RX ADMIN — ROCURONIUM BROMIDE 30 MG: 10 INJECTION, SOLUTION INTRAVENOUS at 10:49

## 2023-02-01 RX ADMIN — FENTANYL CITRATE 50 MCG: 50 INJECTION, SOLUTION INTRAMUSCULAR; INTRAVENOUS at 14:38

## 2023-02-01 RX ADMIN — CELECOXIB 400 MG: 200 CAPSULE ORAL at 09:26

## 2023-02-01 RX ADMIN — LIDOCAINE HYDROCHLORIDE 4 ML: 40 SOLUTION TOPICAL at 10:35

## 2023-02-01 RX ADMIN — LIDOCAINE HYDROCHLORIDE 100 MG: 20 INJECTION, SOLUTION EPIDURAL; INFILTRATION; INTRACAUDAL at 10:35

## 2023-02-01 RX ADMIN — PHENYLEPHRINE HYDROCHLORIDE 25 MCG/MIN: 10 INJECTION INTRAVENOUS at 11:44

## 2023-02-01 RX ADMIN — METHOCARBAMOL 1000 MG: 100 INJECTION INTRAMUSCULAR; INTRAVENOUS at 14:12

## 2023-02-01 RX ADMIN — DEXAMETHASONE SODIUM PHOSPHATE 4 MG: 4 INJECTION, SOLUTION INTRA-ARTICULAR; INTRALESIONAL; INTRAMUSCULAR; INTRAVENOUS; SOFT TISSUE at 10:44

## 2023-02-01 RX ADMIN — PROPOFOL 200 MG: 10 INJECTION, EMULSION INTRAVENOUS at 10:35

## 2023-02-01 RX ADMIN — SUGAMMADEX 200 MG: 100 INJECTION, SOLUTION INTRAVENOUS at 13:03

## 2023-02-01 RX ADMIN — HYDROMORPHONE HYDROCHLORIDE 0.5 MG: 1 INJECTION, SOLUTION INTRAMUSCULAR; INTRAVENOUS; SUBCUTANEOUS at 16:21

## 2023-02-01 RX ADMIN — SCOPOLAMINE 1 PATCH: 1.5 PATCH, EXTENDED RELEASE TRANSDERMAL at 09:26

## 2023-02-01 RX ADMIN — FENTANYL CITRATE 50 MCG: 50 INJECTION, SOLUTION INTRAMUSCULAR; INTRAVENOUS at 10:35

## 2023-02-01 RX ADMIN — PHENYLEPHRINE HYDROCHLORIDE 100 MCG: 10 INJECTION INTRAVENOUS at 11:40

## 2023-02-01 RX ADMIN — ROCURONIUM BROMIDE 20 MG: 10 INJECTION, SOLUTION INTRAVENOUS at 11:50

## 2023-02-01 RX ADMIN — OXYCODONE HYDROCHLORIDE 10 MG: 5 SOLUTION ORAL at 14:38

## 2023-02-01 RX ADMIN — HYDROMORPHONE HYDROCHLORIDE: 10 INJECTION, SOLUTION INTRAMUSCULAR; INTRAVENOUS; SUBCUTANEOUS at 16:24

## 2023-02-01 RX ADMIN — PHENYLEPHRINE HYDROCHLORIDE 100 MCG: 10 INJECTION INTRAVENOUS at 11:36

## 2023-02-01 RX ADMIN — MIDAZOLAM HYDROCHLORIDE 2 MG: 1 INJECTION, SOLUTION INTRAMUSCULAR; INTRAVENOUS at 10:27

## 2023-02-01 RX ADMIN — FENTANYL CITRATE 100 MCG: 50 INJECTION, SOLUTION INTRAMUSCULAR; INTRAVENOUS at 11:24

## 2023-02-01 RX ADMIN — FENTANYL CITRATE 100 MCG: 50 INJECTION, SOLUTION INTRAMUSCULAR; INTRAVENOUS at 11:13

## 2023-02-01 RX ADMIN — SODIUM CHLORIDE, POTASSIUM CHLORIDE, SODIUM LACTATE AND CALCIUM CHLORIDE: 600; 310; 30; 20 INJECTION, SOLUTION INTRAVENOUS at 16:22

## 2023-02-01 RX ADMIN — SODIUM CHLORIDE, POTASSIUM CHLORIDE, SODIUM LACTATE AND CALCIUM CHLORIDE: 600; 310; 30; 20 INJECTION, SOLUTION INTRAVENOUS at 09:26

## 2023-02-01 RX ADMIN — OXYCODONE HYDROCHLORIDE 10 MG: 10 TABLET, FILM COATED, EXTENDED RELEASE ORAL at 09:27

## 2023-02-01 RX ADMIN — GABAPENTIN 300 MG: 300 CAPSULE ORAL at 09:26

## 2023-02-01 RX ADMIN — ACETAMINOPHEN 1000 MG: 500 TABLET, FILM COATED ORAL at 09:26

## 2023-02-01 RX ADMIN — HYDROMORPHONE HYDROCHLORIDE 0.5 MG: 2 INJECTION INTRAMUSCULAR; INTRAVENOUS; SUBCUTANEOUS at 10:46

## 2023-02-01 RX ADMIN — ONDANSETRON 4 MG: 2 INJECTION INTRAMUSCULAR; INTRAVENOUS at 13:01

## 2023-02-01 RX ADMIN — ROCURONIUM BROMIDE 20 MG: 10 INJECTION, SOLUTION INTRAVENOUS at 12:23

## 2023-02-01 RX ADMIN — HYDROMORPHONE HYDROCHLORIDE 0.5 MG: 2 INJECTION INTRAMUSCULAR; INTRAVENOUS; SUBCUTANEOUS at 12:38

## 2023-02-01 RX ADMIN — PHENYLEPHRINE HYDROCHLORIDE 100 MCG: 10 INJECTION INTRAVENOUS at 11:19

## 2023-02-01 RX ADMIN — FAMOTIDINE 20 MG: 10 INJECTION, SOLUTION INTRAVENOUS at 18:36

## 2023-02-01 RX ADMIN — ROCURONIUM BROMIDE 50 MG: 10 INJECTION, SOLUTION INTRAVENOUS at 10:35

## 2023-02-01 RX ADMIN — HYDROMORPHONE HYDROCHLORIDE 1 MG: 2 INJECTION INTRAMUSCULAR; INTRAVENOUS; SUBCUTANEOUS at 11:30

## 2023-02-01 RX ADMIN — SODIUM CHLORIDE, POTASSIUM CHLORIDE, SODIUM LACTATE AND CALCIUM CHLORIDE: 600; 310; 30; 20 INJECTION, SOLUTION INTRAVENOUS at 11:19

## 2023-02-01 ASSESSMENT — COGNITIVE AND FUNCTIONAL STATUS - GENERAL
TURNING FROM BACK TO SIDE WHILE IN FLAT BAD: A LITTLE
SUGGESTED CMS G CODE MODIFIER DAILY ACTIVITY: CJ
MOVING TO AND FROM BED TO CHAIR: A LITTLE
SUGGESTED CMS G CODE MODIFIER MOBILITY: CK
WALKING IN HOSPITAL ROOM: A LITTLE
MOVING FROM LYING ON BACK TO SITTING ON SIDE OF FLAT BED: A LITTLE
SUGGESTED CMS G CODE MODIFIER DAILY ACTIVITY: CJ
TOILETING: A LITTLE
HELP NEEDED FOR BATHING: A LITTLE
CLIMB 3 TO 5 STEPS WITH RAILING: A LITTLE
DAILY ACTIVITIY SCORE: 22
MOVING TO AND FROM BED TO CHAIR: A LITTLE
WALKING IN HOSPITAL ROOM: A LITTLE
TURNING FROM BACK TO SIDE WHILE IN FLAT BAD: A LITTLE
STANDING UP FROM CHAIR USING ARMS: A LITTLE
STANDING UP FROM CHAIR USING ARMS: A LITTLE
DRESSING REGULAR LOWER BODY CLOTHING: A LITTLE
HELP NEEDED FOR BATHING: A LITTLE
MOVING FROM LYING ON BACK TO SITTING ON SIDE OF FLAT BED: A LITTLE
DAILY ACTIVITIY SCORE: 21
MOBILITY SCORE: 18
DRESSING REGULAR LOWER BODY CLOTHING: A LITTLE

## 2023-02-01 ASSESSMENT — PATIENT HEALTH QUESTIONNAIRE - PHQ9
SUM OF ALL RESPONSES TO PHQ9 QUESTIONS 1 AND 2: 0
1. LITTLE INTEREST OR PLEASURE IN DOING THINGS: NOT AT ALL
2. FEELING DOWN, DEPRESSED, IRRITABLE, OR HOPELESS: NOT AT ALL

## 2023-02-01 ASSESSMENT — PAIN DESCRIPTION - PAIN TYPE
TYPE: SURGICAL PAIN
TYPE: SURGICAL PAIN
TYPE: ACUTE PAIN
TYPE: SURGICAL PAIN

## 2023-02-01 ASSESSMENT — FIBROSIS 4 INDEX: FIB4 SCORE: 1.43

## 2023-02-01 ASSESSMENT — LIFESTYLE VARIABLES
TOTAL SCORE: 0
HOW MANY TIMES IN THE PAST YEAR HAVE YOU HAD 5 OR MORE DRINKS IN A DAY: 0
ON A TYPICAL DAY WHEN YOU DRINK ALCOHOL HOW MANY DRINKS DO YOU HAVE: 0
HAVE YOU EVER FELT YOU SHOULD CUT DOWN ON YOUR DRINKING: NO
EVER HAD A DRINK FIRST THING IN THE MORNING TO STEADY YOUR NERVES TO GET RID OF A HANGOVER: NO
AVERAGE NUMBER OF DAYS PER WEEK YOU HAVE A DRINK CONTAINING ALCOHOL: 0
TOTAL SCORE: 0
DOES PATIENT WANT TO STOP DRINKING: NO
CONSUMPTION TOTAL: NEGATIVE
HAVE PEOPLE ANNOYED YOU BY CRITICIZING YOUR DRINKING: NO
TOTAL SCORE: 0
ALCOHOL_USE: NO
EVER FELT BAD OR GUILTY ABOUT YOUR DRINKING: NO

## 2023-02-01 ASSESSMENT — PAIN SCALES - GENERAL: PAIN_LEVEL: 5

## 2023-02-01 NOTE — OP REPORT
DATE OF SERVICE:  02/01/2023     PREOPERATIVE DIAGNOSIS:  Right middle lobe lung cancer.     POSTOPERATIVE DIAGNOSIS:  Right middle lobe lung cancer.     PROCEDURES:  1.  Diagnostic bronchoscopy.  2.  Right thoracotomy with middle and lower lobectomy.  3.  Thoracic lymphadenectomy.     SURGEON:  John H. Ganser, MD     ASSISTANT:  Frantz Coffman PA-C     ANESTHESIA:  General.     ANESTHESIOLOGIST:  Junaid Green DO     INDICATIONS:  The patient is a 57-year-old male who was found to have a right   lung mass.  It appeared to occlude the middle lobe orifice.  There was   adenopathy and encasement of the descending pulmonary artery.  He had a   history of testicular cancer and had a lung metastasis removed via right   thoracotomy in remote past, but the pathology confirmed adenocarcinoma of lung   origin with a positive station 11 node.  The mass was close to his upper lobe   vein and in order to shrink the mass back to allow preservation of the upper   lobe, he underwent neoadjuvant chemotherapy and immunotherapy with good   clinical response.  His lung function is well preserved with an FEV1 of 3.64   and a normal gas exchange.  We discussed risks, benefits, and alternatives to   thoracotomy with attempted middle lobe and lower lobectomy with the   possibility for pneumonectomy and also lymph node dissection.  He wished to   proceed.     FINDINGS:  On bronchoscopy, there was tumor emanating from the middle lobe   bronchus, but nothing more proximal.  At thoracotomy, there was a very central   mass, but the upper lobe vein was able to be preserved in the upper lobe   completely preserved.  There were enlarged hilar and subcarinal nodes and full   lymphadenectomy was carried out.     DESCRIPTION OF PROCEDURE:  The patient identified, general anesthetic   administered with a double-lumen endotracheal tube.  An arterial line was   placed.  He was placed in the left lateral decubitus position and his right   chest  prepped and draped in the usual sterile fashion.  A posterolateral   thoracotomy incision was made.  The chest entered in the previously entered   fourth intercostal space.  A portion of the 5th rib was divided posteriorly   and a Finochietto rib  was inserted.  Adhesions from prior surgery   were taken down sharply.  Inspection showed no evidence of pleural metastases   or effusion.  The middle lobe was completely collapsed.     Dissection begun by dividing the inferior pulmonary ligament.  Several nodes   were sent as station 9.  The lung was then rotated anteriorly and the   posterior hilar pleura divided and multiple station 7 lymph nodes dissected   away from the bronchus and sent separately.  The anterior hilar pleura was   then divided.  The middle lobe vein was  from the upper lobe vein.    This was divided with the powered 35 mm vascular stapler.  The minor fissure   was then completed with the stapler using a white load.  I was then able to   dissect the rest of the minor fissure staying below the vein and in front of   the artery and this was divided with the stapler using a white load.    Adenopathy and mass were palpable fairly close to the upper lobe vein.  I then   circumferentially dissected the bronchus intermedius and divided that with   the 45 stapler using a gold load.  The inferior pulmonary vein was divided   with the stapler using a white load.  This left just tissue between the tumor   and below the vein and palpation confirmed grossly clear margin and this was   divided with the stapler using a green load with good control.  Specimen was   sent for permanent histology.  The lung was reexpanded.  There was minimal air   leak along the hilum.  The chest was irrigated and hemostasis assured.    Additional station 4 lymph nodes were taken as well as a previously taken   station 9, 7, 10, and 11 chiquita stations.     A 28-Romansh straight chest tube was placed through the incision  below the main   incision angled towards the apex and secured to the skin with silk.  The ribs   were reapproximated with interrupted #1 Vicryl pericostal sutures.  The   muscle was closed in 2 layers with 0 Vicryl and the skin was closed with   staples.  Sterile dressing was applied.  The tube attached to pleural suction   device and the patient returned to recovery in stable condition.     An assistant was required in this case due to complexity, need to assist with   retraction, exposure, and closure.     Sponge and needle counts were correct x2 at the end of procedure.     ESTIMATED BLOOD LOSS:  Approximately 250 mL.        ______________________________  JOHN H. GANSER, MD [facility]    DD:  02/01/2023 13:14  DT:  02/01/2023 14:07    Job#:  [id number]    CC:Clementine Howe MD(User)  Yovany Packer MD

## 2023-02-01 NOTE — ANESTHESIA TIME REPORT
Anesthesia Start and Stop Event Times     Date Time Event    2/1/2023 0946 Ready for Procedure     1027 Anesthesia Start     1321 Anesthesia Stop        Responsible Staff  02/01/23    Name Role Begin End    Junaid Green D.O. Anesth 1027 1321        Overtime Reason:  no overtime (within assigned shift)    Comments:

## 2023-02-01 NOTE — OR NURSING
PACU note- Patient arrived from the OR, breathing easily, R chest surgical site, dressing C/D/I. Chest tube set up to suction. Patient is able to move all extremities, strong radial and pedal pulses, good capillary refill. Patient medicated for pain with good effect. Denies any nausea at this time. Spouse updated.

## 2023-02-01 NOTE — OR SURGEON
Immediate Post OP Note    PreOp Diagnosis: Right middle lobe lung cancer      PostOp Diagnosis: Same      Procedure(s):  BRONCHOSCOPY  RIGHT THORACOTOMY, MIDDLE AND LOWER LOBECTOMY, NODE DISSECTION - Wound Class: Clean  LYMPHADENECTOMY - Wound Class: Clean    Surgeon(s):  John H Ganser, M.D.    Anesthesiologist/Type of Anesthesia:  Anesthesiologist: Junaid Green D.O./General    Surgical Staff:  Assistant: EVA Perry  Circulator: Ezekiel Rahman R.N.; Hugo Trammell R.N.  Relief Circulator: Rachel Strong R.N.  Relief Scrub: Javier Jamison  Scrub Person: Elissa Domingo    Specimens removed if any:  ID Type Source Tests Collected by Time Destination   A : Right Pulmonary lymph node Station 9   Tissue Lung PATHOLOGY SPECIMEN John H Ganser, M.D. 2/1/2023 11:28 AM    B : Right Station 7 Subcarinal Tissue Lung PATHOLOGY SPECIMEN John H Ganser, M.D. 2/1/2023 11:31 AM    C : Right Hilar Station 10 Tissue Lung PATHOLOGY SPECIMEN John H Ganser, M.D. 2/1/2023 11:43 AM    D : Right station 11 interlobar Tissue Lung PATHOLOGY SPECIMEN John H Ganser, M.D. 2/1/2023 12:29 PM    E : Right Station 4 Lower Paratracheal Tissue Lung PATHOLOGY SPECIMEN John H Ganser, M.D. 2/1/2023 12:41 PM    F : Right Middle and Lower Lobe Tissue Lung PATHOLOGY SPECIMEN John H Ganser, M.D. 2/1/2023 12:45 PM        Estimated Blood Loss: 250 ml    Findings: 0    Complications: 0        2/1/2023 1:06 PM John H Ganser, M.D.

## 2023-02-01 NOTE — ANESTHESIA PREPROCEDURE EVALUATION
Case: [id number] Date/Time: 02/01/23 1015    Procedures:       RIGHT THORACOTOMY, MIDDLE AND LOWER LOBECTOMY, POSSIBLE PNEUMONECTOMY, NODE DISSECTION      LYMPHADENECTOMY    Pre-op diagnosis: MALIGNANT TUMOR OF LUNG    Location: [facility] OR 08 / SURGERY [facility]    Surgeons: John H Ganser, M.D.          Relevant Problems   No relevant active problems       Physical Exam    Airway   Mallampati: II  TM distance: >3 FB  Neck ROM: full       Cardiovascular - normal exam  Rhythm: regular  Rate: normal  (-) murmur     Dental - normal exam           Pulmonary - normal exam  Breath sounds clear to auscultation     Abdominal    Neurological - normal exam                 Anesthesia Plan    ASA 3   ASA physical status 3 criteria: COPD    Plan - general       Airway plan will be ETT          Induction: intravenous    Postoperative Plan: Postoperative administration of opioids is intended.    Pertinent diagnostic labs and testing reviewed    Informed Consent:    Anesthetic plan and risks discussed with patient.    Use of blood products discussed with: patient whom consented to blood products.

## 2023-02-01 NOTE — ANESTHESIA POSTPROCEDURE EVALUATION
Patient: Babatunde Sullivan    Procedure Summary     Date: 02/01/23 Room / Location: [facility] 08 / SURGERY [facility]    Anesthesia Start: 1027 Anesthesia Stop: 1321    Procedures:       RIGHT THORACOTOMY, MIDDLE AND LOWER LOBECTOMY, NODE DISSECTION (Right: Chest)      LYMPHADENECTOMY (Right: Chest) Diagnosis: (MALIGNANT TUMOR OF LUNG)    Surgeons: John H Ganser, M.D. Responsible Provider: Junaid Green D.O.    Anesthesia Type: general ASA Status: 3          Final Anesthesia Type: general  Last vitals  BP   Blood Pressure: 120/82    Temp   36.4 °C (97.6 °F)    Pulse   84   Resp   16    SpO2   95 %      Anesthesia Post Evaluation    Patient location during evaluation: PACU  Patient participation: complete - patient participated  Level of consciousness: awake and alert  Pain score: 5    Airway patency: patent  Anesthetic complications: no  Cardiovascular status: hemodynamically stable  Respiratory status: acceptable  Hydration status: euvolemic    PONV: none          No notable events documented.     Nurse Pain Score: 0 (NPRS)

## 2023-02-01 NOTE — ANESTHESIA PROCEDURE NOTES
Airway    Date/Time: 2/1/2023 10:38 AM  Performed by: Junaid Green D.O.  Authorized by: Junaid Green D.O.     Location:  OR  Urgency:  Elective  Difficult Airway: No    Indications for Airway Management:  Anesthesia      Spontaneous Ventilation: absent    Sedation Level:  Deep  Preoxygenated: Yes    Patient Position:  Sniffing  Mask Difficulty Assessment:  3 - difficult mask (inadequate, unstable or two providers) +/- NMBA  Final Airway Type:  Endotracheal airway  Final Endotracheal Airway:  ETT - double lumen right with ONE LUNG VENTILATION  Cuffed: Yes    Technique Used for Successful ETT Placement:  Direct laryngoscopy    Insertion Site:  Oral  Blade Type:  Yordy  Laryngoscope Blade/Videolaryngoscope Blade Size:  4  ETT Double Lumen (fr):  39  Measured from:  Lips  ETT to Lips (cm):  25  Placement Verified by: auscultation and capnometry    Cormack-Lehane Classification:  Grade IIa - partial view of glottis  Number of Attempts at Approach:  1

## 2023-02-01 NOTE — ANESTHESIA PROCEDURE NOTES
Arterial Line  Performed by: Junaid Green D.O.  Authorized by: Junaid Green D.O.     Start Time:  2/1/2023 10:53 AM  End Time:  2/1/2023 10:57 AM  Localization: ultrasound guidance and surface landmarks    Patient Location:  OR  Indication: continuous blood pressure monitoring        Catheter Size:  20 G  Seldinger Technique?: Yes    Laterality:  Left  Site:  Radial artery  Line Secured:  Antimicrobial disc, tape and transparent dressing  Events: patient tolerated procedure well with no complications

## 2023-02-02 ENCOUNTER — APPOINTMENT (OUTPATIENT)
Dept: RADIOLOGY | Facility: MEDICAL CENTER | Age: 58
DRG: 164 | End: 2023-02-02
Attending: PHYSICIAN ASSISTANT
Payer: COMMERCIAL

## 2023-02-02 LAB
ANION GAP SERPL CALC-SCNC: 12 MMOL/L (ref 7–16)
BUN SERPL-MCNC: 19 MG/DL (ref 8–22)
CALCIUM SERPL-MCNC: 8.6 MG/DL (ref 8.5–10.5)
CHLORIDE SERPL-SCNC: 100 MMOL/L (ref 96–112)
CO2 SERPL-SCNC: 22 MMOL/L (ref 20–33)
CREAT SERPL-MCNC: 0.82 MG/DL (ref 0.5–1.4)
ERYTHROCYTE [DISTWIDTH] IN BLOOD BY AUTOMATED COUNT: 47.8 FL (ref 35.9–50)
GFR SERPLBLD CREATININE-BSD FMLA CKD-EPI: 104 ML/MIN/1.73 M 2
GLUCOSE SERPL-MCNC: 172 MG/DL (ref 65–99)
HCT VFR BLD AUTO: 37.1 % (ref 42–52)
HGB BLD-MCNC: 12.3 G/DL (ref 14–18)
MCH RBC QN AUTO: 35.9 PG (ref 27–33)
MCHC RBC AUTO-ENTMCNC: 33.2 G/DL (ref 33.7–35.3)
MCV RBC AUTO: 108.2 FL (ref 81.4–97.8)
PLATELET # BLD AUTO: 150 K/UL (ref 164–446)
PMV BLD AUTO: 9.8 FL (ref 9–12.9)
POTASSIUM SERPL-SCNC: 4.5 MMOL/L (ref 3.6–5.5)
RBC # BLD AUTO: 3.43 M/UL (ref 4.7–6.1)
SODIUM SERPL-SCNC: 134 MMOL/L (ref 135–145)
WBC # BLD AUTO: 11.7 K/UL (ref 4.8–10.8)

## 2023-02-02 PROCEDURE — 80048 BASIC METABOLIC PNL TOTAL CA: CPT

## 2023-02-02 PROCEDURE — 700102 HCHG RX REV CODE 250 W/ 637 OVERRIDE(OP): Performed by: PHYSICIAN ASSISTANT

## 2023-02-02 PROCEDURE — 85027 COMPLETE CBC AUTOMATED: CPT

## 2023-02-02 PROCEDURE — 770001 HCHG ROOM/CARE - MED/SURG/GYN PRIV*

## 2023-02-02 PROCEDURE — 700105 HCHG RX REV CODE 258: Performed by: PHYSICIAN ASSISTANT

## 2023-02-02 PROCEDURE — 36415 COLL VENOUS BLD VENIPUNCTURE: CPT

## 2023-02-02 PROCEDURE — A9270 NON-COVERED ITEM OR SERVICE: HCPCS | Performed by: PHYSICIAN ASSISTANT

## 2023-02-02 PROCEDURE — 71045 X-RAY EXAM CHEST 1 VIEW: CPT

## 2023-02-02 PROCEDURE — 700111 HCHG RX REV CODE 636 W/ 250 OVERRIDE (IP): Performed by: PHYSICIAN ASSISTANT

## 2023-02-02 RX ADMIN — HYDROMORPHONE HYDROCHLORIDE: 10 INJECTION, SOLUTION INTRAMUSCULAR; INTRAVENOUS; SUBCUTANEOUS at 23:56

## 2023-02-02 RX ADMIN — KETOROLAC TROMETHAMINE 30 MG: 30 INJECTION, SOLUTION INTRAMUSCULAR; INTRAVENOUS at 09:11

## 2023-02-02 RX ADMIN — FAMOTIDINE 20 MG: 20 TABLET, FILM COATED ORAL at 17:30

## 2023-02-02 RX ADMIN — ENOXAPARIN SODIUM 40 MG: 40 INJECTION SUBCUTANEOUS at 08:24

## 2023-02-02 RX ADMIN — FAMOTIDINE 20 MG: 10 INJECTION, SOLUTION INTRAVENOUS at 04:32

## 2023-02-02 ASSESSMENT — PAIN DESCRIPTION - PAIN TYPE
TYPE: ACUTE PAIN

## 2023-02-02 NOTE — PROGRESS NOTES
AA&Ox4. Denies CP/SOB.  Reporting 3/10 pain. Dilaudid PCA in place.  Educated patient regarding pharmacologic and non pharmacologic modalities for pain management.  Skin per flowsheet.  Tolerating regular diet. Denies N/V.  + void. - BM. Last BM PTA, - flatus.  Pt ambulates SB assist.  All needs met at this time. Call light within reach. Pt calls appropriately. Bed low and locked, non skid socks in place. Hourly rounding in place.

## 2023-02-02 NOTE — CARE PLAN
Problem: Pain - Standard  Goal: Alleviation of pain or a reduction in pain to the patient’s comfort goal  Outcome: Progressing     Problem: Knowledge Deficit - Standard  Goal: Patient and family/care givers will demonstrate understanding of plan of care, disease process/condition, diagnostic tests and medications  Outcome: Progressing   The patient is Stable - Low risk of patient condition declining or worsening    Shift Goals  Clinical Goals: pain control, wean o2, monitor chest tube  Patient Goals: rest. pain control    Progress made toward(s) clinical / shift goals:  pts pain managed with PCA pump. POC discussed with pt, pt verbalized understanding of plan.

## 2023-02-02 NOTE — PROGRESS NOTES
Report received from Leslie GODOY, assumed care at 1900  A0x4  Pt declines any SOB on 10L oxy mask, chest pain, new onset of numbness/tingling  Pt rates pain at 3/10, on a scale of 1-10, pt medicated per MAR  + voiding   Pt has - flatus, + bowel sounds,  BM PTA  Pt ambulates with a standby assist  Pt is tolerating a regular diet, pt denies any nausea/vomiting  Right chest tube to -20cm suction, no air leak noted. Right flank dressing intact with old drainage.   Plan of care discussed, all questions answered.Call light is within reach, treaded slipper socks on, bed in lowest/locked position, hourly rounding in place, all needs met at this time.

## 2023-02-02 NOTE — PROGRESS NOTES
4 Eyes Skin Assessment Completed by JAYNE Nelson and JAYNE Roberts.    Head WDL  Ears WDL  Nose WDL  Mouth WDL  Neck WDL  Breast/Chest  right chest tube, dressing to right flank, dressing with moderate amount of shadowing.  Shoulder Blades WDL  Spine WDL  (R) Arm/Elbow/Hand WDL  (L) Arm/Elbow/Hand WDL  Abdomen Scar  Groin WDL  Scrotum/Coccyx/Buttocks WDL  (R) Leg Scar  (L) Leg Scar  (R) Heel/Foot/Toe WDL  (L) Heel/Foot/Toe WDL          Devices In Places Pulse Ox, SCD's, and Oxy Mask      Interventions In Place N/A    Possible Skin Injury No    Pictures Uploaded Into Epic N/A  Wound Consult Placed N/A  RN Wound Prevention Protocol Ordered No

## 2023-02-02 NOTE — NON-PROVIDER
"This note is intended for the purposes of medical student education and feedback only.   Please refer to the documentation by this patient's assigned medical practitioner for details of care and plans.    Reason for admission: Babatunde Sullivan is a 57 y.o. male s/p right thoracotomy, middle and lower lobe lobectomy for adenocarcinoma of the lung POD #1.    SUBJECTIVE  Babatunde Sullivan is a 57 y.o. male s/p right thoracotomy, middle and lower lobe lobectomy for adenocarcinoma of the lung POD #1. No acute events overnight. Patient reports a baseline pain of 3/10 localized on the right chest controlled with medications. Pain worsens with urination and cough. Denies radiation of pain or SOB. No other complaints at this time. Patient tolerating food and drinks without dysphasia. Denies N/V. Able to ambulate with assistance. Denies bowel movement and flatus but reports some eructation.    OBJECTIVE   General: Negative for fevers and chills.  Respiratory: As above.  Gastrointestinal: As above.  Genitourinary: No difficulties urinating.    Physical Exam:  /79   Pulse 65   Temp 36.4 °C (97.5 °F) (Temporal)   Resp 20   Ht 1.93 m (6' 4\")   Wt (!) 128 kg (281 lb 4.9 oz)   SpO2 96%  on 3L NC    General: Well developed, well nourished, appears to be in no acute distress.  HEENT: Normocephalic, atraumatic. Mucous membranes moist. No scleral icterus  Cardio: Normal S1 and S2. Regular rate and rhythm. No murmurs, rubs, or gallops.  Pulmonary: Lungs are clear to auscultation bilaterally. No wheezes, rales, or rhonchi. No breath sounds on RLL.  Skin: Incision sites on right midaxillary area c/d/I. Chest tube in place in right midaxillary area. 550 mL drained with no air leaks.  Psych: Appropriate mood and affect.    Ins/Outs:    Intake/Output Summary (Last 24 hours) at 2/2/2023 5008  Last data filed at 2/2/2023 0647  Gross per 24 hour   Intake 3576.26 ml   Output 1635 ml   Net 1941.26 ml       Lab Results:  Recent Labs "     01/31/23  1018 02/02/23  0050   WBC 6.6 11.7*   RBC 4.07* 3.43*   HEMOGLOBIN 14.4 12.3*   HEMATOCRIT 42.7 37.1*   .9* 108.2*   MCH 35.4* 35.9*   MCHC 33.7 33.2*   RDW 47.8 47.8   PLATELETCT 171 150*   MPV 10.2 9.8     Recent Labs     01/31/23  1018 02/02/23  0050   SODIUM 140 134*   POTASSIUM 3.7 4.5   CHLORIDE 100 100   CO2 31 22   GLUCOSE 95 172*   BUN 10 19   CREATININE 0.78 0.82   CALCIUM 9.8 8.6         Recent Labs     01/31/23  1018   ASTSGOT 16   ALTSGPT 13   TBILIRUBIN 0.4   ALKPHOSPHAT 74   GLOBULIN 2.9       Imaging Results:  DX-CHEST-PORTABLE (1 VIEW)   Final Result         1.  No acute cardiopulmonary disease.      DX-CHEST-LIMITED (1 VIEW)   Final Result      1.  Right chest tube. No pneumothorax.   2.  No focal consolidation or pleural effusions.          Current Medications    Current Facility-Administered Medications:     lactated ringers infusion, , Intravenous, Continuous, Frantz Coffman, P.A., Stopped at 02/02/23 0213    enoxaparin (Lovenox) inj 40 mg, 40 mg, Subcutaneous, DAILY, Frantz Coffman, P.A.    Pharmacy Consult Request ...Pain Management Review 1 Each, 1 Each, Other, PHARMACY TO DOSE, Frantz Coffman, P.A.    ondansetron (ZOFRAN) syringe/vial injection 4 mg, 4 mg, Intravenous, Q4HRS PRN, Frantz Coffman, P.A.    diphenhydrAMINE (BENADRYL) injection 25 mg, 25 mg, Intravenous, Q6HRS PRN, Frantz Coffman, P.A.    diphenhydrAMINE (BENADRYL) tablet/capsule 25 mg, 25 mg, Oral, Q6HRS PRN **OR** diphenhydrAMINE (BENADRYL) injection 25 mg, 25 mg, Intravenous, Q6HRS PRN, Frantz Coffman, P.A.    menthol (Halls) lozenge 1 Lozenge, 1 Lozenge, Oral, Q4HRS PRN, Frantz Coffman, P.A.    ketorolac (TORADOL) injection 30 mg, 30 mg, Intravenous, Q6HRS PRN, Frantz Coffman PDEE.    HYDROmorphone (DILAUDID) 0.2 mg/mL in 50 mL NS (PCA), , Intravenous, Continuous, RENAE Perry., Rate Verify at 02/02/23 0657    oxyCODONE immediate-release (ROXICODONE) tablet 5 mg, 5 mg, Oral, Q4HRS PRN, Frantz VOSS  RENAE Coffman.    methocarbamol (ROBAXIN) 1,000 mg in  mL IVPB, 1,000 mg, Intravenous, Q8HRS PRN, SHAHLA PerryA., Stopped at 02/01/23 1442    enalaprilat (Vasotec) injection 2.5 mg 2 mL, 2.5 mg, Intravenous, Q6HRS PRN, Frantz Coffman P.A.    famotidine (PEPCID) tablet 20 mg, 20 mg, Oral, BID **OR** famotidine (PEPCID) injection 20 mg, 20 mg, Intravenous, BID, Frantz Coffman, P.A., 20 mg at 02/02/23 0432    hydrALAZINE (APRESOLINE) injection 10 mg, 10 mg, Intravenous, Q6HRS PRN, Frantz Coffman P.A.    Current Facility-Administered Medications:     lactated ringers infusion, , Intravenous, Continuous, RENAE Perry., Stopped at 02/02/23 0213    enoxaparin (Lovenox) inj 40 mg, 40 mg, Subcutaneous, DAILY, Frantz Coffman P.A.    Pharmacy Consult Request ...Pain Management Review 1 Each, 1 Each, Other, PHARMACY TO DOSE, Frantz Coffman, P.A.    ondansetron (ZOFRAN) syringe/vial injection 4 mg, 4 mg, Intravenous, Q4HRS PRN, Frantz Coffman, P.NORMA.    diphenhydrAMINE (BENADRYL) injection 25 mg, 25 mg, Intravenous, Q6HRS PRN, Frantz Coffman, P.A.    diphenhydrAMINE (BENADRYL) tablet/capsule 25 mg, 25 mg, Oral, Q6HRS PRN **OR** diphenhydrAMINE (BENADRYL) injection 25 mg, 25 mg, Intravenous, Q6HRS PRN, RENAE Perry.    menthol (Halls) lozenge 1 Lozenge, 1 Lozenge, Oral, Q4HRS PRN, Frantz Coffman, P.A.    ketorolac (TORADOL) injection 30 mg, 30 mg, Intravenous, Q6HRS PRN, Frantz Coffman P.A.    HYDROmorphone (DILAUDID) 0.2 mg/mL in 50 mL NS (PCA), , Intravenous, Continuous, Frantz Coffman, P.A., Rate Verify at 02/02/23 0657    oxyCODONE immediate-release (ROXICODONE) tablet 5 mg, 5 mg, Oral, Q4HRS PRN, Frantz Coffman, P.A.    methocarbamol (ROBAXIN) 1,000 mg in  mL IVPB, 1,000 mg, Intravenous, Q8HRS PRN, Frantz Coffman, P.A., Stopped at 02/01/23 1442    enalaprilat (Vasotec) injection 2.5 mg 2 mL, 2.5 mg, Intravenous, Q6HRS PRN, Frantz Coffman, P.A.    famotidine (PEPCID) tablet 20 mg, 20 mg, Oral,  BID **OR** famotidine (PEPCID) injection 20 mg, 20 mg, Intravenous, BID, Frantz Coffman, P.A., 20 mg at 02/02/23 0432    hydrALAZINE (APRESOLINE) injection 10 mg, 10 mg, Intravenous, Q6HRS PRN, Frantz Coffman, P.A.    ASSESSMENT/PLAN  Babatunde Sullivan is a 57 y.o. male s/p right thoracotomy, middle and lower lobe lobectomy for adenocarcinoma of the lung POD #1.  - CXR negative for pleural effusion, consolidation, PNX, and cardiopulmonary disease.  - Repeat CBC, CMP, GFR in qd to monitor patient status.  - Pain controlled with PCA. Continue pain protocol.  - N/V medications available prn.  - Continue maintenance fluids.  - Regular diet. Encourage frequent hydration.  - Encourage OOB ambulation when possible. Advised against heavy lifting and strenuous physical activity.  - Wean O2 as tolerated.    PROPHYLAXIS  DVT: Enoxaparin (Lovenox), SCDs      Medical Student: Inocencio Jernigan, MS3  Attending: Dr. John Ganser, MD

## 2023-02-02 NOTE — PROGRESS NOTES
"Progress Note:    S: Doing well  Pain controlled with PCA  Using IS  Walked last night    O:  Recent Labs     01/31/23  1018 02/02/23  0050   WBC 6.6 11.7*   RBC 4.07* 3.43*   HEMOGLOBIN 14.4 12.3*   HEMATOCRIT 42.7 37.1*   .9* 108.2*   MCH 35.4* 35.9*   MCHC 33.7 33.2*   RDW 47.8 47.8   PLATELETCT 171 150*   MPV 10.2 9.8     Recent Labs     01/31/23  1018 02/02/23  0050   SODIUM 140 134*   POTASSIUM 3.7 4.5   CHLORIDE 100 100   CO2 31 22   GLUCOSE 95 172*   BUN 10 19   CREATININE 0.78 0.82   CALCIUM 9.8 8.6         Current Facility-Administered Medications   Medication Dose    lactated ringers infusion      enoxaparin (Lovenox) inj 40 mg  40 mg    Pharmacy Consult Request ...Pain Management Review 1 Each  1 Each    ondansetron (ZOFRAN) syringe/vial injection 4 mg  4 mg    diphenhydrAMINE (BENADRYL) injection 25 mg  25 mg    diphenhydrAMINE (BENADRYL) tablet/capsule 25 mg  25 mg    Or    diphenhydrAMINE (BENADRYL) injection 25 mg  25 mg    menthol (Halls) lozenge 1 Lozenge  1 Lozenge    ketorolac (TORADOL) injection 30 mg  30 mg    HYDROmorphone (DILAUDID) 0.2 mg/mL in 50 mL NS (PCA)      oxyCODONE immediate-release (ROXICODONE) tablet 5 mg  5 mg    methocarbamol (ROBAXIN) 1,000 mg in  mL IVPB  1,000 mg    enalaprilat (Vasotec) injection 2.5 mg 2 mL  2.5 mg    famotidine (PEPCID) tablet 20 mg  20 mg    Or    famotidine (PEPCID) injection 20 mg  20 mg    hydrALAZINE (APRESOLINE) injection 10 mg  10 mg       PE:  /79   Pulse 65   Temp 36.4 °C (97.5 °F) (Temporal)   Resp 20   Ht 1.93 m (6' 4\")   Wt (!) 128 kg (281 lb 4.9 oz)   SpO2 96%     Intake/Output Summary (Last 24 hours) at 2/2/2023 0900  Last data filed at 2/2/2023 0835  Gross per 24 hour   Intake 3576.26 ml   Output 2220 ml   Net 1356.26 ml       Chest tube: 500 ml serous  Heart regular    Rads:  DX-CHEST-PORTABLE (1 VIEW)   Final Result         1.  No acute cardiopulmonary disease.      DX-CHEST-LIMITED (1 VIEW)   Final Result      1.  " Right chest tube. No pneumothorax.   2.  No focal consolidation or pleural effusions.          A:   Active Hospital Problems    Diagnosis     Adenocarcinoma, lung (HCC) [C34.90]          P: Ambulate/IS  Stop IVF  Await path    John Ganser M.D.  [facility]

## 2023-02-02 NOTE — DISCHARGE PLANNING
Care Transition Team Assessment    CM spoke with patient and spouse at bedside regarding DCP. Patient states that he lives with his spouse in a single level home with a ramp. Patient states that he was independent with everything until Oct of 2022 when he started Chemo. Patient states that he has been very weak. Patient states that this is a planned admission past his chemo regime. Patient states that he doesn't know if he will have needs past this admission but if not his spouse helps him at home. CM verified patient's demographics and insurance. CM will continue to follow for all discharge needs.       Information Source  Orientation Level: Oriented X4  Information Given By: Patient, Spouse  Who is responsible for making decisions for patient? : Patient    Readmission Evaluation  Is this a readmission?: Yes - planned readmission    Elopement Risk  Legal Hold: No  Ambulatory or Self Mobile in Wheelchair: Yes  Disoriented: No  Psychiatric Symptoms: None  History of Wandering: No  Elopement this Admit: No  Vocalizing Wanting to Leave: No  Displays Behaviors, Body Language Wanting to Leave: No-Not at Risk for Elopement  Elopement Risk: Not at Risk for Elopement    Interdisciplinary Discharge Planning  Lives with - Patient's Self Care Capacity: Spouse  Patient or legal guardian wants to designate a caregiver: No  Support Systems: Spouse / Significant Other  Housing / Facility: 1 Story House  Durable Medical Equipment: Not Applicable    Discharge Preparedness  What is your plan after discharge?: Home with help  What are your discharge supports?: Spouse  Prior Functional Level: Ambulatory  Difficulity with ADLs: Other (Patient states that since Oct when he started his Chemo he has been much weaker. Before that he was very independent.)    Functional Assesment  Prior Functional Level: Ambulatory    Finances  Financial Barriers to Discharge: No  Prescription Coverage: Yes    Vision / Hearing Impairment  Right Eye Vision:  Impaired, Wears Glasses  Left Eye Vision: Impaired, Wears Glasses  Hearing Impairment : No         Advance Directive  Advance Directive?: None  Advance Directive offered?: AD Booklet refused    Domestic Abuse  Have you ever been the victim of abuse or violence?: No  Physical Abuse or Sexual Abuse: No  Verbal Abuse or Emotional Abuse: No  Possible Abuse/Neglect Reported to:: Not Applicable    Psychological Assessment  History of Substance Abuse: None  History of Psychiatric Problems: No  Non-compliant with Treatment: No  Newly Diagnosed Illness: No    Discharge Risks or Barriers  Discharge risks or barriers?: No    Anticipated Discharge Information  Discharge Disposition: D/T to home under A care in anticipation of covered skilled care (06)

## 2023-02-03 PROCEDURE — 770001 HCHG ROOM/CARE - MED/SURG/GYN PRIV*

## 2023-02-03 PROCEDURE — 700105 HCHG RX REV CODE 258: Performed by: PHYSICIAN ASSISTANT

## 2023-02-03 PROCEDURE — 700111 HCHG RX REV CODE 636 W/ 250 OVERRIDE (IP): Performed by: PHYSICIAN ASSISTANT

## 2023-02-03 PROCEDURE — 700102 HCHG RX REV CODE 250 W/ 637 OVERRIDE(OP): Performed by: PHYSICIAN ASSISTANT

## 2023-02-03 PROCEDURE — A9270 NON-COVERED ITEM OR SERVICE: HCPCS | Performed by: PHYSICIAN ASSISTANT

## 2023-02-03 RX ADMIN — ENOXAPARIN SODIUM 40 MG: 40 INJECTION SUBCUTANEOUS at 04:27

## 2023-02-03 RX ADMIN — FAMOTIDINE 20 MG: 10 INJECTION, SOLUTION INTRAVENOUS at 04:27

## 2023-02-03 RX ADMIN — FAMOTIDINE 20 MG: 20 TABLET, FILM COATED ORAL at 16:47

## 2023-02-03 RX ADMIN — HYDROMORPHONE HYDROCHLORIDE: 10 INJECTION, SOLUTION INTRAMUSCULAR; INTRAVENOUS; SUBCUTANEOUS at 18:49

## 2023-02-03 ASSESSMENT — PAIN DESCRIPTION - PAIN TYPE
TYPE: ACUTE PAIN

## 2023-02-03 NOTE — CARE PLAN
The patient is Stable - Low risk of patient condition declining or worsening    Shift Goals  Clinical Goals: pulmonary hygiene; pain control  Patient Goals: comfort; pain control    Progress made toward(s) clinical / shift goals:  patient continuing to use the incentive spirometer + ambulate. Pain controlled with PRN's and PCA.    Patient is not progressing towards the following goals:

## 2023-02-03 NOTE — PROGRESS NOTES
Assumed care of patient at 0645. Bedside report received. Assessment complete.  AA&Ox4. Denies CP/SOB.  R Chest Tube to -20mmHg Suction, Patent, No Air Leak Noted   Reporting 4/10 pain.PCA Pump in use for Pain Management  Educated patient regarding pharmacologic and non pharmacologic modalities for pain management.  Skin per flowsheets  Tolerating regular diet. Denies N/V.  + void. Last BM PTA   Pt ambulates SBA.  All needs met at this time. Call light within reach. Pt calls appropriately. Bed low and locked, non skid socks in place. Hourly rounding in place.

## 2023-02-03 NOTE — PROGRESS NOTES
"Progress Note:    S: Doing well  Pain controlled  Ambulating/IS    O:  Recent Labs     01/31/23  1018 02/02/23  0050   WBC 6.6 11.7*   RBC 4.07* 3.43*   HEMOGLOBIN 14.4 12.3*   HEMATOCRIT 42.7 37.1*   .9* 108.2*   MCH 35.4* 35.9*   MCHC 33.7 33.2*   RDW 47.8 47.8   PLATELETCT 171 150*   MPV 10.2 9.8     Recent Labs     01/31/23  1018 02/02/23  0050   SODIUM 140 134*   POTASSIUM 3.7 4.5   CHLORIDE 100 100   CO2 31 22   GLUCOSE 95 172*   BUN 10 19   CREATININE 0.78 0.82   CALCIUM 9.8 8.6         Current Facility-Administered Medications   Medication Dose    lactated ringers infusion      enoxaparin (Lovenox) inj 40 mg  40 mg    Pharmacy Consult Request ...Pain Management Review 1 Each  1 Each    ondansetron (ZOFRAN) syringe/vial injection 4 mg  4 mg    diphenhydrAMINE (BENADRYL) injection 25 mg  25 mg    diphenhydrAMINE (BENADRYL) tablet/capsule 25 mg  25 mg    Or    diphenhydrAMINE (BENADRYL) injection 25 mg  25 mg    menthol (Halls) lozenge 1 Lozenge  1 Lozenge    ketorolac (TORADOL) injection 30 mg  30 mg    HYDROmorphone (DILAUDID) 0.2 mg/mL in 50 mL NS (PCA)      oxyCODONE immediate-release (ROXICODONE) tablet 5 mg  5 mg    methocarbamol (ROBAXIN) 1,000 mg in  mL IVPB  1,000 mg    enalaprilat (Vasotec) injection 2.5 mg 2 mL  2.5 mg    famotidine (PEPCID) tablet 20 mg  20 mg    Or    famotidine (PEPCID) injection 20 mg  20 mg    hydrALAZINE (APRESOLINE) injection 10 mg  10 mg       PE:  BP (!) 144/81   Pulse 94   Temp 37.1 °C (98.8 °F) (Temporal)   Resp 17   Ht 1.93 m (6' 4\")   Wt (!) 128 kg (281 lb 4.9 oz)   SpO2 94%     Intake/Output Summary (Last 24 hours) at 2/3/2023 0918  Last data filed at 2/3/2023 0824  Gross per 24 hour   Intake 1727.15 ml   Output 3065 ml   Net -1337.85 ml       Chest tube: No air leak, good resp variation, no air leak    Rads:  DX-CHEST-PORTABLE (1 VIEW)   Final Result         1.  No acute cardiopulmonary disease.      DX-CHEST-LIMITED (1 VIEW)   Final Result      1.  " Right chest tube. No pneumothorax.   2.  No focal consolidation or pleural effusions.          A:   Active Hospital Problems    Diagnosis     Adenocarcinoma, lung (HCC) [C34.90]          P: Discussed path  Cont PCA and chest tube 1 more day    John Ganser M.D.  [facility]

## 2023-02-03 NOTE — CARE PLAN
Problem: Pain - Standard  Goal: Alleviation of pain or a reduction in pain to the patient’s comfort goal  Outcome: Progressing     Problem: Knowledge Deficit - Standard  Goal: Patient and family/care givers will demonstrate understanding of plan of care, disease process/condition, diagnostic tests and medications  Outcome: Progressing   The patient is Stable - Low risk of patient condition declining or worsening    Shift Goals  Clinical Goals: monitor chest tube, pain control  Patient Goals: rest, pain control    Progress made toward(s) clinical / shift goals:  pts pain managed with PCA pump.  POC discussed with pt, pt verbalized understanding of plan.

## 2023-02-03 NOTE — PROGRESS NOTES
Report received from [place] GODOY, assumed care at 1900  A0x4  Pt declines any SOB on 2L NC, chest pain, new onset of numbness/tingling  Pt rates pain at 4/10, on a scale of 1-10, pt medicated with PCA pump   + voiding   Pt has + flatus, + bowel sounds,  BM PTA  Pt ambulates with a standby assist and FWW  Pt is tolerating a regular diet, pt denies any nausea/vomiting  Right chest tube to -20cm suction, no air leak noted. Right back dressing C/D/I  Plan of care discussed, all questions answered.Call light is within reach, treaded slipper socks on, bed in lowest/locked position, hourly rounding in place, all needs met at this time.

## 2023-02-03 NOTE — CARE PLAN
Problem: Pain - Standard  Goal: Alleviation of pain or a reduction in pain to the patient’s comfort goal  Outcome: Progressing     Problem: Knowledge Deficit - Standard  Goal: Patient and family/care givers will demonstrate understanding of plan of care, disease process/condition, diagnostic tests and medications  Outcome: Progressing   The patient is Stable - Low risk of patient condition declining or worsening    Shift Goals  Clinical Goals: Monitor Chest Tube  Patient Goals: Comfort  Family Goals: Communication    Progress made toward(s) clinical / shift goals:  Chest Tube Patent, To -20mmHg Suction, Educated patient on plan of care this shift, Pain medicated per MAR     Patient is not progressing towards the following goals:

## 2023-02-04 PROCEDURE — 770001 HCHG ROOM/CARE - MED/SURG/GYN PRIV*

## 2023-02-04 PROCEDURE — 700102 HCHG RX REV CODE 250 W/ 637 OVERRIDE(OP): Performed by: PHYSICIAN ASSISTANT

## 2023-02-04 PROCEDURE — 700111 HCHG RX REV CODE 636 W/ 250 OVERRIDE (IP): Performed by: PHYSICIAN ASSISTANT

## 2023-02-04 PROCEDURE — A9270 NON-COVERED ITEM OR SERVICE: HCPCS | Performed by: SURGERY

## 2023-02-04 PROCEDURE — C1729 CATH, DRAINAGE: HCPCS | Performed by: SURGERY

## 2023-02-04 PROCEDURE — RXMED WILLOW AMBULATORY MEDICATION CHARGE: Performed by: SURGERY

## 2023-02-04 PROCEDURE — A9270 NON-COVERED ITEM OR SERVICE: HCPCS | Performed by: PHYSICIAN ASSISTANT

## 2023-02-04 PROCEDURE — 700102 HCHG RX REV CODE 250 W/ 637 OVERRIDE(OP): Performed by: SURGERY

## 2023-02-04 RX ORDER — OXYCODONE HYDROCHLORIDE 5 MG/1
5-10 TABLET ORAL
Qty: 40 TABLET | Refills: 0 | Status: SHIPPED | OUTPATIENT
Start: 2023-02-04 | End: 2023-02-08

## 2023-02-04 RX ORDER — OXYCODONE HYDROCHLORIDE 5 MG/1
5-10 TABLET ORAL
Status: DISCONTINUED | OUTPATIENT
Start: 2023-02-04 | End: 2023-02-05 | Stop reason: HOSPADM

## 2023-02-04 RX ORDER — ACETAMINOPHEN 325 MG/1
650 TABLET ORAL EVERY 4 HOURS PRN
Status: DISCONTINUED | OUTPATIENT
Start: 2023-02-04 | End: 2023-02-05 | Stop reason: HOSPADM

## 2023-02-04 RX ADMIN — ENOXAPARIN SODIUM 40 MG: 40 INJECTION SUBCUTANEOUS at 04:57

## 2023-02-04 RX ADMIN — OXYCODONE HYDROCHLORIDE 10 MG: 5 TABLET ORAL at 18:12

## 2023-02-04 RX ADMIN — OXYCODONE HYDROCHLORIDE 5 MG: 5 TABLET ORAL at 23:45

## 2023-02-04 RX ADMIN — FAMOTIDINE 20 MG: 20 TABLET, FILM COATED ORAL at 04:57

## 2023-02-04 RX ADMIN — ACETAMINOPHEN 650 MG: 325 TABLET, FILM COATED ORAL at 14:01

## 2023-02-04 RX ADMIN — FAMOTIDINE 20 MG: 20 TABLET, FILM COATED ORAL at 16:37

## 2023-02-04 ASSESSMENT — PAIN DESCRIPTION - PAIN TYPE
TYPE: ACUTE PAIN

## 2023-02-04 NOTE — PROGRESS NOTES
Report received from [place] GODOY, assumed care at 1900  A0x4  Pt declines any SOB on 2L NC, chest pain, new onset of numbness/tingling  Pt rates pain at 3/10, on a scale of 1-10, pt medicated with PCA pump   + voiding   Pt has + flatus, + bowel sounds,  BM PTA  Pt ambulates with a standby assist and FWW  Pt is tolerating a regular diet, pt denies any nausea/vomiting  Right chest tube to -20cm suction, no air leak noted. Right back dressing C/D/I  Plan of care discussed, all questions answered.Call light is within reach, treaded slipper socks on, bed in lowest/locked position, hourly rounding in place, all needs met at this time.

## 2023-02-04 NOTE — DISCHARGE PLANNING
"Case Management Discharge Planning    Admission Date: 2/1/2023  GMLOS: 2.3  ALOS: 3    6-Clicks ADL Score: 21  6-Clicks Mobility Score: 18      Anticipated Discharge Dispo: Discharge Disposition: Discharged to home/self care (01) with close OP follow up    DME Needed: No    Action(s) Taken: Updated Provider/Nurse on Discharge Plan    Per Chart : \" Chest tube removed  Discharge tomorrow\"        Escalations Completed: None    Medically Clear: Yes    Next Steps:   This RN CM to continue to assist Pt with discharge as needed    Barriers to Discharge: None    Is the patient up for discharge tomorrow: Yes    Is transport arranged for discharge disposition: No        "

## 2023-02-04 NOTE — PROGRESS NOTES
Assumed care of patient at 0645. Bedside report received. Assessment complete.  AA&Ox4. Denies CP/SOB.  Reporting 3/10 pain. Bolus Button in place for pain Management   R Chest tube to -20mmHg Suction, Patent, No Air leak noted   Educated patient regarding pharmacologic and non pharmacologic modalities for pain management.  Skin per flowsheets  Tolerating regular diet. Denies N/V.  + void. Last BM 2/3  Pt ambulates SBA.  All needs met at this time. Call light within reach. Pt calls appropriately. Bed low and locked, non skid socks in place. Hourly rounding in place.

## 2023-02-04 NOTE — PROGRESS NOTES
"Progress Note:    S: Doing well  Had BM  Pain controlled    O:  Recent Labs     02/02/23  0050   WBC 11.7*   RBC 3.43*   HEMOGLOBIN 12.3*   HEMATOCRIT 37.1*   .2*   MCH 35.9*   MCHC 33.2*   RDW 47.8   PLATELETCT 150*   MPV 9.8     Recent Labs     02/02/23  0050   SODIUM 134*   POTASSIUM 4.5   CHLORIDE 100   CO2 22   GLUCOSE 172*   BUN 19   CREATININE 0.82   CALCIUM 8.6         Current Facility-Administered Medications   Medication Dose    oxyCODONE immediate-release (ROXICODONE) tablet 5-10 mg  5-10 mg    acetaminophen (Tylenol) tablet 650 mg  650 mg    enoxaparin (Lovenox) inj 40 mg  40 mg    Pharmacy Consult Request ...Pain Management Review 1 Each  1 Each    ondansetron (ZOFRAN) syringe/vial injection 4 mg  4 mg    diphenhydrAMINE (BENADRYL) injection 25 mg  25 mg    diphenhydrAMINE (BENADRYL) tablet/capsule 25 mg  25 mg    Or    diphenhydrAMINE (BENADRYL) injection 25 mg  25 mg    menthol (Halls) lozenge 1 Lozenge  1 Lozenge    ketorolac (TORADOL) injection 30 mg  30 mg    methocarbamol (ROBAXIN) 1,000 mg in  mL IVPB  1,000 mg    enalaprilat (Vasotec) injection 2.5 mg 2 mL  2.5 mg    famotidine (PEPCID) tablet 20 mg  20 mg    hydrALAZINE (APRESOLINE) injection 10 mg  10 mg       PE:  /60   Pulse (!) 53   Temp 37.4 °C (99.3 °F) (Temporal)   Resp 17   Ht 1.93 m (6' 4\")   Wt (!) 128 kg (281 lb 4.9 oz)   SpO2 93%     Intake/Output Summary (Last 24 hours) at 2/4/2023 1049  Last data filed at 2/4/2023 0800  Gross per 24 hour   Intake 754.15 ml   Output 1695 ml   Net -940.85 ml       Chest tube: Minimal output, no air leak    Rads:  DX-CHEST-PORTABLE (1 VIEW)   Final Result         1.  No acute cardiopulmonary disease.      DX-CHEST-LIMITED (1 VIEW)   Final Result      1.  Right chest tube. No pneumothorax.   2.  No focal consolidation or pleural effusions.          A:   Active Hospital Problems    Diagnosis     Adenocarcinoma, lung (HCC) [C34.90]          P: Chest tube removed  Discharge " tomorrow    John Ganser M.D.  [facility]

## 2023-02-04 NOTE — CARE PLAN
Problem: Pain - Standard  Goal: Alleviation of pain or a reduction in pain to the patient’s comfort goal  Outcome: Progressing     Problem: Knowledge Deficit - Standard  Goal: Patient and family/care givers will demonstrate understanding of plan of care, disease process/condition, diagnostic tests and medications  Outcome: Progressing   The patient is Stable - Low risk of patient condition declining or worsening    Shift Goals  Clinical Goals: Pain Control  Patient Goals: Monitor Chest Tube  Family Goals: Communication    Progress made toward(s) clinical / shift goals:  Pain medicated per MAR, Educated patient on plan of care this shift     Patient is not progressing towards the following goals:

## 2023-02-04 NOTE — CARE PLAN
Problem: Pain - Standard  Goal: Alleviation of pain or a reduction in pain to the patient’s comfort goal  Outcome: Progressing     Problem: Knowledge Deficit - Standard  Goal: Patient and family/care givers will demonstrate understanding of plan of care, disease process/condition, diagnostic tests and medications  Outcome: Progressing   The patient is Stable - Low risk of patient condition declining or worsening    Shift Goals  Clinical Goals: pain control, monitor chest tube  Patient Goals: rest  Family Goals: Communication    Progress made toward(s) clinical / shift goals:  pts pain managed with PCA pump.  POC discussed with pt, pt verbalized understanding of plan.

## 2023-02-05 ENCOUNTER — PHARMACY VISIT (OUTPATIENT)
Dept: PHARMACY | Facility: MEDICAL CENTER | Age: 58
End: 2023-02-05
Payer: COMMERCIAL

## 2023-02-05 VITALS
WEIGHT: 281.31 LBS | RESPIRATION RATE: 17 BRPM | OXYGEN SATURATION: 90 % | BODY MASS INDEX: 34.26 KG/M2 | SYSTOLIC BLOOD PRESSURE: 129 MMHG | HEART RATE: 85 BPM | TEMPERATURE: 97.2 F | HEIGHT: 76 IN | DIASTOLIC BLOOD PRESSURE: 73 MMHG

## 2023-02-05 PROCEDURE — A9270 NON-COVERED ITEM OR SERVICE: HCPCS | Performed by: SURGERY

## 2023-02-05 PROCEDURE — 700111 HCHG RX REV CODE 636 W/ 250 OVERRIDE (IP): Performed by: PHYSICIAN ASSISTANT

## 2023-02-05 PROCEDURE — 700102 HCHG RX REV CODE 250 W/ 637 OVERRIDE(OP): Performed by: SURGERY

## 2023-02-05 PROCEDURE — 700102 HCHG RX REV CODE 250 W/ 637 OVERRIDE(OP): Performed by: PHYSICIAN ASSISTANT

## 2023-02-05 PROCEDURE — A9270 NON-COVERED ITEM OR SERVICE: HCPCS | Performed by: PHYSICIAN ASSISTANT

## 2023-02-05 RX ADMIN — FAMOTIDINE 20 MG: 20 TABLET, FILM COATED ORAL at 04:54

## 2023-02-05 RX ADMIN — ACETAMINOPHEN 650 MG: 325 TABLET, FILM COATED ORAL at 03:50

## 2023-02-05 RX ADMIN — ENOXAPARIN SODIUM 40 MG: 40 INJECTION SUBCUTANEOUS at 04:54

## 2023-02-05 RX ADMIN — OXYCODONE HYDROCHLORIDE 5 MG: 5 TABLET ORAL at 10:51

## 2023-02-05 ASSESSMENT — PAIN DESCRIPTION - PAIN TYPE
TYPE: ACUTE PAIN

## 2023-02-05 NOTE — PROGRESS NOTES
Received report from day shift RN. Assumed patient care.    Patient is A+O x4.  Tolerating regular diet. Denies N/V.  Denies chest pain or SOB.  Pain 3 on a 0-10 pain scale.   + void, - BM. Last BM PTA.  Patient ambulates with stand by assistance.     Plan of care discussed, all questions answered. Educated on the importance of calling before getting OOB and pt verbalizes understanding. Educated regarding importance of oral care. Oral care kit at bedside. Call light is within reach, treaded slipper socks on, bed in lowest/ locked position, hourly rounding in place, all needs met at this time

## 2023-02-05 NOTE — PROGRESS NOTES
Assumed care of patient at 0645. Bedside report received. Assessment complete.  AA&Ox4. Denies CP/SOB.  Reporting 0/10 pain. Declined intervention at this time.  Educated patient regarding pharmacologic and non pharmacologic modalities for pain management.  Skin per flowsheets  Tolerating regular diet. Denies N/V.  + void. Last BM PTA   Pt ambulates SBA w FWW.  All needs met at this time. Call light within reach. Pt calls appropriately. Bed low and locked, non skid socks in place. Hourly rounding in place.

## 2023-02-05 NOTE — DISCHARGE INSTRUCTIONS
Discharge Instructions    Discharged to home by car with relative. Discharged via wheelchair, hospital escort: Yes.  Special equipment needed: Not Applicable    Be sure to schedule a follow-up appointment with your primary care doctor or any specialists as instructed.     Discharge Plan:   Influenza Vaccine Indication: Patient Refuses    I understand that a diet low in cholesterol, fat, and sodium is recommended for good health. Unless I have been given specific instructions below for another diet, I accept this instruction as my diet prescription.   Other diet:     Special Instructions: None    -Is this patient being discharged with medication to prevent blood clots?  No    Is patient discharged on Warfarin / Coumadin?   No

## 2023-02-05 NOTE — PROGRESS NOTES
Discharging Patient home per physician order.  Discharged with Spouse.  Demonstrated understanding of discharge instructions, follow up appointments, home medications, prescriptions, home care for surgical wound  Ambulating without assistance, voiding without difficulty, pain well controlled, tolerating oral medications, oxygen saturation greater than 90% , tolerating diet. Educational handouts given and discussed.  Verbalized understanding of discharge instructions and educational handouts.  Oxycodone Prescription given to Patient at time of Discharge. Stated several reasons why to return to ED or seek medical attention. All questions answered.  Belongings and dressing supplies with patient at time of discharge.

## 2023-02-05 NOTE — CARE PLAN
The patient is Stable - Low risk of patient condition declining or worsening    Shift Goals  Clinical Goals: Safety, pain control, monitor old chest tube site, Rest  Patient Goals: Pain control, sleep  Family Goals: No family present    Problem: Pain - Standard  Goal: Alleviation of pain or a reduction in pain to the patient’s comfort goal  Outcome: Progressing     Problem: Knowledge Deficit - Standard  Goal: Patient and family/care givers will demonstrate understanding of plan of care, disease process/condition, diagnostic tests and medications  Outcome: Progressing     Progress made toward(s) clinical / shift goals:  Patient verbalized understanding of plan of care. Patient's pain is managed with current pain medications per MAR. Patient verbalizes understanding to call for assistance before getting out of bed.    Patient is not progressing towards the following goals:

## 2023-02-05 NOTE — CARE PLAN
Problem: Pain - Standard  Goal: Alleviation of pain or a reduction in pain to the patient’s comfort goal  Outcome: Progressing     Problem: Knowledge Deficit - Standard  Goal: Patient and family/care givers will demonstrate understanding of plan of care, disease process/condition, diagnostic tests and medications  Outcome: Progressing   The patient is Stable - Low risk of patient condition declining or worsening    Shift Goals  Clinical Goals: Discharge  Patient Goals: Discharge  Family Goals: N/A    Progress made toward(s) clinical / shift goals:  Educated patient on plan of care this shift, Patient to discharge today       Patient is not progressing towards the following goals:

## 2023-02-07 NOTE — DISCHARGE SUMMARY
Discharge Summary    CHIEF COMPLAINT ON ADMISSION  Lung cancer      Reason for Admission  Malignant neoplasm of right middle lobe    Admission Date  2/1/2023    CODE STATUS  Prior    HPI & HOSPITAL COURSE  This is a 57 y.o. male here with right middle lobe lung cancer post neoadjuvant chemotherapy with good clinical response. He underwent a right thoracotomy with middle and lower lobectomy with lymph node dissection with clear margins, hilar nodes positive, mediastinal nodes negative. No complications post op.      Therefore, he is discharged in good and stable condition to home with close outpatient follow-up.    The patient met 2-midnight criteria for an inpatient stay at the time of discharge.    Discharge Date  2/5/2023    FOLLOW UP ITEMS POST DISCHARGE  None    DISCHARGE DIAGNOSES  Active Problems:    Adenocarcinoma, lung (HCC) POA: Yes  Resolved Problems:    * No resolved hospital problems. *      FOLLOW UP  Future Appointments   Date Time Provider Department Center   2/21/2023 11:30 AM Clementine Howe M.D. [facility] None   5/8/2023  9:30 AM [address]   5/15/2023 11:30 AM Clementine Howe M.D. [facility] None     Yovany Packer M.D.  [address] [place] [id number]  [phone]            MEDICATIONS ON DISCHARGE     Medication List        START taking these medications        Instructions   oxyCODONE immediate-release 5 MG Tabs  Commonly known as: ROXICODONE   Take 1-2 Tablets by mouth every 3 hours as needed for Severe Pain for up to 3 days.  Dose: 5-10 mg            CONTINUE taking these medications        Instructions   acetaminophen 500 MG Tabs  Commonly known as: TYLENOL   Take 500-1,000 mg by mouth every 6 hours as needed.  Dose: 500-1,000 mg     benzonatate 100 MG Caps  Commonly known as: TESSALON   Take 1 Capsule by mouth 3 times a day as needed for Cough.  Dose: 100 mg     folic acid 1 MG Tabs  Commonly known as: FOLVITE   Take 1 Tablet by mouth every day for 105  days. Start 5-7 days prior to first cycle of pemetrexed  Dose: 1 mg     hydroCHLOROthiazide 50 MG Tabs  Commonly known as: HYDRODIURIL   Take 75 mg by mouth every day.  Dose: 75 mg     Klor-Con M20 20 MEQ Tbcr  Generic drug: potassium chloride SA   Take 20 mEq by mouth 3 times a day.  Dose: 20 mEq     MULTIVITAMINS PO   Take 1 Tablet by mouth every day.  Dose: 1 Tablet     PSYLLIUM PO   Take 5 Capsules by mouth 1 time a day as needed.  Dose: 5 Capsule     ZINC EXTRA STRENGTH PO   Take 1 Tablet by mouth every day.  Dose: 1 Tablet              Allergies  Allergies   Allergen Reactions    Mefoxin Swelling     .       DIET  No orders of the defined types were placed in this encounter.      ACTIVITY  As tolerated.  Weight bearing as tolerated    CONSULTATIONS  None    PROCEDURES  Right thoracotomy, middle and lower lobectomy    LABORATORY  Lab Results   Component Value Date    SODIUM 134 (L) 02/02/2023    POTASSIUM 4.5 02/02/2023    CHLORIDE 100 02/02/2023    CO2 22 02/02/2023    GLUCOSE 172 (H) 02/02/2023    BUN 19 02/02/2023    CREATININE 0.82 02/02/2023        Lab Results   Component Value Date    WBC 11.7 (H) 02/02/2023    HEMOGLOBIN 12.3 (L) 02/02/2023    HEMATOCRIT 37.1 (L) 02/02/2023    PLATELETCT 150 (L) 02/02/2023

## 2023-02-08 NOTE — DOCUMENTATION QUERY
[facility]                                                                       Query Response Note      PATIENT:               RONDA ELMORE  ACCT #:                  [id number]  MRN:                     [id number]  :                      1965  ADMIT DATE:       2023 7:34 AM  DISCH DATE:        2023 8:45 PM  RESPONDING  PROVIDER #:        [id number]           QUERY TEXT:    The finding of 2 of 15 lymph nodes positive for metastatic carcinoma is documented in the pathology report. Also in the path report it shows A thru E all of those lymph nodes are negative for carcinoma. D/C summary says hilar nodes positive.    Per coding guidelines, coders cannot code diagnosis from the pathology report without the Attending Physician's documentation of the diagnosis. Based on clinical findings, risk factors and treatment, can this diagnosis be further specified?    NOTE:  If an appropriate response is not listed below, please respond with a new note.        The patient's Clinical Indicators include:  Clinical indicators:  enlarged lymph nodes    Treatment and monitoring:  Lymphandectomy with resection of RT middle lobe and lower lobe of lung on 2023    Risk factors:  Previous history of testicular and kidney cancer    [email or web address]  Options provided:   -- Agree with pathology finding of 2 of 15 lymph nodes positive for metastatic carcinoma   -- Disagree with pathology finding of 2 of 15 lymph nodes positive for metastatic carcinoma   -- Unable to determine      Query created by: Louise Glaser on 2023 12:29 PM    RESPONSE TEXT:    Agree with pathology finding of 2 of 15 lymph nodes positive for metastatic carcinoma          Electronically signed by:  JOHN H GANSER MD 2023 12:30 PM

## 2023-02-21 ENCOUNTER — APPOINTMENT (OUTPATIENT)
Dept: HEMATOLOGY ONCOLOGY | Facility: MEDICAL CENTER | Age: 58
End: 2023-02-21
Payer: COMMERCIAL

## 2023-03-02 ENCOUNTER — HOSPITAL ENCOUNTER (OUTPATIENT)
Dept: HEMATOLOGY ONCOLOGY | Facility: MEDICAL CENTER | Age: 58
End: 2023-03-02
Attending: STUDENT IN AN ORGANIZED HEALTH CARE EDUCATION/TRAINING PROGRAM
Payer: COMMERCIAL

## 2023-03-02 DIAGNOSIS — C34.90 ADENOCARCINOMA OF LUNG, UNSPECIFIED LATERALITY (HCC): ICD-10-CM

## 2023-03-02 PROCEDURE — 99213 OFFICE O/P EST LOW 20 MIN: CPT | Mod: 95 | Performed by: STUDENT IN AN ORGANIZED HEALTH CARE EDUCATION/TRAINING PROGRAM

## 2023-03-02 ASSESSMENT — ENCOUNTER SYMPTOMS
WEIGHT LOSS: 0
WHEEZING: 0
SHORTNESS OF BREATH: 1
FOCAL WEAKNESS: 0
DEPRESSION: 0
BRUISES/BLEEDS EASILY: 0
TREMORS: 0
NAUSEA: 0
MEMORY LOSS: 0
COUGH: 1
ORTHOPNEA: 0
DIZZINESS: 0
ABDOMINAL PAIN: 0
FEVER: 0
ROS GI COMMENTS: HEMORRHOID
HEARTBURN: 0
CHILLS: 0
SENSORY CHANGE: 0
SPUTUM PRODUCTION: 0
BLURRED VISION: 0
TINGLING: 0
SORE THROAT: 0
PALPITATIONS: 0
NECK PAIN: 0
HEADACHES: 0
VOMITING: 0

## 2023-03-02 NOTE — PROGRESS NOTES
Consult Note: Hematology/Oncology     Primary Care:  Yovany Packer M.D.    CC: Post care for newly diagnosed adenocarcinoma    Treatment history:  10/11/22: C1D1 Cisplatin, Pemetrexed and Nivolumab  11/01/22: C2D1 Carbo, Pemetrexed and Nivolumab (Cis changed to Carbo d/t tinnitus)  11/22/22: C3D1 Carbo, Pemetrexed and Nivolumab    2/1/2023: Lobectomy per Dr. Ganser    Prior Treatment: Surgery chemotherapy for metastatic nonseminomatous testicular cancer    This evaluation was conducted via Zoom using secure and encrypted videoconferencing technology. The patient was in their home in the [facility].    The patient's identity was confirmed and verbal consent was obtained for this virtual visit.     Subjective:   History of Presenting Illness:  Babatunde Sullivan is a 56 y.o. male with a past medical history of metastatic testicular cancer treated with surgery and chemotherapy in 1984 presents today to establish care for a new diagnosis of right middle lobe adenocarcinoma.    Patient reports that he has had a year and a half of coughing and shortness of breath.  In March 2020 he had hemoptysis resumed from coughing vigorously.  Since that time he continued to cough.  In January 2021 he had COVID and had an exacerbation of his shortness of breath and cough.  He had a CT chest which noted a lung mass in his right middle lobe he had a bronchoscopy with biopsy which confirmed adenocarcinoma.    Regarding patient's testicular cancer, he was diagnosed at age 18 in 1984.  He had metastases to his left kidney and lung.  He had his testicle removed as well as his left kidney and a thoracotomy with a wedge resection.  He was noted to be DYLAN at that time.    Note patient father had melanoma but passed from sepsis.  There is no other family history of cancer.  The patient is a senior living .  He denies smoking, he does not drink alcohol, and he denies IV drug use.  He lives with his wife and dog.  He has an adopted  23-year-old son who no longer lives at home.    Patient [place] in [place] of [place].    He is now s/p 3 rounds of chemotherapy.     Interval History    Patient reports that he is doing well.  He tolerated his surgery on February 2.  He notes that he is healing well and believes he is ready to return to work on March 15.        Past Medical History:   Diagnosis Date    Anesthesia     PONV    Arthritis     osteo-lower back    Back pain     Breath shortness     Bronchitis 03/2021    Bronchitis approx once a year    Cancer (HCC) 1984    testicular    Cancer (HCC) 09/2022    Lung CA    Carcinoma in situ of respiratory system     Chickenpox     Cough     Heart burn     History of blood transfusion 1984    Influenza     H/O    Obesity     PONV (postoperative nausea and vomiting)     Ringing in ears     Tonsillitis     H/O    Wears glasses     Wheezing         Past Surgical History:   Procedure Laterality Date    THORACOTOMY Right 2/1/2023    Procedure: RIGHT THORACOTOMY, MIDDLE AND LOWER LOBECTOMY, NODE DISSECTION;  Surgeon: John H Ganser, M.D.;  Location: [facility];  Service: General    NODE DISSECTION Right 2/1/2023    Procedure: LYMPHADENECTOMY;  Surgeon: John H Ganser, M.D.;  Location: [facility];  Service: General    NV BRONCHOSCOPY,DIAGNOSTIC N/A 09/01/2022    Procedure: FIBER OPTIC BRONCHOSCOPY WITH  BRUSH, BIOPSY, FINE NEEDLE ASPIRATION AND ENDOBRONCHIAL ULTRASOUND;  Surgeon: Adam Rahman M.D.;  Location: [facility];  Service: Pulmonary    ENDOBRONCHIAL US ADD-ON  09/01/2022    Procedure: ENDOBRONCHIAL ULTRASOUND (EBUS);  Surgeon: Adam Rahman M.D.;  Location: [facility];  Service: Pulmonary    ARTHROSCOPY, KNEE Right 2006    acl    JOCELINE BY LAPAROSCOPY  2002    scar tissue removed 2 years later (2004)    OTHER Left 1984    Embrionic carcinoma, testicle removed    WEDGE RESECTION LUNG Right 1984    toracotomy with wedge resection    MASS  EXCISION GENERAL Left 1984    Mass removed left kidney    TONSILLECTOMY  1972    OTHER ABDOMINAL SURGERY  1984    Lymphadenectomy and tumor removal    OTHER ORTHOPEDIC SURGERY  2006    Right ACL replacement       Social History     Tobacco Use    Smoking status: Never     Passive exposure: Past    Smokeless tobacco: Never    Tobacco comments:     Both of my parents smoked cigarettes   Vaping Use    Vaping Use: Never used   Substance Use Topics    Alcohol use: Not Currently     Alcohol/week: 10.8 oz     Types: 12 Cans of beer, 6 Shots of liquor per week     Comment: Have been on the [employer] for 30+ years    Drug use: Not Currently     Types: Oral     Comment: No drugs done in over 30 years        Family History   Problem Relation Age of Onset    Cancer Father             Hypertension Father     Stroke Father        Allergies   Allergen Reactions    Mefoxin Swelling     .       Current Outpatient Medications   Medication Sig Dispense Refill    benzonatate (TESSALON) 100 MG Cap Take 1 Capsule by mouth 3 times a day as needed for Cough. 60 Capsule 0    folic acid (FOLVITE) 1 MG Tab Take 1 Tablet by mouth every day for 105 days. Start 5-7 days prior to first cycle of pemetrexed 90 Tablet 1    acetaminophen (TYLENOL) 500 MG Tab Take 500-1,000 mg by mouth every 6 hours as needed.      Zinc Citrate (ZINC EXTRA STRENGTH PO) Take 1 Tablet by mouth every day.      KLOR-CON M20 20 MEQ Tab CR Take 20 mEq by mouth 3 times a day.      Multiple Vitamin (MULTIVITAMINS PO) Take 1 Tablet by mouth every day.      PSYLLIUM PO Take 5 Capsules by mouth 1 time a day as needed.      hydroCHLOROthiazide (HYDRODIURIL) 50 MG Tab Take 75 mg by mouth every day.       No current facility-administered medications for this encounter.       Review of Systems   Constitutional:  Negative for chills, fever, malaise/fatigue and weight loss.   HENT:  Positive for tinnitus (has improved). Negative for congestion, ear pain, nosebleeds and sore  throat.    Eyes:  Negative for blurred vision.   Respiratory:  Positive for cough (comes and goes) and shortness of breath (improved). Negative for sputum production and wheezing.    Cardiovascular:  Negative for chest pain, palpitations, orthopnea and leg swelling.   Gastrointestinal:  Negative for abdominal pain, heartburn, nausea and vomiting.        Hemorrhoid   Genitourinary:  Negative for dysuria, frequency and urgency.   Musculoskeletal:  Negative for neck pain.   Neurological:  Negative for dizziness, tingling, tremors, sensory change, focal weakness and headaches.   Endo/Heme/Allergies:  Does not bruise/bleed easily.   Psychiatric/Behavioral:  Negative for depression, memory loss and suicidal ideas.    All other systems reviewed and are negative.    Problem list, medications, and allergies reviewed by myself today in Epic.     Objective:     There were no vitals filed for this visit.      DESC; KARNOFSKY SCALE WITH ECOG EQUIVALENT: 100, Fully active, able to carry on all pre-disease performed without restriction (ECOG equivalent 0)    DISTRESS LEVEL: no apparent distress  NOT done due to telemedicine visit.  Previous visit noted below  Physical Exam  Constitutional:       General: He is not in acute distress.     Appearance: Normal appearance.   HENT:      Head: Normocephalic and atraumatic.      Nose: Nose normal. No congestion.      Mouth/Throat:      Mouth: Mucous membranes are moist.      Pharynx: Oropharynx is clear.   Eyes:      General: No scleral icterus.     Conjunctiva/sclera: Conjunctivae normal.      Pupils: Pupils are equal, round, and reactive to light.   Cardiovascular:      Rate and Rhythm: Normal rate and regular rhythm.      Pulses: Normal pulses.      Heart sounds: No murmur heard.    No friction rub.   Pulmonary:      Effort: Pulmonary effort is normal. No respiratory distress.      Breath sounds: Normal breath sounds. No stridor. No wheezing or rales.   Chest:      Chest wall: No  tenderness.   Abdominal:      General: Abdomen is flat. Bowel sounds are normal. There is no distension.      Palpations: Abdomen is soft. There is no mass.      Tenderness: There is no abdominal tenderness. There is no guarding or rebound.   Musculoskeletal:         General: No swelling, tenderness or deformity. Normal range of motion.      Cervical back: Normal range of motion and neck supple. No rigidity or tenderness.      Right lower leg: No edema.      Left lower leg: No edema.   Skin:     General: Skin is warm.      Coloration: Skin is not jaundiced or pale.      Findings: No bruising or rash.   Neurological:      General: No focal deficit present.      Mental Status: He is alert and oriented to person, place, and time. Mental status is at baseline.      Motor: No weakness.   Psychiatric:         Mood and Affect: Mood normal.         Behavior: Behavior normal.         Thought Content: Thought content normal.         Judgment: Judgment normal.       Labs:   Most recent labs reviewed.  Please see the lab tab of chart review    Imaging:   Most recent images below have been independently reviewed by me.      Chest  Mediastinum/Amy: There is a right hilar mass/lymphadenopathy with some mass effect on the right anterolateral lower lobe bronchus and encasement of the descending pulmonary artery. Due to postobstructive airspace disease in the right middle   lobe/anterior segment right lower lobe is difficult to obtain an accurate measurement of the suspected masslike area. The AP diameter is estimated at 3.4 cm.    Brain: PENDING    PET scan:  1.  Right hilar mass is again identified which is difficult to distinguish from the consolidated and atelectatic lung distal to the mass extending out to the pleural surface. There are some focal areas of elevated activity within the mass as described   above. Maximum activity level is 8.6 SUV. Differential diagnosis does include lung carcinoma. Metastatic carcinoma is also  possible. Inflammatory or infectious lesion is also in the differential diagnosis. Noted calcifications are also present.     2.  The consolidated atelectatic lung surrounding the major fissure in the right middle lobe and right lower lobe, which possibly includes a small amount of fluid in the major fissure located distal to the mass appears decreased slightly in thickness and   volume compared to the prior chest CT.     3.  No mediastinal or left hilar adenopathy. No elevated activity within these nodes.     4.  Postsurgical changes noted in the abdomen and pelvis.     5.  No PET/CT evidence of metastatic disease in the neck abdomen or pelvis.       Pathology:  FINAL DIAGNOSIS:     A. Right middle lobe, forceps biopsy:          Positive for moderately differentiated non-small cell           adenocarcinoma.          Please see comment.   B. Right middle lobe, brushing brush head with touch prep slides:          Atypical cells present suspicious for malignancy.   C. Right lower lobe posterior segment brushing brush head with touch   prep slides:          Bronchial epithelial cells some with reactive changes, scattered           macrophages, lymphocytes, degenerated cells, mucus and blood.          Few rare atypical cells present which cannot be further           characterized.   D. 11R, fine needle aspiration slides:          Positive for malignant cells.   E. 11R, fine needle aspiration core:          Positive for moderately differentiated non-small cell           adenocarcinoma morphologically resembles the tumor in specimen           A.          No lymph node tissue identified in sections examined    Brain MRI : negative for malignancy     Assessment/Plan:      Cancer Staging   Adenocarcinoma, lung (HCC)  Staging form: Lung, AJCC 8th Edition  - Clinical stage from 9/15/2022: Stage IIB (cT2a, cN1, cM0) - Signed by Clementine Howe M.D. on 9/15/2022       Diagnosis and Associated Orders:     There are no  diagnoses linked to this encounter.    Mr. Sullivan is a 56-year-old male with a new diagnosis of adenocarcinoma status post neoadjuvant treatment (3 cycles of nivolumab carboplatin and pemetrexed) and a lobectomy.  Patient tolerated the procedure well.     We had a discussion today regarding adjuvant therapy and how that would be useful in the setting of EGFR mutation.  Patient does not have this mutation however that we will begin surveillance.    Surveillance will consist of:    H&P and chest CT contrast every 3-6 mo for 3 y, then H&P and chest CT ± contrast every 6 mo for 2 y, then H&P and a low-dose non-contrast-enhanced chest CT annually    Plan:  CT chest 5/2023  I will see patient afterwards to discuss results on 5/15/23      Any questions and concerns raised by the patient were addressed and answered. Patient denies any further questions.  Patient encouraged to call the office with any concerns or issues.       Clementine Howe M.D.  Hematology/Oncology    24 minutes was spent on this visit

## 2023-03-07 ENCOUNTER — TELEPHONE (OUTPATIENT)
Dept: HEMATOLOGY ONCOLOGY | Facility: MEDICAL CENTER | Age: 58
End: 2023-03-07
Payer: COMMERCIAL

## 2023-03-07 NOTE — LETTER
March 7, 2023       Patient: Babatunde Sullivan   YOB: 1965   Date of Visit: 3/2/2023         To Whom It May Concern:    In my medical opinion, I recommend that Babatunde Sullivan return to full duty, no restrictions.    If you have any questions or concerns, please don't hesitate to call [phone]          Sincerely,          Clementine Howe MD  Electronically Signed

## 2023-05-07 DIAGNOSIS — C34.91 PRIMARY ADENOCARCINOMA OF RIGHT LUNG (HCC): ICD-10-CM

## 2023-05-08 ENCOUNTER — HOSPITAL ENCOUNTER (OUTPATIENT)
Dept: RADIOLOGY | Facility: MEDICAL CENTER | Age: 58
End: 2023-05-08
Attending: STUDENT IN AN ORGANIZED HEALTH CARE EDUCATION/TRAINING PROGRAM
Payer: COMMERCIAL

## 2023-05-08 DIAGNOSIS — C34.90 ADENOCARCINOMA OF LUNG, UNSPECIFIED LATERALITY (HCC): ICD-10-CM

## 2023-05-08 PROCEDURE — 71260 CT THORAX DX C+: CPT

## 2023-05-08 PROCEDURE — 700117 HCHG RX CONTRAST REV CODE 255: Performed by: STUDENT IN AN ORGANIZED HEALTH CARE EDUCATION/TRAINING PROGRAM

## 2023-05-08 RX ADMIN — IOHEXOL 75 ML: 350 INJECTION, SOLUTION INTRAVENOUS at 09:16

## 2023-05-09 RX ORDER — FOLIC ACID 1 MG/1
TABLET ORAL
Qty: 90 TABLET | Refills: 3 | Status: SHIPPED | OUTPATIENT
Start: 2023-05-09 | End: 2023-11-15

## 2023-05-12 ENCOUNTER — PATIENT OUTREACH (OUTPATIENT)
Dept: ONCOLOGY | Facility: MEDICAL CENTER | Age: 58
End: 2023-05-12

## 2023-05-15 ENCOUNTER — HOSPITAL ENCOUNTER (OUTPATIENT)
Dept: HEMATOLOGY ONCOLOGY | Facility: MEDICAL CENTER | Age: 58
End: 2023-05-15
Attending: STUDENT IN AN ORGANIZED HEALTH CARE EDUCATION/TRAINING PROGRAM
Payer: COMMERCIAL

## 2023-05-15 VITALS
TEMPERATURE: 99 F | HEART RATE: 79 BPM | OXYGEN SATURATION: 97 % | DIASTOLIC BLOOD PRESSURE: 78 MMHG | SYSTOLIC BLOOD PRESSURE: 116 MMHG | RESPIRATION RATE: 16 BRPM | HEIGHT: 76 IN | WEIGHT: 279.54 LBS | BODY MASS INDEX: 34.04 KG/M2

## 2023-05-15 DIAGNOSIS — C34.90 ADENOCARCINOMA OF LUNG, UNSPECIFIED LATERALITY (HCC): ICD-10-CM

## 2023-05-15 PROCEDURE — 99212 OFFICE O/P EST SF 10 MIN: CPT | Performed by: STUDENT IN AN ORGANIZED HEALTH CARE EDUCATION/TRAINING PROGRAM

## 2023-05-15 PROCEDURE — 3078F DIAST BP <80 MM HG: CPT | Performed by: STUDENT IN AN ORGANIZED HEALTH CARE EDUCATION/TRAINING PROGRAM

## 2023-05-15 PROCEDURE — 99214 OFFICE O/P EST MOD 30 MIN: CPT | Performed by: STUDENT IN AN ORGANIZED HEALTH CARE EDUCATION/TRAINING PROGRAM

## 2023-05-15 PROCEDURE — 3074F SYST BP LT 130 MM HG: CPT | Performed by: STUDENT IN AN ORGANIZED HEALTH CARE EDUCATION/TRAINING PROGRAM

## 2023-05-15 PROCEDURE — 1126F AMNT PAIN NOTED NONE PRSNT: CPT | Performed by: STUDENT IN AN ORGANIZED HEALTH CARE EDUCATION/TRAINING PROGRAM

## 2023-05-15 ASSESSMENT — ENCOUNTER SYMPTOMS
COUGH: 1
SENSORY CHANGE: 0
BRUISES/BLEEDS EASILY: 0
SHORTNESS OF BREATH: 1
NAUSEA: 0
VOMITING: 0
CHILLS: 0
WEIGHT LOSS: 0
BLURRED VISION: 0
ORTHOPNEA: 0
DEPRESSION: 0
PALPITATIONS: 0
DIZZINESS: 0
MEMORY LOSS: 0
NECK PAIN: 0
WHEEZING: 0
FOCAL WEAKNESS: 0
SORE THROAT: 0
TREMORS: 0
ABDOMINAL PAIN: 0
FEVER: 0
TINGLING: 0
HEARTBURN: 0
SPUTUM PRODUCTION: 0
ROS GI COMMENTS: HEMORRHOID
HEADACHES: 0

## 2023-05-15 ASSESSMENT — FIBROSIS 4 INDEX: FIB4 SCORE: 1.63

## 2023-05-15 NOTE — ADDENDUM NOTE
Encounter addended by: Anuj Mccullough on: 5/15/2023 11:50 AM   Actions taken: Charge Capture section accepted

## 2023-05-15 NOTE — PROGRESS NOTES
Consult Note: Hematology/Oncology     Primary Care:  Yovany Packer M.D.    CC: Post care for newly diagnosed adenocarcinoma    Treatment history:  10/11/22: C1D1 Cisplatin, Pemetrexed and Nivolumab  11/01/22: C2D1 Carbo, Pemetrexed and Nivolumab (Cis changed to Carbo d/t tinnitus)  11/22/22: C3D1 Carbo, Pemetrexed and Nivolumab    2/1/2023: Lobectomy per Dr. Ganser    Prior Treatment: Surgery chemotherapy for metastatic nonseminomatous testicular cancer  Subjective:   History of Presenting Illness:  Babatunde Sullivan is a 56 y.o. male with a past medical history of metastatic testicular cancer treated with surgery and chemotherapy in 1984 presents today to establish care for a new diagnosis of right middle lobe adenocarcinoma.    Patient reports that he has had a year and a half of coughing and shortness of breath.  In March 2020 he had hemoptysis resumed from coughing vigorously.  Since that time he continued to cough.  In January 2021 he had COVID and had an exacerbation of his shortness of breath and cough.  He had a CT chest which noted a lung mass in his right middle lobe he had a bronchoscopy with biopsy which confirmed adenocarcinoma.    Regarding patient's testicular cancer, he was diagnosed at age 18 in 1984.  He had metastases to his left kidney and lung.  He had his testicle removed as well as his left kidney and a thoracotomy with a wedge resection.  He was noted to be DYLAN at that time.    Note patient father had melanoma but passed from sepsis.  There is no other family history of cancer.  The patient is a [employer] .  He denies smoking, he does not drink alcohol, and he denies IV drug use.  He lives with his wife and dog.  He has an adopted 23-year-old son who no longer lives at home.    Patient lives in [place] which is [place] minutes east of [place].    He is now s/p 3 rounds of chemotherapy.     Interval History    Patient reports that he is doing well.     Patient returned to work 3/13.   "He reports being able to do everything he needs to do at work.     Denies any new headaches, chest pain (but some Rib pain), nausea vomiting diarrhea.    He has some SOB walking up hill and long distances.    He notes coughing fits.  These occur \"all the time\" and roughly 6x/day.  Any PE causes coughing fit.       Past Medical History:   Diagnosis Date    Anesthesia     PONV    Arthritis     osteo-lower back    Back pain     Breath shortness     Bronchitis 03/2021    Bronchitis approx once a year    Cancer (Formerly [facility]) 1984    testicular    Cancer ([facility]) 09/2022    Lung CA    Carcinoma in situ of respiratory system     Chickenpox     Cough     Heart burn     History of blood transfusion 1984    Influenza     H/O    Obesity     PONV (postoperative nausea and vomiting)     Ringing in ears     Tonsillitis     H/O    Wears glasses     Wheezing         Past Surgical History:   Procedure Laterality Date    THORACOTOMY Right 2/1/2023    Procedure: RIGHT THORACOTOMY, MIDDLE AND LOWER LOBECTOMY, NODE DISSECTION;  Surgeon: John H Ganser, M.D.;  Location: [facility];  Service: General    NODE DISSECTION Right 2/1/2023    Procedure: LYMPHADENECTOMY;  Surgeon: John H Ganser, M.D.;  Location: [facility];  Service: General    NC BRONCHOSCOPY,DIAGNOSTIC N/A 09/01/2022    Procedure: FIBER OPTIC BRONCHOSCOPY WITH  BRUSH, BIOPSY, FINE NEEDLE ASPIRATION AND ENDOBRONCHIAL ULTRASOUND;  Surgeon: Adam Rahman M.D.;  Location: [facility];  Service: Pulmonary    ENDOBRONCHIAL US ADD-ON  09/01/2022    Procedure: ENDOBRONCHIAL ULTRASOUND (EBUS);  Surgeon: Adam Rahman M.D.;  Location: [facility];  Service: Pulmonary    ARTHROSCOPY, KNEE Right 2006    acl    JOCELINE BY LAPAROSCOPY  2002    scar tissue removed 2 years later (2004)    OTHER Left 1984    Embrionic carcinoma, testicle removed    WEDGE RESECTION LUNG Right 1984    toracotomy with wedge resection    MASS EXCISION GENERAL Left 1984    Mass " removed left kidney    TONSILLECTOMY      OTHER ABDOMINAL SURGERY  1984    Lymphadenectomy and tumor removal    OTHER ORTHOPEDIC SURGERY  2006    Right ACL replacement       Social History     Tobacco Use    Smoking status: Never     Passive exposure: Past    Smokeless tobacco: Never    Tobacco comments:     Both of my parents smoked cigarettes   Vaping Use    Vaping Use: Never used   Substance Use Topics    Alcohol use: Not Currently     Alcohol/week: 10.8 oz     Types: 12 Cans of beer, 6 Shots of liquor per week     Comment: Have been on the [employer] for 30+ years    Drug use: Not Currently     Types: Oral     Comment: No drugs done in over 30 years        Family History   Problem Relation Age of Onset    Cancer Father             Hypertension Father     Stroke Father        Allergies   Allergen Reactions    Mefoxin Swelling     .       Current Outpatient Medications   Medication Sig Dispense Refill    folic acid (FOLVITE) 1 MG Tab TAKE 1 TABLET DAILY. START 5 TO 7 DAYS PRIOR TO FIRST CYCLE OF PEMETREXED 90 Tablet 3    benzonatate (TESSALON) 100 MG Cap Take 1 Capsule by mouth 3 times a day as needed for Cough. 60 Capsule 0    acetaminophen (TYLENOL) 500 MG Tab Take 500-1,000 mg by mouth every 6 hours as needed.      Zinc Citrate (ZINC EXTRA STRENGTH PO) Take 1 Tablet by mouth every day.      KLOR-CON M20 20 MEQ Tab CR Take 20 mEq by mouth 3 times a day.      Multiple Vitamin (MULTIVITAMINS PO) Take 1 Tablet by mouth every day.      PSYLLIUM PO Take 5 Capsules by mouth 1 time a day as needed.      hydroCHLOROthiazide (HYDRODIURIL) 50 MG Tab Take 75 mg by mouth every day.       No current facility-administered medications for this encounter.       Review of Systems   Constitutional:  Negative for chills, fever, malaise/fatigue and weight loss.   HENT:  Negative for congestion, ear pain, nosebleeds, sore throat and tinnitus.    Eyes:  Negative for blurred vision.   Respiratory:  Positive for cough and  "shortness of breath. Negative for sputum production and wheezing.    Cardiovascular:  Negative for chest pain, palpitations, orthopnea and leg swelling.   Gastrointestinal:  Negative for abdominal pain, heartburn, nausea and vomiting.        Hemorrhoid   Genitourinary:  Negative for dysuria, frequency and urgency.   Musculoskeletal:  Positive for joint pain (rib pain). Negative for neck pain.   Neurological:  Negative for dizziness, tingling, tremors, sensory change, focal weakness and headaches.   Endo/Heme/Allergies:  Does not bruise/bleed easily.   Psychiatric/Behavioral:  Negative for depression, memory loss and suicidal ideas.    All other systems reviewed and are negative.      Problem list, medications, and allergies reviewed by myself today in Epic.     Objective:     Vitals:    05/15/23 1116   Height: 1.93 m (6' 3.98\")         DESC; KARNOFSKY SCALE WITH ECOG EQUIVALENT: 100, Fully active, able to carry on all pre-disease performed without restriction (ECOG equivalent 0)    DISTRESS LEVEL: no apparent distress    Physical Exam  Constitutional:       General: He is not in acute distress.     Appearance: Normal appearance.   HENT:      Head: Normocephalic and atraumatic.      Nose: Nose normal. No congestion.      Mouth/Throat:      Mouth: Mucous membranes are moist.      Pharynx: Oropharynx is clear.   Eyes:      General: No scleral icterus.     Conjunctiva/sclera: Conjunctivae normal.      Pupils: Pupils are equal, round, and reactive to light.   Cardiovascular:      Rate and Rhythm: Normal rate and regular rhythm.      Pulses: Normal pulses.      Heart sounds: No murmur heard.     No friction rub.   Pulmonary:      Effort: Pulmonary effort is normal. No respiratory distress.      Breath sounds: Normal breath sounds. No stridor. No wheezing or rales.   Chest:      Chest wall: No tenderness.   Abdominal:      General: Abdomen is flat. Bowel sounds are normal. There is no distension.      Palpations: Abdomen " is soft. There is no mass.      Tenderness: There is no abdominal tenderness. There is no guarding or rebound.   Musculoskeletal:         General: No swelling, tenderness or deformity. Normal range of motion.      Cervical back: Normal range of motion and neck supple. No rigidity or tenderness.      Right lower leg: No edema.      Left lower leg: No edema.      Comments: R surgical scar, well healed   R axilla small lesion   Skin:     General: Skin is warm.      Coloration: Skin is not jaundiced or pale.      Findings: No bruising or rash.   Neurological:      General: No focal deficit present.      Mental Status: He is alert and oriented to person, place, and time. Mental status is at baseline.      Motor: No weakness.   Psychiatric:         Mood and Affect: Mood normal.         Behavior: Behavior normal.         Thought Content: Thought content normal.         Judgment: Judgment normal.         Labs:   Most recent labs reviewed.  Please see the lab tab of chart review    Imaging:   Most recent images below have been independently reviewed by me.      Chest  CT Ches 5/8/23  IMPRESSION:     Interval right mid and lower lobectomy with no evidence of local recurrence     Enlarging right paratracheal lymph node could be reactive although malignant origin cannot be excluded and attention in follow-up is advised     Small-moderate right pleural effusion is new and nonspecific.      Mediastinum/Amy: There is a right hilar mass/lymphadenopathy with some mass effect on the right anterolateral lower lobe bronchus and encasement of the descending pulmonary artery. Due to postobstructive airspace disease in the right middle   lobe/anterior segment right lower lobe is difficult to obtain an accurate measurement of the suspected masslike area. The AP diameter is estimated at 3.4 cm.    PET scan:  1.  Right hilar mass is again identified which is difficult to distinguish from the consolidated and atelectatic lung distal to the  mass extending out to the pleural surface. There are some focal areas of elevated activity within the mass as described   above. Maximum activity level is 8.6 SUV. Differential diagnosis does include lung carcinoma. Metastatic carcinoma is also possible. Inflammatory or infectious lesion is also in the differential diagnosis. Noted calcifications are also present.     2.  The consolidated atelectatic lung surrounding the major fissure in the right middle lobe and right lower lobe, which possibly includes a small amount of fluid in the major fissure located distal to the mass appears decreased slightly in thickness and   volume compared to the prior chest CT.     3.  No mediastinal or left hilar adenopathy. No elevated activity within these nodes.     4.  Postsurgical changes noted in the abdomen and pelvis.     5.  No PET/CT evidence of metastatic disease in the neck abdomen or pelvis.       Pathology:  FINAL DIAGNOSIS:     A. Right middle lobe, forceps biopsy:          Positive for moderately differentiated non-small cell           adenocarcinoma.          Please see comment.   B. Right middle lobe, brushing brush head with touch prep slides:          Atypical cells present suspicious for malignancy.   C. Right lower lobe posterior segment brushing brush head with touch   prep slides:          Bronchial epithelial cells some with reactive changes, scattered           macrophages, lymphocytes, degenerated cells, mucus and blood.          Few rare atypical cells present which cannot be further           characterized.   D. 11R, fine needle aspiration slides:          Positive for malignant cells.   E. 11R, fine needle aspiration core:          Positive for moderately differentiated non-small cell           adenocarcinoma morphologically resembles the tumor in specimen           A.          No lymph node tissue identified in sections examined    Brain MRI : negative for malignancy     Assessment/Plan:      Cancer  Staging   Adenocarcinoma, lung (HCC)  Staging form: Lung, AJCC 8th Edition  - Clinical stage from 9/15/2022: Stage IIB (cT2a, cN1, cM0) - Signed by Clementine Howe M.D. on 9/15/2022       Mr. Sullivan is a 56-year-old male with a new diagnosis of adenocarcinoma status post neoadjuvant treatment (3 cycles of nivolumab carboplatin and pemetrexed) and a lobectomy. He is here for surveillance.    On CT chest, There is a suspicious right paratracheal node which measures 7 x 14 mm. On comparison this measured 6 x 11 mm.    Plan:  PET scan now   I will see patient afterwards to discuss results 1 week later    Surveillance will consist of:    H&P and chest CT contrast every 3-6 mo for 3 y, then H&P and chest CT ± contrast every 6 mo for 2 y, then H&P and a low-dose non-contrast-enhanced chest CT annually    Any questions and concerns raised by the patient were addressed and answered. Patient denies any further questions.  Patient encouraged to call the office with any concerns or issues.       Clementine Howe M.D.  Hematology/Oncology    31 minutes was spent on this visit

## 2023-06-01 ENCOUNTER — HOSPITAL ENCOUNTER (OUTPATIENT)
Dept: RADIOLOGY | Facility: MEDICAL CENTER | Age: 58
End: 2023-06-01
Attending: STUDENT IN AN ORGANIZED HEALTH CARE EDUCATION/TRAINING PROGRAM
Payer: COMMERCIAL

## 2023-06-03 ENCOUNTER — HOSPITAL ENCOUNTER (OUTPATIENT)
Dept: LAB | Facility: MEDICAL CENTER | Age: 58
End: 2023-06-03
Attending: STUDENT IN AN ORGANIZED HEALTH CARE EDUCATION/TRAINING PROGRAM
Payer: COMMERCIAL

## 2023-06-03 DIAGNOSIS — C34.90 ADENOCARCINOMA OF LUNG, UNSPECIFIED LATERALITY (HCC): ICD-10-CM

## 2023-06-03 LAB
BASOPHILS # BLD AUTO: 0.9 % (ref 0–1.8)
BASOPHILS # BLD: 0.05 K/UL (ref 0–0.12)
EOSINOPHIL # BLD AUTO: 0.2 K/UL (ref 0–0.51)
EOSINOPHIL NFR BLD: 3.5 % (ref 0–6.9)
ERYTHROCYTE [DISTWIDTH] IN BLOOD BY AUTOMATED COUNT: 50.5 FL (ref 35.9–50)
HCT VFR BLD AUTO: 43.7 % (ref 42–52)
HGB BLD-MCNC: 14.8 G/DL (ref 14–18)
IMM GRANULOCYTES # BLD AUTO: 0.03 K/UL (ref 0–0.11)
IMM GRANULOCYTES NFR BLD AUTO: 0.5 % (ref 0–0.9)
LYMPHOCYTES # BLD AUTO: 1.29 K/UL (ref 1–4.8)
LYMPHOCYTES NFR BLD: 22.9 % (ref 22–41)
MCH RBC QN AUTO: 33.3 PG (ref 27–33)
MCHC RBC AUTO-ENTMCNC: 33.9 G/DL (ref 32.3–36.5)
MCV RBC AUTO: 98.2 FL (ref 81.4–97.8)
MONOCYTES # BLD AUTO: 0.6 K/UL (ref 0–0.85)
MONOCYTES NFR BLD AUTO: 10.6 % (ref 0–13.4)
NEUTROPHILS # BLD AUTO: 3.47 K/UL (ref 1.82–7.42)
NEUTROPHILS NFR BLD: 61.6 % (ref 44–72)
NRBC # BLD AUTO: 0 K/UL
NRBC BLD-RTO: 0 /100 WBC (ref 0–0.2)
PLATELET # BLD AUTO: 176 K/UL (ref 164–446)
PMV BLD AUTO: 10.6 FL (ref 9–12.9)
RBC # BLD AUTO: 4.45 M/UL (ref 4.7–6.1)
WBC # BLD AUTO: 5.6 K/UL (ref 4.8–10.8)

## 2023-06-03 PROCEDURE — 36415 COLL VENOUS BLD VENIPUNCTURE: CPT

## 2023-06-03 PROCEDURE — 80053 COMPREHEN METABOLIC PANEL: CPT

## 2023-06-03 PROCEDURE — 85025 COMPLETE CBC W/AUTO DIFF WBC: CPT

## 2023-06-04 LAB
ALBUMIN SERPL BCP-MCNC: 4 G/DL (ref 3.2–4.9)
ALBUMIN/GLOB SERPL: 1.4 G/DL
ALP SERPL-CCNC: 78 U/L (ref 30–99)
ALT SERPL-CCNC: 24 U/L (ref 2–50)
ANION GAP SERPL CALC-SCNC: 10 MMOL/L (ref 7–16)
AST SERPL-CCNC: 23 U/L (ref 12–45)
BILIRUB SERPL-MCNC: 0.3 MG/DL (ref 0.1–1.5)
BUN SERPL-MCNC: 18 MG/DL (ref 8–22)
CALCIUM ALBUM COR SERPL-MCNC: 9.1 MG/DL (ref 8.5–10.5)
CALCIUM SERPL-MCNC: 9.1 MG/DL (ref 8.5–10.5)
CHLORIDE SERPL-SCNC: 104 MMOL/L (ref 96–112)
CO2 SERPL-SCNC: 30 MMOL/L (ref 20–33)
CREAT SERPL-MCNC: 0.81 MG/DL (ref 0.5–1.4)
FASTING STATUS PATIENT QL REPORTED: NORMAL
GFR SERPLBLD CREATININE-BSD FMLA CKD-EPI: 104 ML/MIN/1.73 M 2
GLOBULIN SER CALC-MCNC: 2.9 G/DL (ref 1.9–3.5)
GLUCOSE SERPL-MCNC: 130 MG/DL (ref 65–99)
POTASSIUM SERPL-SCNC: 4.2 MMOL/L (ref 3.6–5.5)
PROT SERPL-MCNC: 6.9 G/DL (ref 6–8.2)
SODIUM SERPL-SCNC: 144 MMOL/L (ref 135–145)

## 2023-06-08 ENCOUNTER — APPOINTMENT (OUTPATIENT)
Dept: HEMATOLOGY ONCOLOGY | Facility: MEDICAL CENTER | Age: 58
End: 2023-06-08
Payer: COMMERCIAL

## 2023-06-14 ENCOUNTER — HOSPITAL ENCOUNTER (OUTPATIENT)
Dept: RADIOLOGY | Facility: MEDICAL CENTER | Age: 58
End: 2023-06-14
Attending: STUDENT IN AN ORGANIZED HEALTH CARE EDUCATION/TRAINING PROGRAM
Payer: COMMERCIAL

## 2023-06-14 DIAGNOSIS — C34.90 ADENOCARCINOMA OF LUNG, UNSPECIFIED LATERALITY (HCC): ICD-10-CM

## 2023-06-14 PROCEDURE — A9552 F18 FDG: HCPCS

## 2023-06-16 ENCOUNTER — HOSPITAL ENCOUNTER (OUTPATIENT)
Dept: HEMATOLOGY ONCOLOGY | Facility: MEDICAL CENTER | Age: 58
End: 2023-06-16
Attending: STUDENT IN AN ORGANIZED HEALTH CARE EDUCATION/TRAINING PROGRAM
Payer: COMMERCIAL

## 2023-06-16 DIAGNOSIS — C34.90 ADENOCARCINOMA OF LUNG, UNSPECIFIED LATERALITY (HCC): ICD-10-CM

## 2023-06-16 PROCEDURE — 99213 OFFICE O/P EST LOW 20 MIN: CPT | Mod: 95 | Performed by: STUDENT IN AN ORGANIZED HEALTH CARE EDUCATION/TRAINING PROGRAM

## 2023-06-16 ASSESSMENT — ENCOUNTER SYMPTOMS
NECK PAIN: 0
SPUTUM PRODUCTION: 0
COUGH: 1
SHORTNESS OF BREATH: 1
DEPRESSION: 0
DIZZINESS: 0
FOCAL WEAKNESS: 0
HEARTBURN: 0
ORTHOPNEA: 0
HEADACHES: 0
SORE THROAT: 0
PALPITATIONS: 0
VOMITING: 0
WEIGHT LOSS: 0
NAUSEA: 0
WHEEZING: 0
FEVER: 0
ROS GI COMMENTS: HEMORRHOID
SENSORY CHANGE: 0
ABDOMINAL PAIN: 0
CHILLS: 0
MEMORY LOSS: 0
TINGLING: 0
BRUISES/BLEEDS EASILY: 0
TREMORS: 0
BLURRED VISION: 0

## 2023-06-16 NOTE — PROGRESS NOTES
Consult Note: Hematology/Oncology     Primary Care:  Yovany Packer M.D.    CC: Post care for newly diagnosed adenocarcinoma    Treatment history:  10/11/22: C1D1 Cisplatin, Pemetrexed and Nivolumab  11/01/22: C2D1 Carbo, Pemetrexed and Nivolumab (Cis changed to Carbo d/t tinnitus)  11/22/22: C3D1 Carbo, Pemetrexed and Nivolumab    2/1/2023: Lobectomy per Dr. Ganser    Prior Treatment: Surgery chemotherapy for metastatic nonseminomatous testicular cancer    This evaluation was conducted via Zoom using secure and encrypted videoconferencing technology. The patient was in their home in the [facility].    The patient's identity was confirmed and verbal consent was obtained for this virtual visit.    Subjective:   History of Presenting Illness:  Babatunde Sullivan is a 57 y.o. male with a past medical history of metastatic testicular cancer treated with surgery and chemotherapy in 1984 presents today to establish care for a new diagnosis of right middle lobe adenocarcinoma.    Patient reports that he has had a year and a half of coughing and shortness of breath.  In March 2020 he had hemoptysis resumed from coughing vigorously.  Since that time he continued to cough.  In January 2021 he had COVID and had an exacerbation of his shortness of breath and cough.  He had a CT chest which noted a lung mass in his right middle lobe he had a bronchoscopy with biopsy which confirmed adenocarcinoma.    Regarding patient's testicular cancer, he was diagnosed at age 18 in 1984.  He had metastases to his left kidney and lung.  He had his testicle removed as well as his left kidney and a thoracotomy with a wedge resection.  He was noted to be DYLAN at that time.    Note patient father had melanoma but passed from sepsis.  There is no other family history of cancer.  The patient is a [employer] .  He denies smoking, he does not drink alcohol, and he denies IV drug use.  He lives with his wife and dog.  He has an adopted  23-year-old son who no longer lives at home.    Patient [place] in [place] is [place] east of [place].    He is now s/p 3 rounds of chemotherapy.     Interval History    Patient reports that he is doing well.  His rib pain has completely resolved.    Denies any new headaches, chest pain, nausea vomiting diarrhea.  No pain in ribs.     He notes that he continues to have coughing fits and still reports that they are about 6 times a day.    Our visit today is to go over his most recent PET scan      Past Medical History:   Diagnosis Date    Anesthesia     PONV    Arthritis     osteo-lower back    Back pain     Breath shortness     Bronchitis 03/2021    Bronchitis approx once a year    Cancer (HCC) 1984    testicular    Cancer (HCC) 09/2022    Lung CA    Carcinoma in situ of respiratory system     Chickenpox     Cough     Heart burn     History of blood transfusion 1984    Influenza     H/O    Obesity     PONV (postoperative nausea and vomiting)     Ringing in ears     Tonsillitis     H/O    Wears glasses     Wheezing         Past Surgical History:   Procedure Laterality Date    THORACOTOMY Right 2/1/2023    Procedure: RIGHT THORACOTOMY, MIDDLE AND LOWER LOBECTOMY, NODE DISSECTION;  Surgeon: John H Ganser, M.D.;  Location: [facility];  Service: General    NODE DISSECTION Right 2/1/2023    Procedure: LYMPHADENECTOMY;  Surgeon: John H Ganser, M.D.;  Location: [facility];  Service: General    TX BRONCHOSCOPY,DIAGNOSTIC N/A 09/01/2022    Procedure: FIBER OPTIC BRONCHOSCOPY WITH  BRUSH, BIOPSY, FINE NEEDLE ASPIRATION AND ENDOBRONCHIAL ULTRASOUND;  Surgeon: Adam Rahman M.D.;  Location: [facility];  Service: Pulmonary    ENDOBRONCHIAL US ADD-ON  09/01/2022    Procedure: ENDOBRONCHIAL ULTRASOUND (EBUS);  Surgeon: Adam Rahman M.D.;  Location: [facility];  Service: Pulmonary    ARTHROSCOPY, KNEE Right 2006    acl    JOCELINE BY LAPAROSCOPY  2002    scar tissue removed 2  years later ()    OTHER Left     Embrionic carcinoma, testicle removed    WEDGE RESECTION LUNG Right     toracotomy with wedge resection    MASS EXCISION GENERAL Left     Mass removed left kidney    TONSILLECTOMY  1972    OTHER ABDOMINAL SURGERY  1984    Lymphadenectomy and tumor removal    OTHER ORTHOPEDIC SURGERY  2006    Right ACL replacement       Social History     Tobacco Use    Smoking status: Never     Passive exposure: Past    Smokeless tobacco: Never    Tobacco comments:     Both of my parents smoked cigarettes   Vaping Use    Vaping Use: Never used   Substance Use Topics    Alcohol use: Not Currently     Alcohol/week: 10.8 oz     Types: 12 Cans of beer, 6 Shots of liquor per week     Comment: Have been on the [employer] for 30+ years    Drug use: Not Currently     Types: Oral     Comment: No drugs done in over 30 years        Family History   Problem Relation Age of Onset    Cancer Father             Hypertension Father     Stroke Father        Allergies   Allergen Reactions    Mefoxin Swelling     .       Current Outpatient Medications   Medication Sig Dispense Refill    folic acid (FOLVITE) 1 MG Tab TAKE 1 TABLET DAILY. START 5 TO 7 DAYS PRIOR TO FIRST CYCLE OF PEMETREXED 90 Tablet 3    benzonatate (TESSALON) 100 MG Cap Take 1 Capsule by mouth 3 times a day as needed for Cough. 60 Capsule 0    acetaminophen (TYLENOL) 500 MG Tab Take 500-1,000 mg by mouth every 6 hours as needed.      Zinc Citrate (ZINC EXTRA STRENGTH PO) Take 1 Tablet by mouth every day.      KLOR-CON M20 20 MEQ Tab CR Take 20 mEq by mouth 3 times a day.      Multiple Vitamin (MULTIVITAMINS PO) Take 1 Tablet by mouth every day.      PSYLLIUM PO Take 5 Capsules by mouth 1 time a day as needed.      hydroCHLOROthiazide (HYDRODIURIL) 50 MG Tab Take 75 mg by mouth every day.       No current facility-administered medications for this visit.       Review of Systems   Constitutional:  Negative for chills, fever,  malaise/fatigue and weight loss.   HENT:  Negative for congestion, ear pain, nosebleeds, sore throat and tinnitus.    Eyes:  Negative for blurred vision.   Respiratory:  Positive for cough and shortness of breath. Negative for sputum production and wheezing.    Cardiovascular:  Negative for chest pain, palpitations, orthopnea and leg swelling.   Gastrointestinal:  Negative for abdominal pain, heartburn, nausea and vomiting.        Hemorrhoid   Genitourinary:  Negative for dysuria, frequency and urgency.   Musculoskeletal:  Positive for joint pain (rib pain). Negative for neck pain.   Neurological:  Negative for dizziness, tingling, tremors, sensory change, focal weakness and headaches.   Endo/Heme/Allergies:  Does not bruise/bleed easily.   Psychiatric/Behavioral:  Negative for depression, memory loss and suicidal ideas.    All other systems reviewed and are negative.      Problem list, medications, and allergies reviewed by myself today in Epic.     Objective:     There were no vitals filed for this visit.        DESC; KARNOFSKY SCALE WITH ECOG EQUIVALENT: 100, Fully active, able to carry on all pre-disease performed without restriction (ECOG equivalent 0)    DISTRESS LEVEL: no apparent distress    Physical Exam  No physical exam done due to virtual visit.      Labs:   Most recent labs reviewed.  Please see the lab tab of chart review    Imaging:   Most recent images below have been independently reviewed by me.      Chest  CT Ches 5/8/23  IMPRESSION:     Interval right mid and lower lobectomy with no evidence of local recurrence     Enlarging right paratracheal lymph node could be reactive although malignant origin cannot be excluded and attention in follow-up is advised     Small-moderate right pleural effusion is new and nonspecific.      Mediastinum/Amy: There is a right hilar mass/lymphadenopathy with some mass effect on the right anterolateral lower lobe bronchus and encasement of the descending pulmonary  artery. Due to postobstructive airspace disease in the right middle   lobe/anterior segment right lower lobe is difficult to obtain an accurate measurement of the suspected masslike area. The AP diameter is estimated at 3.4 cm.    PET scan:  1.  Right hilar mass is again identified which is difficult to distinguish from the consolidated and atelectatic lung distal to the mass extending out to the pleural surface. There are some focal areas of elevated activity within the mass as described   above. Maximum activity level is 8.6 SUV. Differential diagnosis does include lung carcinoma. Metastatic carcinoma is also possible. Inflammatory or infectious lesion is also in the differential diagnosis. Noted calcifications are also present.     2.  The consolidated atelectatic lung surrounding the major fissure in the right middle lobe and right lower lobe, which possibly includes a small amount of fluid in the major fissure located distal to the mass appears decreased slightly in thickness and   volume compared to the prior chest CT.     3.  No mediastinal or left hilar adenopathy. No elevated activity within these nodes.     4.  Postsurgical changes noted in the abdomen and pelvis.     5.  No PET/CT evidence of metastatic disease in the neck abdomen or pelvis.       Pathology:  FINAL DIAGNOSIS:     A. Right middle lobe, forceps biopsy:          Positive for moderately differentiated non-small cell           adenocarcinoma.          Please see comment.   B. Right middle lobe, brushing brush head with touch prep slides:          Atypical cells present suspicious for malignancy.   C. Right lower lobe posterior segment brushing brush head with touch   prep slides:          Bronchial epithelial cells some with reactive changes, scattered           macrophages, lymphocytes, degenerated cells, mucus and blood.          Few rare atypical cells present which cannot be further           characterized.   D. 11R, fine needle aspiration  slides:          Positive for malignant cells.   E. 11R, fine needle aspiration core:          Positive for moderately differentiated non-small cell           adenocarcinoma morphologically resembles the tumor in specimen           A.          No lymph node tissue identified in sections examined    Brain MRI : negative for malignancy     Assessment/Plan:      Cancer Staging   Adenocarcinoma, lung (HCC)  Staging form: Lung, AJCC 8th Edition  - Clinical stage from 9/15/2022: Stage IIB (cT2a, cN1, cM0) - Signed by Clementine Howe M.D. on 9/15/2022       Mr. Sullivan is a 57-year-old male with a new diagnosis of adenocarcinoma status post neoadjuvant treatment (3 cycles of nivolumab carboplatin and pemetrexed) and a lobectomy. He is here for surveillance.    On CT chest, There is a suspicious right paratracheal node which measures 7 x 14 mm. On comparison this measured 6 x 11 mm.  His most recent PET scan shows that his right paratracheal node is morphologically normal.  There was no focal hyper metabolism and it was read as unlikely to be metastatic.  There is no other FDG avid metastatic disease.    Plan:  CT contrast in 3 months (9/16/23)  I will see patient afterwards to discuss results 1 week later    Surveillance will consist of:    H&P and chest CT contrast every 3-6 mo for 3 y, then H&P and chest CT ± contrast every 6 mo for 2 y, then H&P and a low-dose non-contrast-enhanced chest CT annually    Any questions and concerns raised by the patient were addressed and answered. Patient denies any further questions.  Patient encouraged to call the office with any concerns or issues.       Clementine Howe M.D.  Hematology/Oncology    22 minutes was spent on this visit

## 2023-06-20 ENCOUNTER — PATIENT MESSAGE (OUTPATIENT)
Dept: HEMATOLOGY ONCOLOGY | Facility: MEDICAL CENTER | Age: 58
End: 2023-06-20
Payer: COMMERCIAL

## 2023-06-20 DIAGNOSIS — Z79.899 ENCOUNTER FOR LONG-TERM CURRENT USE OF HIGH RISK MEDICATION: ICD-10-CM

## 2023-07-09 SDOH — HEALTH STABILITY: PHYSICAL HEALTH: ON AVERAGE, HOW MANY DAYS PER WEEK DO YOU ENGAGE IN MODERATE TO STRENUOUS EXERCISE (LIKE A BRISK WALK)?: 0 DAYS

## 2023-07-09 SDOH — ECONOMIC STABILITY: TRANSPORTATION INSECURITY
IN THE PAST 12 MONTHS, HAS LACK OF TRANSPORTATION KEPT YOU FROM MEETINGS, WORK, OR FROM GETTING THINGS NEEDED FOR DAILY LIVING?: NO

## 2023-07-09 SDOH — ECONOMIC STABILITY: HOUSING INSECURITY
IN THE LAST 12 MONTHS, WAS THERE A TIME WHEN YOU DID NOT HAVE A STEADY PLACE TO SLEEP OR SLEPT IN A SHELTER (INCLUDING NOW)?: NO

## 2023-07-09 SDOH — ECONOMIC STABILITY: TRANSPORTATION INSECURITY
IN THE PAST 12 MONTHS, HAS LACK OF RELIABLE TRANSPORTATION KEPT YOU FROM MEDICAL APPOINTMENTS, MEETINGS, WORK OR FROM GETTING THINGS NEEDED FOR DAILY LIVING?: NO

## 2023-07-09 SDOH — HEALTH STABILITY: PHYSICAL HEALTH: ON AVERAGE, HOW MANY MINUTES DO YOU ENGAGE IN EXERCISE AT THIS LEVEL?: PATIENT DECLINED

## 2023-07-09 SDOH — HEALTH STABILITY: MENTAL HEALTH
STRESS IS WHEN SOMEONE FEELS TENSE, NERVOUS, ANXIOUS, OR CAN'T SLEEP AT NIGHT BECAUSE THEIR MIND IS TROUBLED. HOW STRESSED ARE YOU?: NOT AT ALL

## 2023-07-09 SDOH — ECONOMIC STABILITY: INCOME INSECURITY: IN THE LAST 12 MONTHS, WAS THERE A TIME WHEN YOU WERE NOT ABLE TO PAY THE MORTGAGE OR RENT ON TIME?: NO

## 2023-07-09 SDOH — ECONOMIC STABILITY: FOOD INSECURITY: WITHIN THE PAST 12 MONTHS, YOU WORRIED THAT YOUR FOOD WOULD RUN OUT BEFORE YOU GOT MONEY TO BUY MORE.: NEVER TRUE

## 2023-07-09 SDOH — ECONOMIC STABILITY: FOOD INSECURITY: WITHIN THE PAST 12 MONTHS, THE FOOD YOU BOUGHT JUST DIDN'T LAST AND YOU DIDN'T HAVE MONEY TO GET MORE.: NEVER TRUE

## 2023-07-09 SDOH — ECONOMIC STABILITY: TRANSPORTATION INSECURITY
IN THE PAST 12 MONTHS, HAS THE LACK OF TRANSPORTATION KEPT YOU FROM MEDICAL APPOINTMENTS OR FROM GETTING MEDICATIONS?: NO

## 2023-07-09 SDOH — ECONOMIC STABILITY: HOUSING INSECURITY: IN THE LAST 12 MONTHS, HOW MANY PLACES HAVE YOU LIVED?: 1

## 2023-07-09 SDOH — ECONOMIC STABILITY: INCOME INSECURITY: HOW HARD IS IT FOR YOU TO PAY FOR THE VERY BASICS LIKE FOOD, HOUSING, MEDICAL CARE, AND HEATING?: NOT HARD AT ALL

## 2023-07-09 ASSESSMENT — LIFESTYLE VARIABLES
HOW OFTEN DO YOU HAVE SIX OR MORE DRINKS ON ONE OCCASION: NEVER
HOW OFTEN DO YOU HAVE A DRINK CONTAINING ALCOHOL: NEVER
HOW MANY STANDARD DRINKS CONTAINING ALCOHOL DO YOU HAVE ON A TYPICAL DAY: PATIENT DOES NOT DRINK
SKIP TO QUESTIONS 9-10: 1
AUDIT-C TOTAL SCORE: 0

## 2023-07-09 ASSESSMENT — SOCIAL DETERMINANTS OF HEALTH (SDOH)
HOW OFTEN DO YOU HAVE SIX OR MORE DRINKS ON ONE OCCASION: NEVER
HOW OFTEN DO YOU ATTEND CHURCH OR RELIGIOUS SERVICES?: MORE THAN 4 TIMES PER YEAR
WITHIN THE PAST 12 MONTHS, YOU WORRIED THAT YOUR FOOD WOULD RUN OUT BEFORE YOU GOT THE MONEY TO BUY MORE: NEVER TRUE
HOW MANY DRINKS CONTAINING ALCOHOL DO YOU HAVE ON A TYPICAL DAY WHEN YOU ARE DRINKING: PATIENT DOES NOT DRINK
HOW OFTEN DO YOU ATTENT MEETINGS OF THE CLUB OR ORGANIZATION YOU BELONG TO?: PATIENT DECLINED
DO YOU BELONG TO ANY CLUBS OR ORGANIZATIONS SUCH AS CHURCH GROUPS UNIONS, FRATERNAL OR ATHLETIC GROUPS, OR SCHOOL GROUPS?: NO
HOW OFTEN DO YOU ATTEND CHURCH OR RELIGIOUS SERVICES?: MORE THAN 4 TIMES PER YEAR
HOW HARD IS IT FOR YOU TO PAY FOR THE VERY BASICS LIKE FOOD, HOUSING, MEDICAL CARE, AND HEATING?: NOT HARD AT ALL
HOW OFTEN DO YOU GET TOGETHER WITH FRIENDS OR RELATIVES?: NEVER
IN A TYPICAL WEEK, HOW MANY TIMES DO YOU TALK ON THE PHONE WITH FAMILY, FRIENDS, OR NEIGHBORS?: NEVER
DO YOU BELONG TO ANY CLUBS OR ORGANIZATIONS SUCH AS CHURCH GROUPS UNIONS, FRATERNAL OR ATHLETIC GROUPS, OR SCHOOL GROUPS?: NO
HOW OFTEN DO YOU ATTENT MEETINGS OF THE CLUB OR ORGANIZATION YOU BELONG TO?: PATIENT DECLINED
HOW OFTEN DO YOU GET TOGETHER WITH FRIENDS OR RELATIVES?: NEVER
HOW OFTEN DO YOU HAVE A DRINK CONTAINING ALCOHOL: NEVER
IN A TYPICAL WEEK, HOW MANY TIMES DO YOU TALK ON THE PHONE WITH FAMILY, FRIENDS, OR NEIGHBORS?: NEVER

## 2023-07-12 ENCOUNTER — HOSPITAL ENCOUNTER (OUTPATIENT)
Dept: RADIOLOGY | Facility: MEDICAL CENTER | Age: 58
End: 2023-07-12
Attending: STUDENT IN AN ORGANIZED HEALTH CARE EDUCATION/TRAINING PROGRAM
Payer: COMMERCIAL

## 2023-07-12 ENCOUNTER — OFFICE VISIT (OUTPATIENT)
Dept: MEDICAL GROUP | Facility: PHYSICIAN GROUP | Age: 58
End: 2023-07-12
Payer: COMMERCIAL

## 2023-07-12 VITALS
TEMPERATURE: 98.6 F | DIASTOLIC BLOOD PRESSURE: 80 MMHG | BODY MASS INDEX: 34.46 KG/M2 | HEART RATE: 71 BPM | WEIGHT: 283 LBS | SYSTOLIC BLOOD PRESSURE: 132 MMHG | HEIGHT: 76 IN | OXYGEN SATURATION: 97 %

## 2023-07-12 DIAGNOSIS — Z85.47 HISTORY OF TESTICULAR CANCER: ICD-10-CM

## 2023-07-12 DIAGNOSIS — M25.511 ACUTE PAIN OF RIGHT SHOULDER: ICD-10-CM

## 2023-07-12 DIAGNOSIS — E78.5 DYSLIPIDEMIA: ICD-10-CM

## 2023-07-12 DIAGNOSIS — Z76.89 ENCOUNTER TO ESTABLISH CARE: ICD-10-CM

## 2023-07-12 DIAGNOSIS — Z11.59 NEED FOR HEPATITIS C SCREENING TEST: ICD-10-CM

## 2023-07-12 DIAGNOSIS — K40.90 RIGHT INGUINAL HERNIA: ICD-10-CM

## 2023-07-12 DIAGNOSIS — C34.91 ADENOCARCINOMA OF RIGHT LUNG (HCC): ICD-10-CM

## 2023-07-12 DIAGNOSIS — Z12.11 COLON CANCER SCREENING: ICD-10-CM

## 2023-07-12 DIAGNOSIS — R73.01 ELEVATED FASTING GLUCOSE: ICD-10-CM

## 2023-07-12 DIAGNOSIS — Z11.4 ENCOUNTER FOR SCREENING FOR HIV: ICD-10-CM

## 2023-07-12 LAB
HBA1C MFR BLD: 5.8 % (ref ?–5.8)
POCT INT CON NEG: NEGATIVE
POCT INT CON POS: POSITIVE

## 2023-07-12 PROCEDURE — 73030 X-RAY EXAM OF SHOULDER: CPT | Mod: RT

## 2023-07-12 PROCEDURE — 99215 OFFICE O/P EST HI 40 MIN: CPT | Performed by: STUDENT IN AN ORGANIZED HEALTH CARE EDUCATION/TRAINING PROGRAM

## 2023-07-12 PROCEDURE — 3079F DIAST BP 80-89 MM HG: CPT | Performed by: STUDENT IN AN ORGANIZED HEALTH CARE EDUCATION/TRAINING PROGRAM

## 2023-07-12 PROCEDURE — 3075F SYST BP GE 130 - 139MM HG: CPT | Performed by: STUDENT IN AN ORGANIZED HEALTH CARE EDUCATION/TRAINING PROGRAM

## 2023-07-12 PROCEDURE — 83036 HEMOGLOBIN GLYCOSYLATED A1C: CPT | Performed by: STUDENT IN AN ORGANIZED HEALTH CARE EDUCATION/TRAINING PROGRAM

## 2023-07-12 RX ORDER — OMEPRAZOLE 20 MG/1
CAPSULE, DELAYED RELEASE ORAL
COMMUNITY
Start: 2023-04-19 | End: 2023-07-12

## 2023-07-12 ASSESSMENT — FIBROSIS 4 INDEX: FIB4 SCORE: 1.47

## 2023-07-12 NOTE — PROGRESS NOTES
Subjective:     CC:  establish scare    HISTORY OF THE PRESENT ILLNESS: Patient is a 57 y.o. male here today to establish care.    Right inguinal hernia  Recent CT ordered by oncology showed small fat-containing right inguinal hernia and patient has appointment with surgery on 7/25.    Adenocarcinoma, lung (HCC)  Diagnosed September 2022.  Patient underwent chemo and surgery (right middle and lower lobe removed).  Follows up with oncology (Dr. Howe) and gets CT every 3 months.    History of testicular cancer  History of left testicle embryonic carcinoma.  Patient underwent orchiectomy, removal of mass from left kidney and lymphadenectomy in 1984.  Currently taking hydrochlorothiazide 50 mg twice daily.    Acute pain of right shoulder  Patient reports history of severe osteoarthritis in his left shoulder for which she has received injections in the past.  Patient reports his right shoulder started hurting 1 month ago.  Patient reports Tylenol and ibuprofen help.      Health Maintenance: Completed  - refused all vaccines    Allergies   Allergen Reactions    Mefoxin Swelling     .     Patient Active Problem List   Diagnosis    Thrombosis    Lung mass    Adenocarcinoma, lung (HCC)    Right inguinal hernia    History of testicular cancer    Acute pain of right shoulder     Current Outpatient Medications   Medication Sig Dispense Refill    folic acid (FOLVITE) 1 MG Tab TAKE 1 TABLET DAILY. START 5 TO 7 DAYS PRIOR TO FIRST CYCLE OF PEMETREXED 90 Tablet 3    Zinc Citrate (ZINC EXTRA STRENGTH PO) Take 1 Tablet by mouth every day.      Multiple Vitamin (MULTIVITAMINS PO) Take 1 Tablet by mouth every day.      PSYLLIUM PO Take 5 Capsules by mouth 1 time a day as needed.      hydroCHLOROthiazide (HYDRODIURIL) 50 MG Tab Take 50 mg by mouth 2 times a day.       No current facility-administered medications for this visit.     Past Surgical History:   Procedure Laterality Date    THORACOTOMY Right 02/01/2023    Procedure: RIGHT  THORACOTOMY, MIDDLE AND LOWER LOBECTOMY, NODE DISSECTION;  Surgeon: John H Ganser, M.D.;  Location: [facility];  Service: General    NODE DISSECTION Right 02/01/2023    Procedure: LYMPHADENECTOMY;  Surgeon: John H Ganser, M.D.;  Location: [facility];  Service: General    DE BRONCHOSCOPY,DIAGNOSTIC N/A 09/01/2022    Procedure: FIBER OPTIC BRONCHOSCOPY WITH  BRUSH, BIOPSY, FINE NEEDLE ASPIRATION AND ENDOBRONCHIAL ULTRASOUND;  Surgeon: Adam Rahman M.D.;  Location: [facility];  Service: Pulmonary    ENDOBRONCHIAL US ADD-ON  09/01/2022    Procedure: ENDOBRONCHIAL ULTRASOUND (EBUS);  Surgeon: Adam Rahman M.D.;  Location: [facility];  Service: Pulmonary    ARTHROSCOPY, KNEE Right 2006    acl    JOCELINE BY LAPAROSCOPY  2002    scar tissue removed 2 years later (2004)    OTHER Left 1984    Embrionic carcinoma, testicle removed    WEDGE RESECTION LUNG Right 1984    toracotomy with wedge resection    MASS EXCISION GENERAL Left 1984    Mass removed left kidney    TONSILLECTOMY  1972    HYDROCELECTOMY ADULT Right     OTHER ABDOMINAL SURGERY  12/1984    Lymphadenectomy and tumor removal    OTHER ORTHOPEDIC SURGERY  2006    Right ACL replacement      Social History     Socioeconomic History    Marital status:      Spouse name: Not on file    Number of children: Not on file    Years of education: Not on file    Highest education level: Master's degree (e.g., MA, MS, Melissa, MEd, MSW, ENIO)   Occupational History    Occupation: leyla     Employer: [employer]   Tobacco Use    Smoking status: Never     Passive exposure: Past    Smokeless tobacco: Never    Tobacco comments:     Both of my parents smoked cigarettes   Vaping Use    Vaping Use: Never used   Substance and Sexual Activity    Alcohol use: Not Currently     Comment: Have been on the [facility] [facility] for 30+ years    Drug use: Not Currently     Types: Oral     Comment: No drugs done in over 30 years    Sexual activity: Yes      Partners: Female     Comment: No need. Abdominal lymphadenectomy took care of that.   Other Topics Concern    Not on file   Social History Narrative    1 adopted child.     Social Determinants of Health     Financial Resource Strain: Low Risk  (2023)    Overall Financial Resource Strain (CARDIA)     Difficulty of Paying Living Expenses: Not hard at all   Food Insecurity: No Food Insecurity (2023)    Hunger Vital Sign     Worried About Running Out of Food in the Last Year: Never true     Ran Out of Food in the Last Year: Never true   Transportation Needs: No Transportation Needs (2023)    PRAPARE - Transportation     Lack of Transportation (Medical): No     Lack of Transportation (Non-Medical): No   Physical Activity: Unknown (2023)    Exercise Vital Sign     Days of Exercise per Week: 0 days     Minutes of Exercise per Session: Patient refused   Stress: No Stress Concern Present (2023)    [employer] - Occupational Stress Questionnaire     Feeling of Stress : Not at all   Social Connections: Moderately Isolated (2023)    Social Connection and Isolation Panel [NHANES]     Frequency of Communication with Friends and Family: Never     Frequency of Social Gatherings with Friends and Family: Never     Attends Latter-day Services: More than 4 times per year     Active Member of Clubs or Organizations: No     Attends Club or Organization Meetings: Patient refused     Marital Status:    Intimate Partner Violence: Not on file   Housing Stability: Low Risk  (2023)    Housing Stability Vital Sign     Unable to Pay for Housing in the Last Year: No     Number of Places Lived in the Last Year: 1     Unstable Housing in the Last Year: No     Family History   Problem Relation Age of Onset    Heart Disease Mother         MI age 78    Cancer Father         skin cancer    Hypertension Father     Stroke Father     Diabetes Father             Diabetes Maternal Aunt   "   Diabetes Maternal Uncle     Diabetes Maternal Grandmother     Stroke Maternal Grandmother         stroke age 35    Heart Disease Paternal Grandmother         MI after giving birth    Heart Disease Paternal Grandfather         MI    Ovarian Cancer Neg Hx     Tubal Cancer Neg Hx     Peritoneal Cancer Neg Hx     Colorectal Cancer Neg Hx     Breast Cancer Neg Hx          ROS:     Constitutional:  Negative for chills, fever, fatigue, weight loss.  HEENT:  Negative for blurred vision, hearing loss, sore throat.    Respiratory:  Negative for cough, sputum production and shortness of breath.  Cardiovascular:  Negative for chest pain, palpitations and leg swelling.  Gastrointestinal:  Negative for abdominal pain, blood in stool, constipation, diarrhea and vomiting.   Musculoskeletal:  Positive for pain in right shoulder but negative for back pain, falls, and neck pain.   Skin:  Negative for rash.   Neurological:  Negative for dizziness, seizures, weakness and headaches.   Endo/Heme/Allergies:  Does not bruise/bleed easily.   Psychiatric/Behavioral:  Negative for depression, anxiety and suicidal thoughts.      Objective:     Exam: /80   Pulse 71   Temp 37 °C (98.6 °F) (Temporal)   Ht 1.93 m (6' 4\")   Wt (!) 128 kg (283 lb)   SpO2 97%  Body mass index is 34.45 kg/m².    Gen: Alert and oriented, no acute distress.  Eyes:  PERRL, conjunctivae clear, lids normal.   ENMT: Lips without lesions, good dentition, moist mucous membranes.  Neck: Neck is supple, trachea middle, no palpable lymphadenopathy or thyromegaly.  Lungs: Normal effort, CTAB, no wheezing / rhonchi / rales.  CV: RRR, normal S1 and S2, no murmurs.  GI:  Abdomen soft, non-tender, non-distended with normal bowel sounds.  MSK:  Normal ROM.  Ext: No clubbing, cyanosis, or edema.  Skin:  Warm and dry with scar on abdomen but no rashes or lesions.  Neuro: AAO x 3, no acute focal deficits.  Psych: Normal affect and mood.      Assessment & Plan:   57 y.o. male " with the following -    1. Encounter to establish care  Patient presents today to establish care.  Chart was reviewed and history was discussed in detail with the patient.  Recent labs were reviewed.  Patient declined all vaccines.    2. Elevated fasting glucose  Chronic.  Multiple elevated fasting glucose readings in the past.  POC A1c 5.8 today.  No further work-up needed.  - POCT Hemoglobin A1C    3. Acute pain of right shoulder  Acute.  X-ray ordered for further evaluation.  Consider referral to PT or Ortho based on the results.  - DX-SHOULDER 2+ RIGHT; Future    4. Dyslipidemia  Chronic, uncontrolled.  September 2021  and HDL 38 with normal TC and TG.  Repeat lipid panel ordered.  - Lipid Profile; Future    5. Adenocarcinoma of right lung (HCC)  Chronic, in remission.  Patient underwent chemo and surgery.  Follows up with oncology.    6. Right inguinal hernia  Chronic.  This was recently found on CT and patient has upcoming appointment with surgery.    7. History of testicular cancer  Chronic.  Patient underwent orchiectomy and lymphadenectomy.  Continue hydrochlorothiazide 50 mg twice daily.    8. Colon cancer screening  - COLOGUARD COLON CANCER SCREENING    9. Encounter for screening for HIV  - HIV AG/AB COMBO ASSAY SCREENING; Future    10. Need for hepatitis C screening test  - HEP C VIRUS ANTIBODY; Future          I spent a total of 40 minutes with record review, exam, communication with the patient, communication with other providers, and documentation of this encounter.    Return in about 1 year (around 7/12/2024) for Annual preventive visit, Discuss labs.    Please note that this dictation was created using voice recognition software. I have made every reasonable attempt to correct obvious errors, but I expect that there are errors of grammar and possibly content that I did not discover before finalizing the note.

## 2023-07-13 ENCOUNTER — PATIENT MESSAGE (OUTPATIENT)
Dept: MEDICAL GROUP | Facility: PHYSICIAN GROUP | Age: 58
End: 2023-07-13
Payer: COMMERCIAL

## 2023-07-13 NOTE — ASSESSMENT & PLAN NOTE
Diagnosed September 2022.  Patient underwent chemo and surgery (right middle and lower lobe removed).  Follows up with oncology (Dr. Howe) and gets CT every 3 months.  
History of left testicle embryonic carcinoma.  Patient underwent orchiectomy, removal of mass from left kidney and lymphadenectomy in 1984.  Currently taking hydrochlorothiazide 50 mg twice daily.  
Patient reports history of severe osteoarthritis in his left shoulder for which she has received injections in the past.  Patient reports his right shoulder started hurting 1 month ago.  Patient reports Tylenol and ibuprofen help.  
Recent CT ordered by oncology showed small fat-containing right inguinal hernia and patient has appointment with surgery on 7/25.  
NOSE AND SORE THROAT/ear pain

## 2023-07-14 DIAGNOSIS — M19.011 PRIMARY OSTEOARTHRITIS OF RIGHT SHOULDER: ICD-10-CM

## 2023-07-14 RX ORDER — MELOXICAM 7.5 MG/1
7.5 TABLET ORAL
Qty: 30 TABLET | Refills: 1 | Status: SHIPPED | OUTPATIENT
Start: 2023-07-14 | End: 2023-08-14 | Stop reason: SDUPTHER

## 2023-07-22 ENCOUNTER — HOSPITAL ENCOUNTER (OUTPATIENT)
Dept: LAB | Facility: MEDICAL CENTER | Age: 58
End: 2023-07-22
Attending: STUDENT IN AN ORGANIZED HEALTH CARE EDUCATION/TRAINING PROGRAM
Payer: COMMERCIAL

## 2023-07-22 DIAGNOSIS — Z11.59 NEED FOR HEPATITIS C SCREENING TEST: ICD-10-CM

## 2023-07-22 DIAGNOSIS — E78.5 DYSLIPIDEMIA: ICD-10-CM

## 2023-07-22 DIAGNOSIS — Z11.4 ENCOUNTER FOR SCREENING FOR HIV: ICD-10-CM

## 2023-07-22 LAB
CHOLEST SERPL-MCNC: 161 MG/DL (ref 100–199)
HCV AB SER QL: NORMAL
HDLC SERPL-MCNC: 33 MG/DL
HIV 1+2 AB+HIV1 P24 AG SERPL QL IA: NORMAL
LDLC SERPL CALC-MCNC: 93 MG/DL
TRIGL SERPL-MCNC: 174 MG/DL (ref 0–149)

## 2023-07-22 PROCEDURE — 36415 COLL VENOUS BLD VENIPUNCTURE: CPT

## 2023-07-22 PROCEDURE — 80061 LIPID PANEL: CPT

## 2023-07-22 PROCEDURE — 86803 HEPATITIS C AB TEST: CPT

## 2023-07-22 PROCEDURE — 87389 HIV-1 AG W/HIV-1&-2 AB AG IA: CPT

## 2023-08-14 DIAGNOSIS — M19.011 PRIMARY OSTEOARTHRITIS OF RIGHT SHOULDER: ICD-10-CM

## 2023-08-14 NOTE — TELEPHONE ENCOUNTER
Received request via: Pharmacy    Was the patient seen in the last year in this department? Yes    Does the patient have an active prescription (recently filled or refills available) for medication(s) requested?  Pt requesting new 90 day prescripton from [facility].     Does the patient have residential Plus and need 100 day supply (blood pressure, diabetes and cholesterol meds only)? Patient does not have SCP

## 2023-08-16 RX ORDER — MELOXICAM 7.5 MG/1
7.5 TABLET ORAL
Qty: 90 TABLET | Refills: 0 | Status: SHIPPED | OUTPATIENT
Start: 2023-08-16 | End: 2023-11-02

## 2023-09-18 ENCOUNTER — HOSPITAL ENCOUNTER (OUTPATIENT)
Dept: RADIOLOGY | Facility: MEDICAL CENTER | Age: 58
End: 2023-09-18
Attending: STUDENT IN AN ORGANIZED HEALTH CARE EDUCATION/TRAINING PROGRAM
Payer: COMMERCIAL

## 2023-09-18 DIAGNOSIS — C34.90 ADENOCARCINOMA OF LUNG, UNSPECIFIED LATERALITY (HCC): ICD-10-CM

## 2023-09-18 PROCEDURE — 700117 HCHG RX CONTRAST REV CODE 255: Performed by: STUDENT IN AN ORGANIZED HEALTH CARE EDUCATION/TRAINING PROGRAM

## 2023-09-18 PROCEDURE — 71260 CT THORAX DX C+: CPT

## 2023-09-18 RX ADMIN — IOHEXOL 75 ML: 350 INJECTION, SOLUTION INTRAVENOUS at 09:39

## 2023-09-22 ENCOUNTER — HOSPITAL ENCOUNTER (OUTPATIENT)
Dept: HEMATOLOGY ONCOLOGY | Facility: MEDICAL CENTER | Age: 58
End: 2023-09-22
Attending: STUDENT IN AN ORGANIZED HEALTH CARE EDUCATION/TRAINING PROGRAM
Payer: COMMERCIAL

## 2023-09-22 VITALS
RESPIRATION RATE: 16 BRPM | HEIGHT: 76 IN | OXYGEN SATURATION: 97 % | DIASTOLIC BLOOD PRESSURE: 68 MMHG | TEMPERATURE: 98.1 F | WEIGHT: 287.7 LBS | BODY MASS INDEX: 35.03 KG/M2 | SYSTOLIC BLOOD PRESSURE: 116 MMHG | HEART RATE: 67 BPM

## 2023-09-22 DIAGNOSIS — C34.91 ADENOCARCINOMA OF RIGHT LUNG (HCC): ICD-10-CM

## 2023-09-22 PROCEDURE — 99212 OFFICE O/P EST SF 10 MIN: CPT | Performed by: STUDENT IN AN ORGANIZED HEALTH CARE EDUCATION/TRAINING PROGRAM

## 2023-09-22 PROCEDURE — 99214 OFFICE O/P EST MOD 30 MIN: CPT | Performed by: STUDENT IN AN ORGANIZED HEALTH CARE EDUCATION/TRAINING PROGRAM

## 2023-09-22 ASSESSMENT — ENCOUNTER SYMPTOMS
PALPITATIONS: 0
MEMORY LOSS: 0
SENSORY CHANGE: 0
TINGLING: 0
SORE THROAT: 0
SHORTNESS OF BREATH: 1
BLURRED VISION: 0
NECK PAIN: 0
HEADACHES: 0
WEIGHT LOSS: 0
VOMITING: 0
ROS GI COMMENTS: HEMORRHOID
DEPRESSION: 0
BRUISES/BLEEDS EASILY: 0
NAUSEA: 0
ABDOMINAL PAIN: 0
FOCAL WEAKNESS: 0
FEVER: 0
SPUTUM PRODUCTION: 0
DIZZINESS: 0
COUGH: 1
HEARTBURN: 0
ORTHOPNEA: 0
TREMORS: 0
WHEEZING: 0
CHILLS: 0

## 2023-09-22 ASSESSMENT — FIBROSIS 4 INDEX: FIB4 SCORE: 1.47

## 2023-09-22 NOTE — PROGRESS NOTES
Consult Note: Hematology/Oncology     Primary Care:  Yovany Packer M.D.    CC: Post care for newly diagnosed adenocarcinoma    Treatment history:  10/11/22: C1D1 Cisplatin, Pemetrexed and Nivolumab  11/01/22: C2D1 Carbo, Pemetrexed and Nivolumab (Cis changed to Carbo d/t tinnitus)  11/22/22: C3D1 Carbo, Pemetrexed and Nivolumab    2/1/2023: Lobectomy per Dr. Ganser    Prior Treatment: Surgery chemotherapy for metastatic nonseminomatous testicular cancer    Subjective:   History of Presenting Illness:  Babatunde Sullivan is a 57 y.o. male with a past medical history of metastatic testicular cancer treated with surgery and chemotherapy in 1984 presents today to establish care for a new diagnosis of right middle lobe adenocarcinoma.    Patient reports that he has had a year and a half of coughing and shortness of breath.  In March 2020 he had hemoptysis resumed from coughing vigorously.  Since that time he continued to cough.  In January 2021 he had COVID and had an exacerbation of his shortness of breath and cough.  He had a CT chest which noted a lung mass in his right middle lobe he had a bronchoscopy with biopsy which confirmed adenocarcinoma.    Regarding patient's testicular cancer, he was diagnosed at age 18 in 1984.  He had metastases to his left kidney and lung.  He had his testicle removed as well as his left kidney and a thoracotomy with a wedge resection.  He was noted to be DYLAN at that time.    Note patient father had melanoma but passed from sepsis.  There is no other family history of cancer.  The patient is a [employer] .  He denies smoking, he does not drink alcohol, and he denies IV drug use.  He lives with his wife and dog.  He has an adopted 23-year-old son who no longer lives at home.    Patient lives in [place] is [place] minutes east of [place].    He is now s/p 3 rounds of chemotherapy.     He is s/p lobectomy and was found to have residual tumor [id number]    Interval  "History    Patient reports that he is doing well.    He is starting to feel emotions and show them. His brain fog is getting a little better.     His wife is noticing that the \"old Babatunde\" is coming back.     Physically he has been noticing numbness in his arm, but he has a pinched nerve in his arm.     Cough has increased.  He is bringing up sputum.  His sputum is white.  He denies any sick contacts.  He is fatigued but this is stable.  No fevers, no HA.      Review his CT scan that was done on September 18.  There is a new nodule in the posterior right upper lobe measuring about 1 cm which appears to be directly associated with a small vascular structure.  Potentially it could be a mucous plug versus a solid lung mass.  Radiology recommended a PET scan.  There was no significant mediastinal or hilar lymphadenopathy.  This pleural effusion is improving.      Past Medical History:   Diagnosis Date    Allergy     Anesthesia     PONV    Arthritis     osteo-lower back    Back pain     Breath shortness     Bronchitis 03/2021    Bronchitis approx once a year    Cancer (Formerly [facility]) 1984    testicular    Cancer (Formerly [facility]) 09/2022    Lung CA    Carcinoma in situ of respiratory system     Chickenpox     Cough     Heart burn     History of blood transfusion 1984    Influenza     H/O    Obesity     PONV (postoperative nausea and vomiting)     Ringing in ears     Tonsillitis     H/O    Wears glasses     Wheezing         Past Surgical History:   Procedure Laterality Date    THORACOTOMY Right 02/01/2023    Procedure: RIGHT THORACOTOMY, MIDDLE AND LOWER LOBECTOMY, NODE DISSECTION;  Surgeon: John H Ganser, M.D.;  Location: SURGERY [facility];  Service: General    NODE DISSECTION Right 02/01/2023    Procedure: LYMPHADENECTOMY;  Surgeon: John H Ganser, M.D.;  Location: SURGERY [facility];  Service: General    DC BRONCHOSCOPY,DIAGNOSTIC N/A 09/01/2022    Procedure: FIBER OPTIC BRONCHOSCOPY WITH  BRUSH, BIOPSY, FINE NEEDLE ASPIRATION AND " ENDOBRONCHIAL ULTRASOUND;  Surgeon: Adam Rahman M.D.;  Location: SURGERY [facility];  Service: Pulmonary    ENDOBRONCHIAL US ADD-ON  2022    Procedure: ENDOBRONCHIAL ULTRASOUND (EBUS);  Surgeon: Adam Rahman M.D.;  Location: SURGERY [facility];  Service: Pulmonary    ARTHROSCOPY, KNEE Right 2006    acl    JOCELINE BY LAPAROSCOPY      scar tissue removed 2 years later ()    OTHER Left     Embrionic carcinoma, testicle removed    WEDGE RESECTION LUNG Right     toracotomy with wedge resection    MASS EXCISION GENERAL Left     Mass removed left kidney    TONSILLECTOMY  1972    HYDROCELECTOMY ADULT Right     OTHER ABDOMINAL SURGERY  1984    Lymphadenectomy and tumor removal    OTHER ORTHOPEDIC SURGERY      Right ACL replacement       Social History     Tobacco Use    Smoking status: Never     Passive exposure: Past    Smokeless tobacco: Never    Tobacco comments:     Both of my parents smoked cigarettes   Vaping Use    Vaping Use: Never used   Substance Use Topics    Alcohol use: Not Currently     Comment: Have been on the [employer] for 30+ years    Drug use: Not Currently     Types: Oral     Comment: No drugs done in over 30 years        Family History   Problem Relation Age of Onset    Heart Disease Mother         MI age 78    Cancer Father         skin cancer    Hypertension Father     Stroke Father     Diabetes Father             Diabetes Maternal Aunt     Diabetes Maternal Uncle     Diabetes Maternal Grandmother     Stroke Maternal Grandmother         stroke age 35    Heart Disease Paternal Grandmother         MI after giving birth    Heart Disease Paternal Grandfather         MI    Ovarian Cancer Neg Hx     Tubal Cancer Neg Hx     Peritoneal Cancer Neg Hx     Colorectal Cancer Neg Hx     Breast Cancer Neg Hx        Allergies   Allergen Reactions    Mefoxin Swelling     .       Current Outpatient Medications   Medication Sig Dispense Refill    meloxicam (MOBIC)  "7.5 MG Tab Take 1 Tablet by mouth 1 time a day as needed for Moderate Pain or Severe Pain. 90 Tablet 0    folic acid (FOLVITE) 1 MG Tab TAKE 1 TABLET DAILY. START 5 TO 7 DAYS PRIOR TO FIRST CYCLE OF PEMETREXED 90 Tablet 3    Zinc Citrate (ZINC EXTRA STRENGTH PO) Take 1 Tablet by mouth every day.      Multiple Vitamin (MULTIVITAMINS PO) Take 1 Tablet by mouth every day.      PSYLLIUM PO Take 5 Capsules by mouth 1 time a day as needed.      hydroCHLOROthiazide (HYDRODIURIL) 50 MG Tab Take 50 mg by mouth 2 times a day.       No current facility-administered medications for this encounter.       Review of Systems   Constitutional:  Negative for chills, fever, malaise/fatigue and weight loss.   HENT:  Negative for congestion, ear pain, nosebleeds, sore throat and tinnitus.    Eyes:  Negative for blurred vision.   Respiratory:  Positive for cough and shortness of breath. Negative for sputum production and wheezing.    Cardiovascular:  Negative for chest pain, palpitations, orthopnea and leg swelling.   Gastrointestinal:  Negative for abdominal pain, heartburn, nausea and vomiting.        Hemorrhoid   Genitourinary:  Negative for dysuria, frequency and urgency.   Musculoskeletal:  Positive for joint pain (rib pain). Negative for neck pain.   Neurological:  Negative for dizziness, tingling, tremors, sensory change, focal weakness and headaches.   Endo/Heme/Allergies:  Does not bruise/bleed easily.   Psychiatric/Behavioral:  Negative for depression, memory loss and suicidal ideas.    All other systems reviewed and are negative.      Problem list, medications, and allergies reviewed by myself today in Epic.     Objective:     Vitals:    09/22/23 0940   BP: 116/68   BP Location: Right arm   Patient Position: Sitting   BP Cuff Size: Adult   Pulse: 67   Resp: 16   Temp: 36.7 °C (98.1 °F)   TempSrc: Temporal   SpO2: 97%   Weight: (!) 130 kg (287 lb 11.2 oz)   Height: 1.93 m (6' 4\")           DESC; KARNOFSKY SCALE WITH ECOG " EQUIVALENT: 100, Fully active, able to carry on all pre-disease performed without restriction (ECOG equivalent 0)    DISTRESS LEVEL: no apparent distress    Physical Exam  Constitutional:       General: He is not in acute distress.     Appearance: Normal appearance.   HENT:      Head: Normocephalic and atraumatic.      Nose: Nose normal. No congestion.      Mouth/Throat:      Mouth: Mucous membranes are moist.      Pharynx: Oropharynx is clear.   Eyes:      General: No scleral icterus.     Conjunctiva/sclera: Conjunctivae normal.      Pupils: Pupils are equal, round, and reactive to light.   Cardiovascular:      Rate and Rhythm: Normal rate and regular rhythm.      Pulses: Normal pulses.      Heart sounds: No murmur heard.     No friction rub.   Pulmonary:      Effort: Pulmonary effort is normal. No respiratory distress.      Breath sounds: Normal breath sounds. No stridor. No wheezing or rales.   Chest:      Chest wall: No tenderness.   Abdominal:      General: Abdomen is flat. Bowel sounds are normal. There is no distension.      Palpations: Abdomen is soft. There is no mass.      Tenderness: There is no abdominal tenderness. There is no guarding or rebound.   Musculoskeletal:         General: No swelling, tenderness or deformity. Normal range of motion.      Cervical back: Normal range of motion and neck supple. No rigidity or tenderness.      Right lower leg: No edema.      Left lower leg: No edema.   Skin:     General: Skin is warm.      Coloration: Skin is not jaundiced or pale.      Findings: No bruising or rash.   Neurological:      General: No focal deficit present.      Mental Status: He is alert and oriented to person, place, and time. Mental status is at baseline.      Motor: No weakness.   Psychiatric:         Mood and Affect: Mood normal.         Behavior: Behavior normal.         Thought Content: Thought content normal.         Judgment: Judgment normal.           Labs:   Most recent labs reviewed.   Please see the lab tab of chart review    Imaging:   Most recent images below have been independently reviewed by me.      Chest  CT Chest 9/18/23  1.  There is a new ovoid nodule in the posterior right upper lobe measuring 9 x 9 mm which appears to be directly associated with small vascular structure and potentially a small airway. Potentially this could be mucus plugging versus solid lung nodule.   PET/CT may be of further benefit.  2.  No significant mediastinal or hilar lymphadenopathy.  3.  There are otherwise stable postoperative changes of right middle lobe and lower lobectomy with parenchymal areas of residual scarring.  4.  Stable benign 4 mm left lower lobe nodule abutting the fissure.  5.  A small amount of right pleural effusion remains, decreased from prior study.      CT Ches 5/8/23  IMPRESSION:     Interval right mid and lower lobectomy with no evidence of local recurrence     Enlarging right paratracheal lymph node could be reactive although malignant origin cannot be excluded and attention in follow-up is advised     Small-moderate right pleural effusion is new and nonspecific.      Mediastinum/Amy: There is a right hilar mass/lymphadenopathy with some mass effect on the right anterolateral lower lobe bronchus and encasement of the descending pulmonary artery. Due to postobstructive airspace disease in the right middle   lobe/anterior segment right lower lobe is difficult to obtain an accurate measurement of the suspected masslike area. The AP diameter is estimated at 3.4 cm.    PET scan:  1.  Right hilar mass is again identified which is difficult to distinguish from the consolidated and atelectatic lung distal to the mass extending out to the pleural surface. There are some focal areas of elevated activity within the mass as described   above. Maximum activity level is 8.6 SUV. Differential diagnosis does include lung carcinoma. Metastatic carcinoma is also possible. Inflammatory or infectious  lesion is also in the differential diagnosis. Noted calcifications are also present.     2.  The consolidated atelectatic lung surrounding the major fissure in the right middle lobe and right lower lobe, which possibly includes a small amount of fluid in the major fissure located distal to the mass appears decreased slightly in thickness and   volume compared to the prior chest CT.     3.  No mediastinal or left hilar adenopathy. No elevated activity within these nodes.     4.  Postsurgical changes noted in the abdomen and pelvis.     5.  No PET/CT evidence of metastatic disease in the neck abdomen or pelvis.       Pathology:  FINAL DIAGNOSIS:     A. Right middle lobe, forceps biopsy:          Positive for moderately differentiated non-small cell           adenocarcinoma.          Please see comment.   B. Right middle lobe, brushing brush head with touch prep slides:          Atypical cells present suspicious for malignancy.   C. Right lower lobe posterior segment brushing brush head with touch   prep slides:          Bronchial epithelial cells some with reactive changes, scattered           macrophages, lymphocytes, degenerated cells, mucus and blood.          Few rare atypical cells present which cannot be further           characterized.   D. 11R, fine needle aspiration slides:          Positive for malignant cells.   E. 11R, fine needle aspiration core:          Positive for moderately differentiated non-small cell           adenocarcinoma morphologically resembles the tumor in specimen           A.          No lymph node tissue identified in sections examined    Brain MRI : negative for malignancy     Assessment/Plan:      Cancer Staging   Adenocarcinoma, lung (HCC)  Staging form: Lung, AJCC 8th Edition  - Clinical stage from 9/15/2022: Stage IIB (cT2a, cN1, cM0) - Signed by Clementine Howe M.D. on 9/15/2022       Mr. Sullivan is a 57-year-old male with a new diagnosis of adenocarcinoma status post neoadjuvant  treatment (3 cycles of nivolumab carboplatin and pemetrexed) and a lobectomy (ypT3 ypN1) He is here for surveillance.    On CT chest, There is a suspicious right upper lobe measuring 9 x 9 mm    We discussed the pros and the cons of getting a PET scan to determine if this could be malignancy versus a close interval scan.    Plan:  CT contrast in 6 weeks (11/01/23)  -patient requires a note saying he can life more 10lbs  I will see patient afterwards to discuss results     Surveillance will consist of:    H&P and chest CT contrast every 3-6 mo for 3 y, then H&P and chest CT ± contrast every 6 mo for 2 y, then H&P and a low-dose non-contrast-enhanced chest CT annually    Any questions and concerns raised by the patient were addressed and answered. Patient denies any further questions.  Patient encouraged to call the office with any concerns or issues.       Clementine Hoew M.D.  Hematology/Oncology    31 minutes was spent on this visit

## 2023-09-22 NOTE — ADDENDUM NOTE
Encounter addended by: Pratibha Rogers, Med Ass't on: 9/22/2023 10:23 AM   Actions taken: Charge Capture section accepted

## 2023-10-28 DIAGNOSIS — M19.011 PRIMARY OSTEOARTHRITIS OF RIGHT SHOULDER: ICD-10-CM

## 2023-11-01 ENCOUNTER — HOSPITAL ENCOUNTER (OUTPATIENT)
Dept: RADIOLOGY | Facility: MEDICAL CENTER | Age: 58
End: 2023-11-01
Attending: STUDENT IN AN ORGANIZED HEALTH CARE EDUCATION/TRAINING PROGRAM
Payer: COMMERCIAL

## 2023-11-01 DIAGNOSIS — C34.91 ADENOCARCINOMA OF RIGHT LUNG (HCC): ICD-10-CM

## 2023-11-01 PROCEDURE — 71260 CT THORAX DX C+: CPT

## 2023-11-01 PROCEDURE — 700117 HCHG RX CONTRAST REV CODE 255: Performed by: STUDENT IN AN ORGANIZED HEALTH CARE EDUCATION/TRAINING PROGRAM

## 2023-11-01 RX ORDER — HYDROCHLOROTHIAZIDE 50 MG/1
50 TABLET ORAL 2 TIMES DAILY
Qty: 180 TABLET | Refills: 1 | Status: SHIPPED | OUTPATIENT
Start: 2023-11-01

## 2023-11-01 RX ADMIN — IOHEXOL 75 ML: 350 INJECTION, SOLUTION INTRAVENOUS at 11:12

## 2023-11-02 RX ORDER — MELOXICAM 7.5 MG/1
TABLET ORAL
Qty: 90 TABLET | Refills: 1 | Status: SHIPPED | OUTPATIENT
Start: 2023-11-02 | End: 2023-11-15

## 2023-11-03 ENCOUNTER — APPOINTMENT (OUTPATIENT)
Dept: HEMATOLOGY ONCOLOGY | Facility: MEDICAL CENTER | Age: 58
End: 2023-11-03
Payer: COMMERCIAL

## 2023-11-06 DIAGNOSIS — C34.91 ADENOCARCINOMA OF RIGHT LUNG (HCC): ICD-10-CM

## 2023-11-10 ENCOUNTER — OFFICE VISIT (OUTPATIENT)
Dept: SLEEP MEDICINE | Facility: MEDICAL CENTER | Age: 58
End: 2023-11-10
Attending: STUDENT IN AN ORGANIZED HEALTH CARE EDUCATION/TRAINING PROGRAM
Payer: COMMERCIAL

## 2023-11-10 ENCOUNTER — HOSPITAL ENCOUNTER (OUTPATIENT)
Dept: HEMATOLOGY ONCOLOGY | Facility: MEDICAL CENTER | Age: 58
End: 2023-11-10
Attending: STUDENT IN AN ORGANIZED HEALTH CARE EDUCATION/TRAINING PROGRAM
Payer: COMMERCIAL

## 2023-11-10 VITALS
HEIGHT: 76 IN | OXYGEN SATURATION: 93 % | SYSTOLIC BLOOD PRESSURE: 124 MMHG | DIASTOLIC BLOOD PRESSURE: 68 MMHG | WEIGHT: 286.93 LBS | HEART RATE: 102 BPM | TEMPERATURE: 99 F | BODY MASS INDEX: 34.94 KG/M2

## 2023-11-10 VITALS
BODY MASS INDEX: 34.83 KG/M2 | HEIGHT: 76 IN | WEIGHT: 286 LBS | OXYGEN SATURATION: 88 % | SYSTOLIC BLOOD PRESSURE: 104 MMHG | HEART RATE: 89 BPM | DIASTOLIC BLOOD PRESSURE: 68 MMHG

## 2023-11-10 DIAGNOSIS — C34.91 ADENOCARCINOMA OF RIGHT LUNG (HCC): ICD-10-CM

## 2023-11-10 DIAGNOSIS — R91.1 PULMONARY NODULE 1 CM OR GREATER IN DIAMETER: ICD-10-CM

## 2023-11-10 DIAGNOSIS — R91.8 LUNG MASS: ICD-10-CM

## 2023-11-10 DIAGNOSIS — K40.90 RIGHT INGUINAL HERNIA: ICD-10-CM

## 2023-11-10 DIAGNOSIS — R59.0 MEDIASTINAL ADENOPATHY: ICD-10-CM

## 2023-11-10 PROCEDURE — 99205 OFFICE O/P NEW HI 60 MIN: CPT | Performed by: INTERNAL MEDICINE

## 2023-11-10 PROCEDURE — 3074F SYST BP LT 130 MM HG: CPT | Performed by: INTERNAL MEDICINE

## 2023-11-10 PROCEDURE — 99212 OFFICE O/P EST SF 10 MIN: CPT | Performed by: STUDENT IN AN ORGANIZED HEALTH CARE EDUCATION/TRAINING PROGRAM

## 2023-11-10 PROCEDURE — 99213 OFFICE O/P EST LOW 20 MIN: CPT | Performed by: STUDENT IN AN ORGANIZED HEALTH CARE EDUCATION/TRAINING PROGRAM

## 2023-11-10 PROCEDURE — 3078F DIAST BP <80 MM HG: CPT | Performed by: INTERNAL MEDICINE

## 2023-11-10 ASSESSMENT — ENCOUNTER SYMPTOMS
MYALGIAS: 0
HEARTBURN: 0
HEADACHES: 0
PSYCHIATRIC NEGATIVE: 1
WHEEZING: 0
NAUSEA: 0
CHILLS: 0
SPUTUM PRODUCTION: 0
MEMORY LOSS: 0
LOSS OF CONSCIOUSNESS: 0
TREMORS: 0
EYE DISCHARGE: 0
DIZZINESS: 0
WEIGHT LOSS: 0
WHEEZING: 0
ABDOMINAL PAIN: 0
SORE THROAT: 0
SHORTNESS OF BREATH: 0
SINUS PAIN: 0
TINGLING: 0
DIZZINESS: 0
DEPRESSION: 0
SPUTUM PRODUCTION: 0
ORTHOPNEA: 0
ABDOMINAL PAIN: 0
FOCAL WEAKNESS: 0
STRIDOR: 0
HEADACHES: 0
COUGH: 0
PALPITATIONS: 0
ROS GI COMMENTS: HEMORRHOID
COUGH: 1
VOMITING: 0
ORTHOPNEA: 0
SENSORY CHANGE: 0
CHILLS: 0
FOCAL WEAKNESS: 0
BRUISES/BLEEDS EASILY: 0
NAUSEA: 0
WEIGHT LOSS: 0
BLURRED VISION: 0
EYE PAIN: 0
FEVER: 0
SHORTNESS OF BREATH: 0
NECK PAIN: 1
SENSORY CHANGE: 0
VOMITING: 0
SORE THROAT: 0
FEVER: 0
DIARRHEA: 0

## 2023-11-10 ASSESSMENT — FIBROSIS 4 INDEX
FIB4 SCORE: 1.47
FIB4 SCORE: 1.47

## 2023-11-10 NOTE — ADDENDUM NOTE
Encounter addended by: Anuj Mccullough on: 11/10/2023 12:05 PM   Actions taken: Charge Capture section accepted

## 2023-11-10 NOTE — PROGRESS NOTES
Consult Note: Hematology/Oncology     Primary Care:  Yovany Packer M.D.    CC: Post care for newly diagnosed adenocarcinoma    Treatment history:  10/11/22: C1D1 Cisplatin, Pemetrexed and Nivolumab  11/01/22: C2D1 Carbo, Pemetrexed and Nivolumab (Cis changed to Carbo d/t tinnitus)  11/22/22: C3D1 Carbo, Pemetrexed and Nivolumab    2/1/2023: Lobectomy per Dr. Ganser    Prior Treatment: Surgery chemotherapy for metastatic nonseminomatous testicular cancer    Subjective:   History of Presenting Illness:  Babatunde Sullivan is a 57 y.o. male with a past medical history of metastatic testicular cancer treated with surgery and chemotherapy in 1984 presents today to establish care for a new diagnosis of right middle lobe adenocarcinoma.    Patient reports that he has had a year and a half of coughing and shortness of breath.  In March 2020 he had hemoptysis resumed from coughing vigorously.  Since that time he continued to cough.  In January 2021 he had COVID and had an exacerbation of his shortness of breath and cough.  He had a CT chest which noted a lung mass in his right middle lobe he had a bronchoscopy with biopsy which confirmed adenocarcinoma.    Regarding patient's testicular cancer, he was diagnosed at age 18 in 1984.  He had metastases to his left kidney and lung.  He had his testicle removed as well as his left kidney and a thoracotomy with a wedge resection.  He was noted to be DYLAN at that time.    Note patient father had melanoma but passed from sepsis.  There is no other family history of cancer.  The patient is a [employer] .  He denies smoking, he does not drink alcohol, and he denies IV drug use.  He lives with his wife and dog.  He has an adopted 23-year-old son who no longer lives at home.    Patient lives in [place] is [place] minutes east of [place].    He is now s/p 3 rounds of chemotherapy.     He is s/p lobectomy and was found to have residual tumor [id number]    CT scan that was done on  September 18 2023.  There is a new nodule in the posterior right upper lobe measuring about 1 cm which appears to be directly associated with a small vascular structure.      Interval History    Most recent CT scan shows mass increasing in his RUL.      No worsening SOB, no hemoptysis. No weight loss.  No increase in cough (but has the baseline cough that has not improved).      Past Medical History:   Diagnosis Date    Allergy     Anesthesia     PONV    Arthritis     osteo-lower back    Back pain     Breath shortness     Bronchitis 03/2021    Bronchitis approx once a year    Cancer (HCC) 1984    testicular    Cancer (HCC) 09/2022    Lung CA    Carcinoma in situ of respiratory system     Chickenpox     Cough     Heart burn     History of blood transfusion 1984    Influenza     H/O    Obesity     PONV (postoperative nausea and vomiting)     Ringing in ears     Tonsillitis     H/O    Wears glasses     Wheezing         Past Surgical History:   Procedure Laterality Date    THORACOTOMY Right 02/01/2023    Procedure: RIGHT THORACOTOMY, MIDDLE AND LOWER LOBECTOMY, NODE DISSECTION;  Surgeon: John H Ganser, M.D.;  Location: [facility];  Service: General    NODE DISSECTION Right 02/01/2023    Procedure: LYMPHADENECTOMY;  Surgeon: John H Ganser, M.D.;  Location: [facility];  Service: General    AL BRONCHOSCOPY,DIAGNOSTIC N/A 09/01/2022    Procedure: FIBER OPTIC BRONCHOSCOPY WITH  BRUSH, BIOPSY, FINE NEEDLE ASPIRATION AND ENDOBRONCHIAL ULTRASOUND;  Surgeon: Adam Rahman M.D.;  Location: [facility];  Service: Pulmonary    ENDOBRONCHIAL US ADD-ON  09/01/2022    Procedure: ENDOBRONCHIAL ULTRASOUND (EBUS);  Surgeon: Adam Rahman M.D.;  Location: [facility];  Service: Pulmonary    ARTHROSCOPY, KNEE Right 2006    acl    JOCELINE BY LAPAROSCOPY  2002    scar tissue removed 2 years later (2004)    OTHER Left 1984    Embrionic carcinoma, testicle removed    WEDGE RESECTION LUNG Right      toracotomy with wedge resection    MASS EXCISION GENERAL Left 1984    Mass removed left kidney    TONSILLECTOMY  1972    HYDROCELECTOMY ADULT Right     OTHER ABDOMINAL SURGERY  1984    Lymphadenectomy and tumor removal    OTHER ORTHOPEDIC SURGERY  2006    Right ACL replacement       Social History     Tobacco Use    Smoking status: Never     Passive exposure: Past    Smokeless tobacco: Never    Tobacco comments:     Both of my parents smoked cigarettes   Vaping Use    Vaping Use: Never used   Substance Use Topics    Alcohol use: Not Currently     Comment: Have been on the [employer] for 30+ years    Drug use: Not Currently     Types: Oral     Comment: No drugs done in over 30 years        Family History   Problem Relation Age of Onset    Heart Disease Mother         MI age 78    Cancer Father         skin cancer    Hypertension Father     Stroke Father     Diabetes Father             Diabetes Maternal Aunt     Diabetes Maternal Uncle     Diabetes Maternal Grandmother     Stroke Maternal Grandmother         stroke age 35    Heart Disease Paternal Grandmother         MI after giving birth    Heart Disease Paternal Grandfather         MI    Ovarian Cancer Neg Hx     Tubal Cancer Neg Hx     Peritoneal Cancer Neg Hx     Colorectal Cancer Neg Hx     Breast Cancer Neg Hx        Allergies   Allergen Reactions    Mefoxin Swelling     .       Current Outpatient Medications   Medication Sig Dispense Refill    meloxicam (MOBIC) 7.5 MG Tab TAKE 1 TABLET DAILY AS NEEDED FOR MODERATE OR SEVERE PAIN 90 Tablet 1    hydroCHLOROthiazide 50 MG Tab Take 1 Tablet by mouth 2 times a day. 180 Tablet 1    Zinc Citrate (ZINC EXTRA STRENGTH PO) Take 1 Tablet by mouth every day.      Multiple Vitamin (MULTIVITAMINS PO) Take 1 Tablet by mouth every day.      PSYLLIUM PO Take 5 Capsules by mouth 1 time a day as needed.      folic acid (FOLVITE) 1 MG Tab TAKE 1 TABLET DAILY. START 5 TO 7 DAYS PRIOR TO FIRST CYCLE OF PEMETREXED 90  "Tablet 3     No current facility-administered medications for this encounter.       Review of Systems   Constitutional:  Negative for chills, fever, malaise/fatigue and weight loss.   HENT:  Negative for congestion, ear pain, nosebleeds, sore throat and tinnitus.    Eyes:  Negative for blurred vision.   Respiratory:  Positive for cough. Negative for sputum production, shortness of breath and wheezing.    Cardiovascular:  Negative for chest pain, palpitations, orthopnea and leg swelling.   Gastrointestinal:  Negative for abdominal pain, heartburn, nausea and vomiting.        Hemorrhoid   Genitourinary:  Negative for dysuria, frequency and urgency.   Musculoskeletal:  Positive for joint pain (rib pain) and neck pain (pinched nerve).   Neurological:  Negative for dizziness, tingling, tremors, sensory change, focal weakness and headaches.   Endo/Heme/Allergies:  Does not bruise/bleed easily.   Psychiatric/Behavioral:  Negative for depression, memory loss and suicidal ideas.    All other systems reviewed and are negative.      Problem list, medications, and allergies reviewed by myself today in Epic.     Objective:     Vitals:    11/10/23 1128   BP: 124/68   BP Location: Right arm   Patient Position: Sitting   BP Cuff Size: Adult   Pulse: (!) 102   Temp: 37.2 °C (99 °F)   TempSrc: Temporal   SpO2: 93%   Weight: (!) 130 kg (286 lb 14.9 oz)   Height: 1.93 m (6' 3.98\")           DESC; KARNOFSKY SCALE WITH ECOG EQUIVALENT: 100, Fully active, able to carry on all pre-disease performed without restriction (ECOG equivalent 0)    DISTRESS LEVEL: no apparent distress    Physical Exam  Constitutional:       General: He is not in acute distress.     Appearance: Normal appearance.   HENT:      Head: Normocephalic and atraumatic.      Nose: Nose normal. No congestion.      Mouth/Throat:      Mouth: Mucous membranes are moist.      Pharynx: Oropharynx is clear.   Eyes:      General: No scleral icterus.     Conjunctiva/sclera: " Conjunctivae normal.      Pupils: Pupils are equal, round, and reactive to light.   Cardiovascular:      Rate and Rhythm: Normal rate and regular rhythm.      Pulses: Normal pulses.      Heart sounds: No murmur heard.     No friction rub.   Pulmonary:      Effort: Pulmonary effort is normal. No respiratory distress.      Breath sounds: Normal breath sounds. No stridor. No wheezing or rales.   Chest:      Chest wall: No tenderness.   Abdominal:      General: Abdomen is flat. Bowel sounds are normal. There is no distension.      Palpations: Abdomen is soft. There is no mass.      Tenderness: There is no abdominal tenderness. There is no guarding or rebound.   Musculoskeletal:         General: No swelling, tenderness or deformity. Normal range of motion.      Cervical back: Normal range of motion and neck supple. No rigidity or tenderness.      Right lower leg: No edema.      Left lower leg: No edema.   Skin:     General: Skin is warm.      Coloration: Skin is not jaundiced or pale.      Findings: No bruising or rash.   Neurological:      General: No focal deficit present.      Mental Status: He is alert and oriented to person, place, and time. Mental status is at baseline.      Motor: No weakness.   Psychiatric:         Mood and Affect: Mood normal.         Behavior: Behavior normal.         Thought Content: Thought content normal.         Judgment: Judgment normal.       Labs:   Most recent labs reviewed.  Please see the lab tab of chart review    Imaging:   Most recent images below have been independently reviewed by me.      Chest  CT Chest 9/18/23  1.  There is a new ovoid nodule in the posterior right upper lobe measuring 9 x 9 mm which appears to be directly associated with small vascular structure and potentially a small airway. Potentially this could be mucus plugging versus solid lung nodule.   PET/CT may be of further benefit.  2.  No significant mediastinal or hilar lymphadenopathy.  3.  There are otherwise  stable postoperative changes of right middle lobe and lower lobectomy with parenchymal areas of residual scarring.  4.  Stable benign 4 mm left lower lobe nodule abutting the fissure.  5.  A small amount of right pleural effusion remains, decreased from prior study.      CT Ches 5/8/23  IMPRESSION:     Interval right mid and lower lobectomy with no evidence of local recurrence     Enlarging right paratracheal lymph node could be reactive although malignant origin cannot be excluded and attention in follow-up is advised     Small-moderate right pleural effusion is new and nonspecific.      Mediastinum/Amy: There is a right hilar mass/lymphadenopathy with some mass effect on the right anterolateral lower lobe bronchus and encasement of the descending pulmonary artery. Due to postobstructive airspace disease in the right middle   lobe/anterior segment right lower lobe is difficult to obtain an accurate measurement of the suspected masslike area. The AP diameter is estimated at 3.4 cm.    PET scan:  1.  Right hilar mass is again identified which is difficult to distinguish from the consolidated and atelectatic lung distal to the mass extending out to the pleural surface. There are some focal areas of elevated activity within the mass as described   above. Maximum activity level is 8.6 SUV. Differential diagnosis does include lung carcinoma. Metastatic carcinoma is also possible. Inflammatory or infectious lesion is also in the differential diagnosis. Noted calcifications are also present.     2.  The consolidated atelectatic lung surrounding the major fissure in the right middle lobe and right lower lobe, which possibly includes a small amount of fluid in the major fissure located distal to the mass appears decreased slightly in thickness and   volume compared to the prior chest CT.     3.  No mediastinal or left hilar adenopathy. No elevated activity within these nodes.     4.  Postsurgical changes noted in the  abdomen and pelvis.     5.  No PET/CT evidence of metastatic disease in the neck abdomen or pelvis.       Pathology:  FINAL DIAGNOSIS:     A. Right middle lobe, forceps biopsy:          Positive for moderately differentiated non-small cell           adenocarcinoma.          Please see comment.   B. Right middle lobe, brushing brush head with touch prep slides:          Atypical cells present suspicious for malignancy.   C. Right lower lobe posterior segment brushing brush head with touch   prep slides:          Bronchial epithelial cells some with reactive changes, scattered           macrophages, lymphocytes, degenerated cells, mucus and blood.          Few rare atypical cells present which cannot be further           characterized.   D. 11R, fine needle aspiration slides:          Positive for malignant cells.   E. 11R, fine needle aspiration core:          Positive for moderately differentiated non-small cell           adenocarcinoma morphologically resembles the tumor in specimen           A.          No lymph node tissue identified in sections examined    Brain MRI : negative for malignancy     Assessment/Plan:      Cancer Staging   Adenocarcinoma, lung (HCC)  Staging form: Lung, AJCC 8th Edition  - Clinical stage from 9/15/2022: Stage IIB (cT2a, cN1, cM0) - Signed by Clementine Howe M.D. on 9/15/2022       Mr. Sullivan is a 57-year-old male with a new diagnosis of adenocarcinoma status post neoadjuvant treatment (3 cycles of nivolumab carboplatin and pemetrexed) and a lobectomy (ypT3 ypN1) He is here for surveillance.    On CT chest, There is a suspicious right upper lobe measuring 9 x 9 mm.  On most recent imaging, his mass has increased in size.     Plan:  -biopsy of the mass  -will see Dr. King in clinic today to discuss bronchoscopy   -will see patient after biopsy    Any questions and concerns raised by the patient were addressed and answered. Patient denies any further questions.  Patient encouraged  to call the office with any concerns or issues.       Clementine Howe M.D.  Hematology/Oncology    25 minutes was spent on this visit

## 2023-11-10 NOTE — PROGRESS NOTES
Pulmonary Clinic- Initial Consult    Date of Service: 11/10/2023    Referring Physician: Clementine Howe M.D.    Reason for Consult: Abnormal chest CT    Chief Complaint:   Chief Complaint   Patient presents with    Follow-Up     Lung mass. Last seen 08/04/22     HPI:   Babatunde Sullivan is a very pleasant 57 y.o. male with a past medical history of metastatic testicular cancer treated with surgery and chemotherapy and left nephrectomy and a thoracotomy with wedge resection in 1984, referred by Dr. Howe from the oncology clinic to establish care for new diagnosis of right middle lobe adenocarcinoma.  Symptoms began in 2020 developed with hemoptysis and a cough.  A CT chest in January 2021 noted lung mass is right middle lobe for which she underwent a bronchoscopy/biopsy by Dr. Rahman which confirmed adenocarcinoma.  He is now status post 3 rounds of chemotherapy and lobectomy by Dr. Ganser. Never smoker.    Surveillance scans since lobectomy were personally reviewed at time of consultation  CT chest 5/2023 demonstrated enlarging right paratracheal lymph node.  Subsequent PET/CT in 6/2023 showed no focal hypermetabolism and morphologically normal.  CT chest 9/2023 demonstrated a new ovoid nodule in the posterior right upper lobe measuring 9 x 9 mm.  Otherwise no significant mediastinal or hilar adenopathy.  A follow-up surveillance CT scan demonstrated interval increase in the right upper lobe nodule now measuring 14 mm in greatest dimension as well as a new right pleural effusion.    Symptomatically denies any cough, chest pain, shortness of breath or unexplained weight loss.    Past Medical History:   Diagnosis Date    Allergy     Anesthesia     PONV    Arthritis     osteo-lower back    Back pain     Breath shortness     Bronchitis 03/2021    Bronchitis approx once a year    Cancer (HCC) 1984    testicular    Cancer (HCC) 09/2022    Lung CA    Carcinoma in situ of respiratory system     Chickenpox     Cough      Heart burn     History of blood transfusion 1984    Influenza     H/O    Obesity     PONV (postoperative nausea and vomiting)     Ringing in ears     Tonsillitis     H/O    Wears glasses     Wheezing        Past Surgical History:   Procedure Laterality Date    THORACOTOMY Right 02/01/2023    Procedure: RIGHT THORACOTOMY, MIDDLE AND LOWER LOBECTOMY, NODE DISSECTION;  Surgeon: John H Ganser, M.D.;  Location: SURGERY [facility];  Service: General    NODE DISSECTION Right 02/01/2023    Procedure: LYMPHADENECTOMY;  Surgeon: John H Ganser, M.D.;  Location: SURGERY [facility];  Service: General    SD BRONCHOSCOPY,DIAGNOSTIC N/A 09/01/2022    Procedure: FIBER OPTIC BRONCHOSCOPY WITH  BRUSH, BIOPSY, FINE NEEDLE ASPIRATION AND ENDOBRONCHIAL ULTRASOUND;  Surgeon: Adam Rahman M.D.;  Location: SURGERY [facility];  Service: Pulmonary    ENDOBRONCHIAL US ADD-ON  09/01/2022    Procedure: ENDOBRONCHIAL ULTRASOUND (EBUS);  Surgeon: Adam Rahman M.D.;  Location: SURGERY [facility];  Service: Pulmonary    ARTHROSCOPY, KNEE Right 2006    acl    JOCELINE BY LAPAROSCOPY  2002    scar tissue removed 2 years later (2004)    OTHER Left 1984    Embrionic carcinoma, testicle removed    WEDGE RESECTION LUNG Right 1984    toracotomy with wedge resection    MASS EXCISION GENERAL Left 1984    Mass removed left kidney    TONSILLECTOMY  1972    HYDROCELECTOMY ADULT Right     OTHER ABDOMINAL SURGERY  12/1984    Lymphadenectomy and tumor removal    OTHER ORTHOPEDIC SURGERY  2006    Right ACL replacement       Social History     Socioeconomic History    Marital status:      Spouse name: Not on file    Number of children: Not on file    Years of education: Not on file    Highest education level: Master's degree (e.g., MA, MS, Melissa, MEd, MSW, ENIO)   Occupational History    Occupation: leyla     Employer: [employer]   Tobacco Use    Smoking status: Never     Passive exposure: Past    Smokeless tobacco: Never    Tobacco comments:      Both of my parents smoked cigarettes   Vaping Use    Vaping Use: Never used   Substance and Sexual Activity    Alcohol use: Not Currently     Comment: Have been on the [facility] for 30+ years    Drug use: Not Currently     Types: Oral     Comment: No drugs done in over 30 years    Sexual activity: Yes     Partners: Female     Comment: No need. Abdominal lymphadenectomy took care of that.   Other Topics Concern    Not on file   Social History Narrative    1 adopted child.     Social Determinants of Health     Financial Resource Strain: Low Risk  (7/9/2023)    Overall Financial Resource Strain (CARDIA)     Difficulty of Paying Living Expenses: Not hard at all   Food Insecurity: No Food Insecurity (7/9/2023)    Hunger Vital Sign     Worried About Running Out of Food in the Last Year: Never true     Ran Out of Food in the Last Year: Never true   Transportation Needs: No Transportation Needs (7/9/2023)    PRAPARE - Transportation     Lack of Transportation (Medical): No     Lack of Transportation (Non-Medical): No   Physical Activity: Unknown (7/9/2023)    Exercise Vital Sign     Days of Exercise per Week: 0 days     Minutes of Exercise per Session: Patient refused   Stress: No Stress Concern Present (7/9/2023)    [place] - Occupational Stress Questionnaire     Feeling of Stress : Not at all   Social Connections: Moderately Isolated (7/9/2023)    Social Connection and Isolation Panel [NHANES]     Frequency of Communication with Friends and Family: Never     Frequency of Social Gatherings with Friends and Family: Never     Attends Hoahaoism Services: More than 4 times per year     Active Member of Clubs or Organizations: No     Attends Club or Organization Meetings: Patient refused     Marital Status:    Intimate Partner Violence: Not on file   Housing Stability: Low Risk  (7/9/2023)    Housing Stability Vital Sign     Unable to Pay for Housing in the Last Year: No     Number of Places  Lived in the Last Year: 1     Unstable Housing in the Last Year: No          Family History   Problem Relation Age of Onset    Heart Disease Mother         MI age 78    Cancer Father         skin cancer    Hypertension Father     Stroke Father     Diabetes Father             Diabetes Maternal Aunt     Diabetes Maternal Uncle     Diabetes Maternal Grandmother     Stroke Maternal Grandmother         stroke age 35    Heart Disease Paternal Grandmother         MI after giving birth    Heart Disease Paternal Grandfather         MI    Ovarian Cancer Neg Hx     Tubal Cancer Neg Hx     Peritoneal Cancer Neg Hx     Colorectal Cancer Neg Hx     Breast Cancer Neg Hx        Current Outpatient Medications on File Prior to Visit   Medication Sig Dispense Refill    hydroCHLOROthiazide 50 MG Tab Take 1 Tablet by mouth 2 times a day. 180 Tablet 1    Zinc Citrate (ZINC EXTRA STRENGTH PO) Take 1 Tablet by mouth every day.      Multiple Vitamin (MULTIVITAMINS PO) Take 1 Tablet by mouth every day.      PSYLLIUM PO Take 5 Capsules by mouth 1 time a day as needed.      meloxicam (MOBIC) 7.5 MG Tab TAKE 1 TABLET DAILY AS NEEDED FOR MODERATE OR SEVERE PAIN (Patient not taking: Reported on 11/10/2023) 90 Tablet 1    folic acid (FOLVITE) 1 MG Tab TAKE 1 TABLET DAILY. START 5 TO 7 DAYS PRIOR TO FIRST CYCLE OF PEMETREXED 90 Tablet 3     No current facility-administered medications on file prior to visit.       Allergies: Mefoxin      ROS:   Review of Systems   Constitutional:  Negative for chills, fever and weight loss.   HENT:  Negative for congestion, sinus pain and sore throat.    Eyes:  Negative for pain and discharge.   Respiratory:  Negative for cough, sputum production, shortness of breath, wheezing and stridor.    Cardiovascular:  Negative for chest pain, orthopnea and leg swelling.   Gastrointestinal:  Negative for abdominal pain, diarrhea, nausea and vomiting.   Genitourinary:  Negative for dysuria, frequency and urgency.  "  Musculoskeletal:  Negative for myalgias.   Skin:  Negative for rash.   Neurological:  Negative for dizziness, sensory change, focal weakness, loss of consciousness and headaches.   Psychiatric/Behavioral: Negative.     All other systems reviewed and are negative.      Vitals:  /68 (BP Location: Left arm, Patient Position: Sitting, BP Cuff Size: Large adult)   Pulse 89   Ht 1.93 m (6' 4\")   Wt (!) 130 kg (286 lb)   SpO2 88%     Physical Exam:  Physical Exam  Vitals and nursing note reviewed.   Constitutional:       General: He is not in acute distress.     Appearance: Normal appearance. He is well-developed. He is not ill-appearing or diaphoretic.      Comments: Accompanied by supportive wife   HENT:      Head: Normocephalic.      Nose: Nose normal.      Mouth/Throat:      Pharynx: No oropharyngeal exudate.   Eyes:      General: No scleral icterus.        Right eye: No discharge.         Left eye: No discharge.      Conjunctiva/sclera: Conjunctivae normal.      Pupils: Pupils are equal, round, and reactive to light.   Neck:      Thyroid: No thyromegaly.      Vascular: No JVD.      Trachea: No tracheal deviation.   Cardiovascular:      Rate and Rhythm: Normal rate and regular rhythm.      Heart sounds: Normal heart sounds. No murmur heard.  Pulmonary:      Effort: Pulmonary effort is normal. No respiratory distress.      Breath sounds: Normal breath sounds. No stridor. No wheezing or rales.   Abdominal:      General: There is no distension.      Palpations: Abdomen is soft.      Tenderness: There is no abdominal tenderness. There is no guarding.   Musculoskeletal:         General: No tenderness. Normal range of motion.      Cervical back: Neck supple.   Lymphadenopathy:      Cervical: No cervical adenopathy.   Skin:     General: Skin is warm and dry.      Capillary Refill: Capillary refill takes less than 2 seconds.      Coloration: Skin is not pale.      Findings: No erythema.   Neurological:      Mental " Status: He is alert and oriented to person, place, and time.      Sensory: No sensory deficit.      Motor: No abnormal muscle tone.      Coordination: Coordination normal.      Deep Tendon Reflexes: Reflexes normal.   Psychiatric:         Behavior: Behavior normal.         Thought Content: Thought content normal.         Judgment: Judgment normal.       Laboratory Data:    PFTs as reviewed by me personally show:  See HPI    Imaging as reviewed by me personally show:    See HPI    Assessment/Plan:    Problem List Items Addressed This Visit       Pulmonary nodule 1 cm or greater in diameter     Metastatic testicular cancer treated with surgery, chemotherapy, left nephrectomy and thoracotomy with wedge resection in 1984    Right middle lobe adenocarcinoma diagnosed 2020, status post neoadjuvant chemotherapy and lobectomy    Surveillance scans since lobectomy demonstrating new ovoid nodule in the posterior right upper lobe with interval growth since 9/2023.    Risk/benefit/alternatives discussed with the patient.  Discussed continued radiographic surveillance versus IR percutaneous biopsy versus bronchoscopic biopsy of this lesion.  Through shared decision-making, he has agreed to pursue bronchoscopic biopsy with endobronchial ultrasound lymph node survey.  Tentatively plan for next week pending OR availability.  All questions answered.         Relevant Orders    Bronchoscopy    Mediastinal adenopathy    Relevant Orders    Bronchoscopy      This note was generated using voice recognition software which has a chance of producing errors of grammar and possibly content.  I have made every reasonable attempt to find and correct any obvious errors, but it should be expected that some may not be found prior to finalization of this note.    Time spent in record review prior to patient arrival, reviewing results, and in face-to-face encounter totaled 62 min, excluding any procedures if performed.  __________  Viktor King,  MD  Pulmonary and Critical Care Medicine  [facility]

## 2023-11-11 NOTE — ASSESSMENT & PLAN NOTE
Metastatic testicular cancer treated with surgery, chemotherapy, left nephrectomy and thoracotomy with wedge resection in 1984    Right middle lobe adenocarcinoma diagnosed 2020, status post neoadjuvant chemotherapy and lobectomy    Surveillance scans since lobectomy demonstrating new ovoid nodule in the posterior right upper lobe with interval growth since 9/2023.    Risk/benefit/alternatives discussed with the patient.  Discussed continued radiographic surveillance versus IR percutaneous biopsy versus bronchoscopic biopsy of this lesion.  Through shared decision-making, he has agreed to pursue bronchoscopic biopsy with endobronchial ultrasound lymph node survey.  Tentatively plan for next week pending OR availability.  All questions answered.

## 2023-11-15 ENCOUNTER — APPOINTMENT (OUTPATIENT)
Dept: ADMISSIONS | Facility: MEDICAL CENTER | Age: 58
End: 2023-11-15
Attending: STUDENT IN AN ORGANIZED HEALTH CARE EDUCATION/TRAINING PROGRAM
Payer: COMMERCIAL

## 2023-11-15 VITALS — BODY MASS INDEX: 34.81 KG/M2 | HEIGHT: 76 IN

## 2023-11-15 RX ORDER — ACETAMINOPHEN 500 MG
500-1000 TABLET ORAL EVERY 6 HOURS PRN
COMMUNITY

## 2023-11-15 NOTE — PREPROCEDURE INSTRUCTIONS
"Pre-admit appointment completed.  \"Preparing for your procedure\" reviewed with pt along with verbal and written instructions.  Patient instructed per pharmacy guidelines regarding taking or holding regularly prescribed medications before surgery.  Instructed to take the following medications the day of surgery with a sip of water per pharmacy medication guidelines:  tylenol  "

## 2023-11-16 ENCOUNTER — ANESTHESIA EVENT (OUTPATIENT)
Dept: SURGERY | Facility: MEDICAL CENTER | Age: 58
End: 2023-11-16
Payer: COMMERCIAL

## 2023-11-17 ENCOUNTER — APPOINTMENT (OUTPATIENT)
Dept: RADIOLOGY | Facility: MEDICAL CENTER | Age: 58
End: 2023-11-17
Attending: STUDENT IN AN ORGANIZED HEALTH CARE EDUCATION/TRAINING PROGRAM
Payer: COMMERCIAL

## 2023-11-17 ENCOUNTER — HOSPITAL ENCOUNTER (OUTPATIENT)
Facility: MEDICAL CENTER | Age: 58
End: 2023-11-17
Attending: STUDENT IN AN ORGANIZED HEALTH CARE EDUCATION/TRAINING PROGRAM | Admitting: STUDENT IN AN ORGANIZED HEALTH CARE EDUCATION/TRAINING PROGRAM
Payer: COMMERCIAL

## 2023-11-17 ENCOUNTER — ANESTHESIA (OUTPATIENT)
Dept: SURGERY | Facility: MEDICAL CENTER | Age: 58
End: 2023-11-17
Payer: COMMERCIAL

## 2023-11-17 VITALS
BODY MASS INDEX: 34.63 KG/M2 | DIASTOLIC BLOOD PRESSURE: 86 MMHG | HEART RATE: 68 BPM | OXYGEN SATURATION: 92 % | RESPIRATION RATE: 17 BRPM | TEMPERATURE: 97.2 F | SYSTOLIC BLOOD PRESSURE: 140 MMHG | WEIGHT: 284.5 LBS

## 2023-11-17 DIAGNOSIS — R09.89 ABNORMAL FINDING OF LUNG: ICD-10-CM

## 2023-11-17 PROCEDURE — 700111 HCHG RX REV CODE 636 W/ 250 OVERRIDE (IP): Performed by: STUDENT IN AN ORGANIZED HEALTH CARE EDUCATION/TRAINING PROGRAM

## 2023-11-17 PROCEDURE — 88341 IMHCHEM/IMCYTCHM EA ADD ANTB: CPT | Mod: 91

## 2023-11-17 PROCEDURE — 71045 X-RAY EXAM CHEST 1 VIEW: CPT

## 2023-11-17 PROCEDURE — 160046 HCHG PACU - 1ST 60 MINS PHASE II: Performed by: STUDENT IN AN ORGANIZED HEALTH CARE EDUCATION/TRAINING PROGRAM

## 2023-11-17 PROCEDURE — 160031 HCHG SURGERY MINUTES - 1ST 30 MINS LEVEL 5: Performed by: STUDENT IN AN ORGANIZED HEALTH CARE EDUCATION/TRAINING PROGRAM

## 2023-11-17 PROCEDURE — 71250 CT THORAX DX C-: CPT

## 2023-11-17 PROCEDURE — 88177 CYTP FNA EVAL EA ADDL: CPT | Mod: 91

## 2023-11-17 PROCEDURE — 502714 HCHG ROBOTIC SURGERY SERVICES: Performed by: STUDENT IN AN ORGANIZED HEALTH CARE EDUCATION/TRAINING PROGRAM

## 2023-11-17 PROCEDURE — 88342 IMHCHEM/IMCYTCHM 1ST ANTB: CPT

## 2023-11-17 PROCEDURE — A9270 NON-COVERED ITEM OR SERVICE: HCPCS | Performed by: STUDENT IN AN ORGANIZED HEALTH CARE EDUCATION/TRAINING PROGRAM

## 2023-11-17 PROCEDURE — 88173 CYTOPATH EVAL FNA REPORT: CPT

## 2023-11-17 PROCEDURE — 160048 HCHG OR STATISTICAL LEVEL 1-5: Performed by: STUDENT IN AN ORGANIZED HEALTH CARE EDUCATION/TRAINING PROGRAM

## 2023-11-17 PROCEDURE — 88172 CYTP DX EVAL FNA 1ST EA SITE: CPT

## 2023-11-17 PROCEDURE — 700101 HCHG RX REV CODE 250: Performed by: STUDENT IN AN ORGANIZED HEALTH CARE EDUCATION/TRAINING PROGRAM

## 2023-11-17 PROCEDURE — 31628 BRONCHOSCOPY/LUNG BX EACH: CPT | Performed by: STUDENT IN AN ORGANIZED HEALTH CARE EDUCATION/TRAINING PROGRAM

## 2023-11-17 PROCEDURE — 88333 PATH CONSLTJ SURG CYTO XM 1: CPT

## 2023-11-17 PROCEDURE — 88305 TISSUE EXAM BY PATHOLOGIST: CPT | Mod: 59

## 2023-11-17 PROCEDURE — C1887 CATHETER, GUIDING: HCPCS | Performed by: STUDENT IN AN ORGANIZED HEALTH CARE EDUCATION/TRAINING PROGRAM

## 2023-11-17 PROCEDURE — 160002 HCHG RECOVERY MINUTES (STAT): Performed by: STUDENT IN AN ORGANIZED HEALTH CARE EDUCATION/TRAINING PROGRAM

## 2023-11-17 PROCEDURE — 31654 BRONCH EBUS IVNTJ PERPH LES: CPT | Performed by: STUDENT IN AN ORGANIZED HEALTH CARE EDUCATION/TRAINING PROGRAM

## 2023-11-17 PROCEDURE — 160036 HCHG PACU - EA ADDL 30 MINS PHASE I: Performed by: STUDENT IN AN ORGANIZED HEALTH CARE EDUCATION/TRAINING PROGRAM

## 2023-11-17 PROCEDURE — 160009 HCHG ANES TIME/MIN: Performed by: STUDENT IN AN ORGANIZED HEALTH CARE EDUCATION/TRAINING PROGRAM

## 2023-11-17 PROCEDURE — 31623 DX BRONCHOSCOPE/BRUSH: CPT | Performed by: STUDENT IN AN ORGANIZED HEALTH CARE EDUCATION/TRAINING PROGRAM

## 2023-11-17 PROCEDURE — 88112 CYTOPATH CELL ENHANCE TECH: CPT

## 2023-11-17 PROCEDURE — 31624 DX BRONCHOSCOPE/LAVAGE: CPT | Performed by: STUDENT IN AN ORGANIZED HEALTH CARE EDUCATION/TRAINING PROGRAM

## 2023-11-17 PROCEDURE — 160042 HCHG SURGERY MINUTES - EA ADDL 1 MIN LEVEL 5: Performed by: STUDENT IN AN ORGANIZED HEALTH CARE EDUCATION/TRAINING PROGRAM

## 2023-11-17 PROCEDURE — 160035 HCHG PACU - 1ST 60 MINS PHASE I: Performed by: STUDENT IN AN ORGANIZED HEALTH CARE EDUCATION/TRAINING PROGRAM

## 2023-11-17 PROCEDURE — 700105 HCHG RX REV CODE 258: Performed by: STUDENT IN AN ORGANIZED HEALTH CARE EDUCATION/TRAINING PROGRAM

## 2023-11-17 PROCEDURE — 700102 HCHG RX REV CODE 250 W/ 637 OVERRIDE(OP): Performed by: STUDENT IN AN ORGANIZED HEALTH CARE EDUCATION/TRAINING PROGRAM

## 2023-11-17 PROCEDURE — 31629 BRONCHOSCOPY/NEEDLE BX EACH: CPT | Performed by: STUDENT IN AN ORGANIZED HEALTH CARE EDUCATION/TRAINING PROGRAM

## 2023-11-17 PROCEDURE — 160025 RECOVERY II MINUTES (STATS): Performed by: STUDENT IN AN ORGANIZED HEALTH CARE EDUCATION/TRAINING PROGRAM

## 2023-11-17 RX ORDER — SODIUM CHLORIDE, SODIUM LACTATE, POTASSIUM CHLORIDE, CALCIUM CHLORIDE 600; 310; 30; 20 MG/100ML; MG/100ML; MG/100ML; MG/100ML
INJECTION, SOLUTION INTRAVENOUS CONTINUOUS
Status: ACTIVE | OUTPATIENT
Start: 2023-11-17 | End: 2023-11-17

## 2023-11-17 RX ORDER — SCOLOPAMINE TRANSDERMAL SYSTEM 1 MG/1
PATCH, EXTENDED RELEASE TRANSDERMAL
Status: COMPLETED
Start: 2023-11-17 | End: 2023-11-17

## 2023-11-17 RX ORDER — ACETAMINOPHEN 500 MG
TABLET ORAL
Status: COMPLETED
Start: 2023-11-17 | End: 2023-11-17

## 2023-11-17 RX ORDER — LIDOCAINE HYDROCHLORIDE 20 MG/ML
INJECTION, SOLUTION EPIDURAL; INFILTRATION; INTRACAUDAL; PERINEURAL PRN
Status: DISCONTINUED | OUTPATIENT
Start: 2023-11-17 | End: 2023-11-17 | Stop reason: SURG

## 2023-11-17 RX ORDER — DEXAMETHASONE SODIUM PHOSPHATE 4 MG/ML
INJECTION, SOLUTION INTRA-ARTICULAR; INTRALESIONAL; INTRAMUSCULAR; INTRAVENOUS; SOFT TISSUE PRN
Status: DISCONTINUED | OUTPATIENT
Start: 2023-11-17 | End: 2023-11-17 | Stop reason: SURG

## 2023-11-17 RX ORDER — ONDANSETRON 2 MG/ML
4 INJECTION INTRAMUSCULAR; INTRAVENOUS
Status: DISCONTINUED | OUTPATIENT
Start: 2023-11-17 | End: 2023-11-17 | Stop reason: HOSPADM

## 2023-11-17 RX ORDER — MIDAZOLAM HYDROCHLORIDE 1 MG/ML
INJECTION INTRAMUSCULAR; INTRAVENOUS PRN
Status: DISCONTINUED | OUTPATIENT
Start: 2023-11-17 | End: 2023-11-17 | Stop reason: SURG

## 2023-11-17 RX ORDER — ONDANSETRON 2 MG/ML
INJECTION INTRAMUSCULAR; INTRAVENOUS PRN
Status: DISCONTINUED | OUTPATIENT
Start: 2023-11-17 | End: 2023-11-17 | Stop reason: SURG

## 2023-11-17 RX ORDER — HALOPERIDOL 5 MG/ML
1 INJECTION INTRAMUSCULAR
Status: DISCONTINUED | OUTPATIENT
Start: 2023-11-17 | End: 2023-11-17 | Stop reason: HOSPADM

## 2023-11-17 RX ORDER — ACETAMINOPHEN 325 MG/1
TABLET ORAL PRN
Status: DISCONTINUED | OUTPATIENT
Start: 2023-11-17 | End: 2023-11-17 | Stop reason: SURG

## 2023-11-17 RX ORDER — SCOLOPAMINE TRANSDERMAL SYSTEM 1 MG/1
PATCH, EXTENDED RELEASE TRANSDERMAL PRN
Status: DISCONTINUED | OUTPATIENT
Start: 2023-11-17 | End: 2023-11-17 | Stop reason: SURG

## 2023-11-17 RX ORDER — ROCURONIUM BROMIDE 10 MG/ML
INJECTION, SOLUTION INTRAVENOUS PRN
Status: DISCONTINUED | OUTPATIENT
Start: 2023-11-17 | End: 2023-11-17 | Stop reason: SURG

## 2023-11-17 RX ADMIN — ACETAMINOPHEN 1000 MG: 325 TABLET ORAL at 12:56

## 2023-11-17 RX ADMIN — SCOPOLAMINE 1 PATCH: 1.5 PATCH, EXTENDED RELEASE TRANSDERMAL at 12:56

## 2023-11-17 RX ADMIN — MIDAZOLAM 2 MG: 1 INJECTION, SOLUTION INTRAMUSCULAR; INTRAVENOUS at 13:14

## 2023-11-17 RX ADMIN — LIDOCAINE HYDROCHLORIDE 100 MG: 20 INJECTION, SOLUTION EPIDURAL; INFILTRATION; INTRACAUDAL at 13:14

## 2023-11-17 RX ADMIN — SUGAMMADEX 200 MG: 100 INJECTION, SOLUTION INTRAVENOUS at 14:22

## 2023-11-17 RX ADMIN — DEXAMETHASONE SODIUM PHOSPHATE 8 MG: 4 INJECTION INTRA-ARTICULAR; INTRALESIONAL; INTRAMUSCULAR; INTRAVENOUS; SOFT TISSUE at 13:22

## 2023-11-17 RX ADMIN — PROPOFOL 50 MCG/KG/MIN: 10 INJECTION, EMULSION INTRAVENOUS at 13:17

## 2023-11-17 RX ADMIN — PROPOFOL 160 MG: 10 INJECTION, EMULSION INTRAVENOUS at 13:14

## 2023-11-17 RX ADMIN — SODIUM CHLORIDE, POTASSIUM CHLORIDE, SODIUM LACTATE AND CALCIUM CHLORIDE: 600; 310; 30; 20 INJECTION, SOLUTION INTRAVENOUS at 11:13

## 2023-11-17 RX ADMIN — LIDOCAINE HYDROCHLORIDE 0.5 ML: 10 INJECTION, SOLUTION EPIDURAL; INFILTRATION; INTRACAUDAL; PERINEURAL at 11:14

## 2023-11-17 RX ADMIN — ONDANSETRON 4 MG: 2 INJECTION INTRAMUSCULAR; INTRAVENOUS at 13:22

## 2023-11-17 RX ADMIN — ROCURONIUM BROMIDE 20 MG: 50 INJECTION, SOLUTION INTRAVENOUS at 14:06

## 2023-11-17 RX ADMIN — FENTANYL CITRATE 100 MCG: 50 INJECTION, SOLUTION INTRAMUSCULAR; INTRAVENOUS at 13:14

## 2023-11-17 RX ADMIN — ROCURONIUM BROMIDE 50 MG: 50 INJECTION, SOLUTION INTRAVENOUS at 13:14

## 2023-11-17 ASSESSMENT — FIBROSIS 4 INDEX: FIB4 SCORE: 1.47

## 2023-11-17 NOTE — H&P
"Critical Care History & Physical Note    Date of Service  11/17/2023    Primary Care Physician  Mariya Mills M.D.      Code Status  Prior    Chief Complaint  No chief complaint on file.      History of Presenting Illness  \"Babatunde Sullivan is a very pleasant 57 y.o. male with a past medical history of metastatic testicular cancer treated with surgery and chemotherapy and left nephrectomy and a thoracotomy with wedge resection in 1984, referred by Dr. Howe from the oncology clinic to establish care for new diagnosis of right middle lobe adenocarcinoma.  Symptoms began in 2020 developed with hemoptysis and a cough.  A CT chest in January 2021 noted lung mass is right middle lobe for which she underwent a bronchoscopy/biopsy by Dr. Rahman which confirmed adenocarcinoma.  He is now status post 3 rounds of chemotherapy and lobectomy by Dr. Ganser. Never smoker.     Surveillance scans since lobectomy were personally reviewed at time of consultation  CT chest 5/2023 demonstrated enlarging right paratracheal lymph node.  Subsequent PET/CT in 6/2023 showed no focal hypermetabolism and morphologically normal.  CT chest 9/2023 demonstrated a new ovoid nodule in the posterior right upper lobe measuring 9 x 9 mm.  Otherwise no significant mediastinal or hilar adenopathy.  A follow-up surveillance CT scan demonstrated interval increase in the right upper lobe nodule now measuring 14 mm in greatest dimension as well as a new right pleural effusion.     Symptomatically denies any cough, chest pain, shortness of breath or unexplained weight loss.\" Dr. King 11/10/23    Here for Ion navigational bronchoscopy +/- FNA, biopsy, brushings, washings, EBUS with FNA.    Review of Systems  As noted above    Past Medical History   has a past medical history of Allergy, Anesthesia, Arthritis, Back pain, Breath shortness, Bronchitis (03/2021), Cancer (HCC) (1984), Cancer (HCC) (09/2022), Carcinoma in situ of respiratory system, Chickenpox, " Cough, Heart burn, History of blood transfusion (1984), Influenza, Obesity, PONV (postoperative nausea and vomiting), Ringing in ears, Tonsillitis, Wears glasses, and Wheezing.    Surgical History   has a past surgical history that includes tonsillectomy (1972); arthroscopy, knee (Right, 2006); other (Left, 1984); wedge resection lung (Right, 1984); henrietta by laparoscopy (2002); mass excision general (Left, 1984); pr bronchoscopy,diagnostic (N/A, 09/01/2022); endobronchial us add-on (09/01/2022); other orthopedic surgery (2006); other abdominal surgery (12/1984); thoracotomy (Right, 02/01/2023); node dissection (Right, 02/01/2023); and hydrocelectomy adult (Right).     Family History  family history includes Cancer in his father; Diabetes in his father, maternal aunt, maternal grandmother, and maternal uncle; Heart Disease in his mother, paternal grandfather, and paternal grandmother; Hypertension in his father; Stroke in his father and maternal grandmother.      Social History   reports that he has never smoked. He has been exposed to tobacco smoke. He has never used smokeless tobacco. He reports that he does not currently use alcohol. He reports that he does not currently use drugs after having used the following drugs: Oral.    Allergies  Allergies   Allergen Reactions    Mefoxin Swelling     .       Medications  Prior to Admission Medications   Prescriptions Last Dose Informant Patient Reported? Taking?   Multiple Vitamin (MULTIVITAMINS PO) 11/15/2023 Patient Yes No   Sig: Take 1 Tablet by mouth every day.   PSYLLIUM PO 11/14/2023 Patient Yes No   Sig: Take 5 Capsules by mouth 1 time a day as needed.   Zinc Citrate (ZINC EXTRA STRENGTH PO) 11/15/2023 Patient Yes No   Sig: Take 50 mg by mouth every day.   acetaminophen (TYLENOL) 500 MG Tab 11/16/2023  Yes No   Sig: Take 500-1,000 mg by mouth every 6 hours as needed.   hydroCHLOROthiazide 50 MG Tab 11/16/2023  No No   Sig: Take 1 Tablet by mouth 2 times a day.     "  Facility-Administered Medications: None       Physical Exam  Temp:  [36.2 °C (97.2 °F)] 36.2 °C (97.2 °F)  Pulse:  [80] 80  Resp:  [18] 18  BP: (142)/(82) 142/82  SpO2:  [92 %] 92 %  Blood Pressure: (!) 142/82   Temperature: 36.2 °C (97.2 °F)   Pulse: 80   Respiration: 18   Pulse Oximetry: 92 %       Physical Exam  Vitals reviewed.   HENT:      Mouth/Throat:      Mouth: Mucous membranes are moist.   Eyes:      Conjunctiva/sclera: Conjunctivae normal.   Cardiovascular:      Rate and Rhythm: Normal rate and regular rhythm.   Pulmonary:      Effort: Pulmonary effort is normal. No respiratory distress.   Abdominal:      Palpations: Abdomen is soft.   Musculoskeletal:         General: No deformity.   Skin:     General: Skin is warm and dry.   Neurological:      General: No focal deficit present.      Mental Status: He is alert.   Psychiatric:         Mood and Affect: Mood normal.         Behavior: Behavior normal.         Laboratory:          No results for input(s): \"ALTSGPT\", \"ASTSGOT\", \"ALKPHOSPHAT\", \"TBILIRUBIN\", \"DBILIRUBIN\", \"GAMMAGT\", \"AMYLASE\", \"LIPASE\", \"ALB\", \"PREALBUMIN\", \"GLUCOSE\" in the last 72 hours.      No results for input(s): \"NTPROBNP\" in the last 72 hours.      No results for input(s): \"TROPONINT\" in the last 72 hours.    Imaging:  DX-PORTABLE FLUOROSCOPY < 1 HOUR    (Results Pending)   DX-CHEST-LIMITED (1 VIEW)    (Results Pending)   CT-CHEST (THORAX) W/O    (Results Pending)       Assessment/Plan:    RUL nodule  Plan for Ion navigational bronchoscopy +/- FNA, biopsy, brushings, washings, EBUS with FNA.  - f/u path results        Genevieve Calderon MD  Pulmonary and Critical Care Medicine  [facility]    "

## 2023-11-17 NOTE — OR NURSING
1130 Patient allergies and NPO status verified, home medication reconciliation completed, belongings secured. Patient verbalizes understanding of pain scale, expected course of stay and plan of care. Surgical site verified with patient, IV access established.   1132 Pt transported to CT   1153 Pt returns from CT awaiting procedure.

## 2023-11-17 NOTE — FLOWSHEET NOTE
11/17/23 1310   Bronchoscopy Procedure   Bronchoscopy Procedure Yes   Order placed in Epic? Yes   Start Time 1310   End Time 1424   Performing Physician Dr. Calderon   Procedure Monitoring Tech Time Bronchoscopy (60 Min)

## 2023-11-17 NOTE — PROCEDURES
.Bronchoscopy Procedure Note     Findings: RUL nodule     Specimen:   FNA slides and core from RUL nodule  Biopsy touchprep and core from RUL nodule  Brushing from RUL nodule  BAL from RUL    Location: [facility] Endoscopy Suite     Date of Operation:TD@     Attending Physician Performing Procedure: Genevieve Calderon MD     Pre-op Diagnosis: RUL nodule     Post-op Diagnosis: RUL nodule     Anesthesia: General     Operation:   Diagnostic flexible fiberoptic bronchoscopy, with bronchoalveolar lavage, navigational bronchoscopy with fine needle aspiration and transbronchial biopsies and fluoroscopy and endobronchial linear and radial ultrasound      Estimated Blood Loss: 30cc     Complications: None     Indications and History:     The patient is a 57 y.o. male. The risks, benefits, complications, treatment options and expected outcomes were discussed with the patient. The possibilities of reaction to medication, pulmonary aspiration, perforation of a viscus, bleeding, failure to diagnose a condition and creating a complication requiring transfusion or operation were discussed with the patient who freely signed the consent.     Description of Procedure:     The patient was seen in the Pre-op area and interval H&P was verified. The patient was taken to the endoscopy suite, identified as Babatunde Sullivan and a Time Out was held and the above information confirmed. Following induction of general anesthesia and intubation, careful inspection of the tracheal lumen was accomplished with the bronchoscope. Luann was noted to be sharp. There were no secretions bilaterally. The right upper lob looks erythematous and friable with narrowed and easily collapsible airways. No endobronchial lesions seen. Left upper lobe proper, lingual, and lower lobe areas on the left side were similarly normal in their segmental and subsegmental anatomy with no endobronchial lesions seen.     Robotic navigation bronchoscopy was then  performed with Ion platform.  It was very difficult to map out the airways on the navigational system - the closest airway was about 1.4-1.5cm away so I attempted to extend a couple of the airways. Partial registration was used.  Ion robotic catheter was used to engage the apical segment of right upper lobe.  Target lesion is about 1.3cm in diameter.   Under navigational guidance the ion robotic catheter was advanced to 1.1 cm away from the planned target. Fluoroscopy was used to confirm catheter positioning.    Radial EBUS was performed to confirm that the location of the nodule is eccentric.    Transbronchial needle aspiration was performed with 19 gauge needle through the extended working channel catheter.  Total 10 samples were collected.  Samples sent for pathology.    Transbronchial biopsy was performed with alligator forceps through the extended working channel catheter.  Total 12 samples were collected.  Samples sent for pathology    Transbronchial brushing was performed with brush the extended working channel catheter.  Total 1 samples were collected.  Samples sent for cytology.    Bronchial alveolar lavage was performed the extended working channel catheter.  Instilled 10 cc of NS, suction returned with 10 cc of bloody fluid.  Samples sent for cultures.    ROBY findings: macrophages and rare atypical cells      The airway was subsequently cleared and hemostasis was ensured. A post-procedural CXR confirmed no pneumothorax. The patient was subsequently taken to the PACU in satisfactory condition.         Genevieve Calderon MD  Pulmonary and Critical Care Medicine  [facility]

## 2023-11-17 NOTE — ANESTHESIA PROCEDURE NOTES
Airway    Date/Time: 11/17/2023 1:17 PM    Performed by: Jarod Terry M.D.  Authorized by: Jarod Terry M.D.    Location:  OR  Urgency:  Elective  Indications for Airway Management:  Anesthesia      Spontaneous Ventilation: absent    Sedation Level:  Deep  Preoxygenated: Yes    Patient Position:  Sniffing  Mask Difficulty Assessment:  1 - vent by mask  Final Airway Type:  Endotracheal airway  Final Endotracheal Airway:  ETT  Cuffed: Yes    Technique Used for Successful ETT Placement:  Direct laryngoscopy  Devices/Methods Used in Placement:  Intubating stylet    Insertion Site:  Oral  Blade Type:  Yordy  Laryngoscope Blade/Videolaryngoscope Blade Size:  3  ETT Size (mm):  8.5  Measured from:  Teeth  ETT to Teeth (cm):  22  Placement Verified by: capnometry    Cormack-Lehane Classification:  Grade IIa - partial view of glottis  Number of Attempts at Approach:  1

## 2023-11-17 NOTE — ANESTHESIA PREPROCEDURE EVALUATION
Case: [id number] Date/Time: 11/17/23 1250    Procedure: FIBER OPTIC BRONCHOSCOPY WITH POSSIBLE WASH, BRUSH, BRONCHOALVEOLAR LAVAGE, BIOPSY AND FINE NEEDLE ASPIRATION, ENDOBRONCHIAL ULTRASOUND & NAVIGATION,  ROBOTICS    Pre-op diagnosis: PULMONARY NODULES MEDIASTINAL ADENOPATHY    Location:  PROCEDURE ROOM / SURGERY [facility]    Surgeons: Genevieve Calderon M.D.            Relevant Problems   Other   (positive) Mediastinal adenopathy       Physical Exam    Airway   Mallampati: II  TM distance: >3 FB  Neck ROM: full       Cardiovascular - normal exam  Rhythm: regular  Rate: normal  (-) murmur     Dental - normal exam           Pulmonary - normal exam  Breath sounds clear to auscultation     Abdominal    Neurological - normal exam                   Anesthesia Plan    ASA 2       Plan - general       Airway plan will be ETT          Induction: intravenous    Postoperative Plan: Postoperative administration of opioids is intended.    Pertinent diagnostic labs and testing reviewed    Informed Consent:    Anesthetic plan and risks discussed with patient.    Use of blood products discussed with: patient whom consented to blood products.

## 2023-11-17 NOTE — OR NURSING
1429 To PACU from OR via gurney, side rails up x 2 for safety, lungs clear bilaterally, scds on patient and machine operational.   1440 Pt arouses to voice and responds appropriately to RN. Denies pain, nausea or SOB. Plan to get CXR as ordered.   1455 Dr Monahan here at bedside to evaluate patient and CXR; VORB okay to discharge patient when meets criteria.   1510 Pulse ox 88-91% on RA at rest. Instructions given for use of IS x 10 every hour while awake at home. Pt demonstrated understanding of IS technique; IS goal 3450, max 2500.   1520 Pt continues to use IS as instructed. Remains awake without stimulation. Pt reports having a pulse ox at home and average readings at home 88-92% on RA. Does not wear oxygen at home. Placed new sensor on finger and will continue to monitor.   1535 Up to wheelchair to assist with effective ventilation. Pt continues to use IS as instructed and max 3000 noted.   1550 Call to Dr Terry regarding desats into 80s and occasionally 70s but immediately rebounds into 90s. Dr Terry states to continue with IS and more time and will re-evaluate in PACU.   1600 Report to JAYNE Ramos

## 2023-11-17 NOTE — ANESTHESIA TIME REPORT
Anesthesia Start and Stop Event Times       Date Time Event    11/17/2023 1300 Ready for Procedure     1308 Anesthesia Start     1432 Anesthesia Stop          Responsible Staff  11/17/23      Name Role Begin End    Min SATISH Terry M.D. Anesth 1308 1432          Overtime Reason:  no overtime (within assigned shift)    Comments:

## 2023-11-18 NOTE — OR NURSING
1600 Received report from April RN. Pt sitting up in chair, working with IS. Pt on room air, denies pain and nausea.     1620 Dr. Terry at bedside, pt O2 dropped to 88% one time and quickly returned above 90%. Saturations since have been greater that 92%. Dr. Terry states pt ok to discharge.     1625 Report called to Loco GODOY in stage 2, pt transported to stage 2 by CNA, all belongings on [facility] at time of transfer.

## 2023-11-18 NOTE — OR NURSING
1625: Patient arrived to phase II from pacu 1 via gurney. Report received from RN. Respirations spontaneous and non-labored, VSS.     1630: Patient able to void.     1645: Family at bedside.     1658: Patient and family presented discharge instructions, all questions answered. PIV removed and tip intact. Patient discharged with family and all belongings post uneventful stay in PACU 2.

## 2023-11-18 NOTE — ANESTHESIA POSTPROCEDURE EVALUATION
Patient: Babatunde Sullivan    Procedure Summary       Date: 11/17/23 Room / Location:  PROCEDURE ROOM / SURGERY [facility]    Anesthesia Start: 1308 Anesthesia Stop: 1432    Procedure: FIBER OPTIC BRONCHOSCOPY WITH  BRUSH, BRONCHOALVEOLAR LAVAGE, BIOPSY AND FINE NEEDLE ASPIRATION,  NAVIGATION,  ROBOTICS (Right: Mouth) Diagnosis: (PULMONARY NODULE 1)    Surgeons: Genevieve Calderon M.D. Responsible Provider: Jarod Terry M.D.    Anesthesia Type: general ASA Status: 2            Final Anesthesia Type: general  Last vitals  BP   Blood Pressure: (!) 140/86    Temp   36.2 °C (97.2 °F)    Pulse   68   Resp   17    SpO2   92 %      Anesthesia Post Evaluation    Patient location during evaluation: PACU  Patient participation: complete - patient participated  Level of consciousness: awake and alert    Airway patency: patent  Anesthetic complications: no  Cardiovascular status: hemodynamically stable  Respiratory status: acceptable  Hydration status: euvolemic    PONV: none          No notable events documented.     Nurse Pain Score: 0 (NPRS)           3

## 2023-11-20 LAB — PATHOLOGY CONSULT NOTE: NORMAL

## 2023-11-21 ENCOUNTER — TELEPHONE (OUTPATIENT)
Dept: SLEEP MEDICINE | Facility: MEDICAL CENTER | Age: 58
End: 2023-11-21
Payer: COMMERCIAL

## 2023-11-21 DIAGNOSIS — R91.8 LUNG MASS: ICD-10-CM

## 2023-11-21 DIAGNOSIS — C34.91 ADENOCARCINOMA OF RIGHT LUNG (HCC): ICD-10-CM

## 2023-11-21 NOTE — TELEPHONE ENCOUNTER
Called patient about his biopsy results - saw Dr. Howe's note that she will speak with Dr. Ganser regarding resection. I will go ahead and order PFTs for his evaluation. Patient has f/u scheduled with Dr. Howe.      Genevieve Calderon MD  Pulmonary and Critical Care Medicine  [facility]

## 2023-11-22 ENCOUNTER — TELEPHONE (OUTPATIENT)
Dept: HEMATOLOGY ONCOLOGY | Facility: MEDICAL CENTER | Age: 58
End: 2023-11-22
Payer: COMMERCIAL

## 2023-11-22 NOTE — TELEPHONE ENCOUNTER
Called patient to update pathology results came in per Dr. Howe, and have set up an appointment to discuss next steps in person on 11/30. No questions or concerns at this time.

## 2023-11-27 ASSESSMENT — LIFESTYLE VARIABLES
SMOKING_STATUS: NO
TOBACCO_USE: NO

## 2023-11-30 ENCOUNTER — HOSPITAL ENCOUNTER (OUTPATIENT)
Dept: RADIATION ONCOLOGY | Facility: MEDICAL CENTER | Age: 58
End: 2023-11-30
Attending: RADIOLOGY
Payer: COMMERCIAL

## 2023-11-30 ENCOUNTER — HOSPITAL ENCOUNTER (OUTPATIENT)
Dept: HEMATOLOGY ONCOLOGY | Facility: MEDICAL CENTER | Age: 58
End: 2023-11-30
Attending: STUDENT IN AN ORGANIZED HEALTH CARE EDUCATION/TRAINING PROGRAM
Payer: COMMERCIAL

## 2023-11-30 VITALS
WEIGHT: 287.26 LBS | HEIGHT: 76 IN | TEMPERATURE: 97 F | SYSTOLIC BLOOD PRESSURE: 122 MMHG | DIASTOLIC BLOOD PRESSURE: 78 MMHG | BODY MASS INDEX: 34.98 KG/M2 | RESPIRATION RATE: 18 BRPM | OXYGEN SATURATION: 94 % | HEART RATE: 77 BPM

## 2023-11-30 DIAGNOSIS — R91.8 LUNG MASS: ICD-10-CM

## 2023-11-30 DIAGNOSIS — C34.91 ADENOCARCINOMA OF RIGHT LUNG (HCC): ICD-10-CM

## 2023-11-30 PROCEDURE — 99205 OFFICE O/P NEW HI 60 MIN: CPT | Performed by: RADIOLOGY

## 2023-11-30 PROCEDURE — 99214 OFFICE O/P EST MOD 30 MIN: CPT | Performed by: STUDENT IN AN ORGANIZED HEALTH CARE EDUCATION/TRAINING PROGRAM

## 2023-11-30 PROCEDURE — 99214 OFFICE O/P EST MOD 30 MIN: CPT | Performed by: RADIOLOGY

## 2023-11-30 ASSESSMENT — ENCOUNTER SYMPTOMS
COUGH: 1
BLURRED VISION: 0
ROS GI COMMENTS: HEMORRHOID
ORTHOPNEA: 0
FOCAL WEAKNESS: 0
SPUTUM PRODUCTION: 0
SORE THROAT: 0
HEADACHES: 0
ABDOMINAL PAIN: 0
NECK PAIN: 1
BRUISES/BLEEDS EASILY: 0
DEPRESSION: 0
WEIGHT LOSS: 0
SENSORY CHANGE: 0
SHORTNESS OF BREATH: 0
WHEEZING: 0
HEARTBURN: 0
TREMORS: 0
PALPITATIONS: 0
VOMITING: 0
CHILLS: 0
TINGLING: 0
NAUSEA: 0
DIZZINESS: 0
MEMORY LOSS: 0
FEVER: 0

## 2023-11-30 ASSESSMENT — PAIN SCALES - GENERAL: PAINLEVEL: NO PAIN

## 2023-11-30 ASSESSMENT — FIBROSIS 4 INDEX: FIB4 SCORE: 1.49

## 2023-11-30 NOTE — CT SIMULATION
PATIENT NAME Babatunde Sullivan   PRIMARY PHYSICIAN GeneMariya [id number]   REFERRING PHYSICIAN Clementine Howe M.* 1965     Adenocarcinoma, lung (HCC)  Staging form: Lung, AJCC 8th Edition  - Clinical stage from 9/15/2022: Stage IIB (cT2a, cN1, cM0) - Signed by Clementine Howe M.D. on 9/15/2022         Treatment Planning CT Simulation      Order Questions     Question Answer    Is this for a new course of treatment? Yes    Is this an Addendum? No    Implanted Device/Pacemaker No    Simulation Status Initial    Planned Start Date 12/18/2023    Treatment Site Lung    Laterality Right    Treatment Technique SBRT    Treatment Pattern/Frequency Every Other Day    Number of fractions: 3    Concurrent Chemotherapy No    CT Technique 4D    Slice Thickness 2mm    Scan Extent Chest    Treatment Device(s) OmniBoard    Patient Attire Gown    Patient Position Supine    Patient Orientation Head First    Arm Position Up    Treatment Machine TB1 - STx    Treatment Image Guidance CBCT    Frequency (CBCT) Daily    Image Guidance Match PTV - Soft Tissue    Fiducial Tracking 4D CBCT    Treatment Planning Image Fusion CT/PET    Special Physics Consult Stereotactic    Other Orders Weekly Physics Check     Special Tx Procedure    Release to patient Immediate

## 2023-11-30 NOTE — PROGRESS NOTES
"Patient was seen today in clinic with Dr. Deleon for consult.  Vitals signs and weight were obtained and pain assessment was completed.  Allergies and medications were reviewed with the patient.       Vitals/Pain:  Vitals:    11/30/23 1035   BP: 122/78   Pulse: 77   Resp: 18   Temp: 36.1 °C (97 °F)   SpO2: 94%   Weight: (!) 130 kg (287 lb 4.2 oz)   Height: 1.93 m (6' 4\")   Pain Score: No pain        Allergies:   Mefoxin    Current Medications:  Current Outpatient Medications   Medication Sig Dispense Refill    acetaminophen (TYLENOL) 500 MG Tab Take 500-1,000 mg by mouth every 6 hours as needed.      hydroCHLOROthiazide 50 MG Tab Take 1 Tablet by mouth 2 times a day. 180 Tablet 1    Zinc Citrate (ZINC EXTRA STRENGTH PO) Take 50 mg by mouth every day.      Multiple Vitamin (MULTIVITAMINS PO) Take 1 Tablet by mouth every day.      PSYLLIUM PO Take 5 Capsules by mouth 1 time a day as needed.       No current facility-administered medications for this encounter.         PCP:  Gene Ann R.N.  "

## 2023-11-30 NOTE — CONSULTS
RADIATION ONCOLOGY CONSULT    Patient name:  Babatunde Sullivan    Primary Physician:  Mariya Mills M.D. MRN: [id number]  [facility]: [phone]   Referring physician:  Clementine Howe M.*  : 1965, 58 y.o.     DATE OF SERVICE: 2023    IDENTIFICATION: A 58 y.o. male with   Adenocarcinoma, lung (HCC)  Staging form: Lung, AJCC 8th Edition  - Clinical stage from 9/15/2022: Stage IIB (cT2a, cN1, cM0) - Signed by Clementine Howe M.D. on 9/15/2022        He is here at the kind request of Clementine Colorado M.*        HISTORY OF PRESENT ILLNESS:  Subjective     Mr. Sullivan is now a 58-year-old gentleman who comes to discuss treatment options for his recurrent lung cancer.  In brief, his recent history dates back to about late  when he was diagnosed initially with lung adenocarcinoma.  He was clinically felt to have T2N1 disease, and was treated with neoadjuvant chemoimmunotherapy followed by lobectomy with Dr. Ganser.  Surgery was completed in 2023.  Final pathology was with residual tumor, ypT3 ypN1.    Thereafter, he had been on surveillance, and a CT of the chest in September noticed a new ovoid nodule in the posterior right upper lobe measuring about 9 mm.  This was monitored and was noted to be enlarged now measuring 14 mm in November concerning for recurrence.  He saw Dr. Howe for follow-up, and was discussed with surgery as well.  However, because of prior right thoracotomy used for a distant diagnosis of metastatic testicular cancer in which she had partial right lung resection, he is no longer surgical candidate for further right lung resections.  He underwent a bronchoscopy and a right upper lobe nodule biopsy that confirmed recurrent adenocarcinoma with enteric features, consistent with a recurrence of his initial lung cancer.  PFTs are pending, but given that he is not a surgical candidate, he now comes to me to discuss radiation options.    He feels relatively well, does not  have any major cardiopulmonary complaints, no distant bony pain, no obvious neurologic symptoms.        PROBLEM LIST:  Patient Active Problem List   Diagnosis    Thrombosis    Pulmonary nodule 1 cm or greater in diameter    Adenocarcinoma, lung (HCC)    Right inguinal hernia    History of testicular cancer    Acute pain of right shoulder    Mediastinal adenopathy        PAST SURGICAL HISTORY:  Past Surgical History:   Procedure Laterality Date    MI BRONCHOSCOPY,DIAGNOSTIC Right 11/17/2023    Procedure: FIBER OPTIC BRONCHOSCOPY WITH  BRUSH, BRONCHOALVEOLAR LAVAGE, BIOPSY AND FINE NEEDLE ASPIRATION,  NAVIGATION,  ROBOTICS;  Surgeon: Genevieve Calderon M.D.;  Location: [facility];  Service: Pulmonary Robotic    THORACOTOMY Right 02/01/2023    Procedure: RIGHT THORACOTOMY, MIDDLE AND LOWER LOBECTOMY, NODE DISSECTION;  Surgeon: John H Ganser, M.D.;  Location: [facility];  Service: General    NODE DISSECTION Right 02/01/2023    Procedure: LYMPHADENECTOMY;  Surgeon: John H Ganser, M.D.;  Location: [facility];  Service: General    MI BRONCHOSCOPY,DIAGNOSTIC N/A 09/01/2022    Procedure: FIBER OPTIC BRONCHOSCOPY WITH  BRUSH, BIOPSY, FINE NEEDLE ASPIRATION AND ENDOBRONCHIAL ULTRASOUND;  Surgeon: Adam Rahman M.D.;  Location: [facility];  Service: Pulmonary    ENDOBRONCHIAL US ADD-ON  09/01/2022    Procedure: ENDOBRONCHIAL ULTRASOUND (EBUS);  Surgeon: Adam Rahman M.D.;  Location: [facility];  Service: Pulmonary    ARTHROSCOPY, KNEE Right 2006    acl    JOCELINE BY LAPAROSCOPY  2002    scar tissue removed 2 years later (2004)    OTHER Left 1984    Embrionic carcinoma, testicle removed    WEDGE RESECTION LUNG Right 1984    toracotomy with wedge resection    MASS EXCISION GENERAL Left 1984    Mass removed left kidney    TONSILLECTOMY  1972    HYDROCELECTOMY ADULT Right     OTHER ABDOMINAL SURGERY  12/1984    Lymphadenectomy and tumor removal    OTHER ORTHOPEDIC  "SURGERY  2006    Right ACL replacement       CURRENT MEDICATIONS:  Current Outpatient Medications   Medication Sig Dispense Refill    acetaminophen (TYLENOL) 500 MG Tab Take 500-1,000 mg by mouth every 6 hours as needed.      hydroCHLOROthiazide 50 MG Tab Take 1 Tablet by mouth 2 times a day. 180 Tablet 1    Zinc Citrate (ZINC EXTRA STRENGTH PO) Take 50 mg by mouth every day.      Multiple Vitamin (MULTIVITAMINS PO) Take 1 Tablet by mouth every day.      PSYLLIUM PO Take 5 Capsules by mouth 1 time a day as needed.       No current facility-administered medications for this encounter.       ALLERGIES:    Mefoxin    FAMILY HISTORY:    family history includes Cancer in his father and paternal uncle; Diabetes in his father, maternal aunt, maternal grandmother, and maternal uncle; Heart Disease in his mother, paternal grandfather, and paternal grandmother; Hypertension in his father; Stroke in his father and maternal grandmother.    SOCIAL HISTORY:     reports that he has never smoked. He has been exposed to tobacco smoke. He has never used smokeless tobacco. He reports that he does not currently use alcohol. He reports that he does not currently use drugs after having used the following drugs: Oral.  Patient currently resides in [place] with his spouse Gilberto. He is employed as a [employer] at the [employer].      REVIEW OF SYSTEMS:    A complete review of systems taken. Pertinent items in HPI. All others negative.    PHYSICAL EXAM:    PERFORMANCE STATUS:       No data to display                   No data to display              /78   Pulse 77   Temp 36.1 °C (97 °F)   Resp 18   Ht 1.93 m (6' 4\")   Wt (!) 130 kg (287 lb 4.2 oz)   SpO2 94%   BMI 34.97 kg/m²   Physical Exam  Constitutional:       Appearance: Normal appearance.   HENT:      Head: Normocephalic and atraumatic.   Eyes:      Extraocular Movements: Extraocular movements intact.      Conjunctiva/sclera: Conjunctivae normal.   Cardiovascular:      Rate and " Rhythm: Normal rate and regular rhythm.   Pulmonary:      Effort: Pulmonary effort is normal.      Breath sounds: Normal breath sounds.   Abdominal:      General: Abdomen is flat.      Palpations: Abdomen is soft.   Musculoskeletal:         General: Normal range of motion.   Neurological:      General: No focal deficit present.      Mental Status: He is alert and oriented to person, place, and time.          LABORATORY DATA:   Lab Results   Component Value Date/Time    WBC 5.6 06/03/2023 07:28 AM    RBC 4.45 (L) 06/03/2023 07:28 AM    HEMOGLOBIN 14.8 06/03/2023 07:28 AM    HEMATOCRIT 43.7 06/03/2023 07:28 AM    MCV 98.2 (H) 06/03/2023 07:28 AM    MCH 33.3 (H) 06/03/2023 07:28 AM    MCHC 33.9 06/03/2023 07:28 AM    RDW 50.5 (H) 06/03/2023 07:28 AM    PLATELETCT 176 06/03/2023 07:28 AM    MPV 10.6 06/03/2023 07:28 AM    NEUTSPOLYS 61.60 06/03/2023 07:28 AM    LYMPHOCYTES 22.90 06/03/2023 07:28 AM    MONOCYTES 10.60 06/03/2023 07:28 AM    EOSINOPHILS 3.50 06/03/2023 07:28 AM    BASOPHILS 0.90 06/03/2023 07:28 AM      Lab Results   Component Value Date/Time    SODIUM 144 06/03/2023 07:28 AM    POTASSIUM 4.2 06/03/2023 07:28 AM    CHLORIDE 104 06/03/2023 07:28 AM    CO2 30 06/03/2023 07:28 AM    GLUCOSE 130 (H) 06/03/2023 07:28 AM    BUN 18 06/03/2023 07:28 AM    CREATININE 0.81 06/03/2023 07:28 AM           RADIOLOGY DATA:  CT-CHEST (THORAX) W/O    Result Date: 11/17/2023  1.  1.4 cm mass right upper lobe again demonstrated. 2.  Postoperative changes right lower lobe and elevation right hemidiaphragm. 3.  Trace right pleural effusion. Fleischner Society pulmonary nodule recommendations: Not Applicable     CT-CHEST (THORAX) WITH    Result Date: 11/2/2023  1. Increased, now 14 mm highly suspicious right upper lobe pulmonary nodule. 2. Tiny simple right pleural effusion. 3. 2.5 cm fat-containing midline upper abdominal wall ventral hernia. Fleischner Society pulmonary nodule recommendations: Not Applicable      DX-CHEST-LIMITED (1 VIEW)    Result Date: 11/20/2023  Digitized intraoperative radiograph is submitted for review. This examination is not for diagnostic purpose but for guidance during a surgical procedure. Please see the patient's chart for full procedural details. INTERPRETING LOCATION: [address], [place], [address]    DX-CHEST-LIMITED (1 VIEW)    Result Date: 11/17/2023  Chronic elevation right hemidiaphragm with volume loss within the right lung base. No evidence of pneumothorax.    DX-PORTABLE FLUOROSCOPY < 1 HOUR    Result Date: 11/20/2023  Portable fluoroscopy utilized for 1.2 minutes. INTERPRETING LOCATION: [address], [place], [address]      IMPRESSION:    A 58 y.o. with  Adenocarcinoma, lung (HCC)  Staging form: Lung, AJCC 8th Edition  - Clinical stage from 9/15/2022: Stage IIB (cT2a, cN1, cM0) - Signed by Clementine Howe M.D. on 9/15/2022        RECOMMENDATIONS:   I reviewed with Mr. Sullivan and his wife his recent work-up with his initial diagnosis of lung cancer, the of the neoadjuvant treatment that he had, his surgical resection, and now is biopsy-proven recurrence.  At this point, he appears to have an isolated intrathoracic recurrence, and I would still favor aggressive treatment to potentially cure him of this.  Given that he is not a surgical candidate, this would be very reasonable to treat with stereotactic radiation with a high likelihood of local regional control of his recurrent lesion in the right upper lobe.  However, prior to proceeding, given that he is only had CT scans and his last PET scan was in June of this year, I favor proceeding with comprehensive restaging before finalizing the plan.  Therefore, we discussed a possible PET scan and brain MRI as well to which she is agreeable.  Assuming the scans are reassuring and only show the biopsy confirmed location, then I recommend proceeding with SBRT, and we discussed the logistics and set up of daily SBRT over the course of 3-5  treatments, the high likely local regional control, and the expected acute and long-term toxicities.    He is in agreement with that plan, and those scans and the CT simulation have not been scheduled.  If unfortunately the scans show more advanced disease, then we will need to meet back to discuss treatment options appropriate to his extent of disease.    Thank you for the opportunity to participate in his care.  If any questions or comments, please do not hesitate in calling.    Orders Placed This Encounter    Rad Onc Treatment Planning CT Simulation    [id number] BASE TO MID-THIGH    MR-BRAIN-WITH & W/O    REFERRAL TO ONCOLOGY NURSE NAVIGATOR

## 2023-11-30 NOTE — PROGRESS NOTES
Consult Note: Hematology/Oncology     Primary Care:  Yovany Packer M.D.    CC: Post care for newly diagnosed adenocarcinoma    Treatment history:  10/11/22: C1D1 Cisplatin, Pemetrexed and Nivolumab  11/01/22: C2D1 Carbo, Pemetrexed and Nivolumab (Cis changed to Carbo d/t tinnitus)  11/22/22: C3D1 Carbo, Pemetrexed and Nivolumab    2/1/2023: Lobectomy per Dr. Ganser    Prior Treatment: Surgery chemotherapy for metastatic nonseminomatous testicular cancer    Subjective:   History of Presenting Illness:  Babatunde Sullivan is a 57 y.o. male with a past medical history of metastatic testicular cancer treated with surgery and chemotherapy in 1984 presents today to establish care for a new diagnosis of right middle lobe adenocarcinoma.    Patient reports that he has had a year and a half of coughing and shortness of breath.  In March 2020 he had hemoptysis resumed from coughing vigorously.  Since that time he continued to cough.  In January 2021 he had COVID and had an exacerbation of his shortness of breath and cough.  He had a CT chest which noted a lung mass in his right middle lobe he had a bronchoscopy with biopsy which confirmed adenocarcinoma.    Regarding patient's testicular cancer, he was diagnosed at age 18 in 1984.  He had metastases to his left kidney and lung.  He had his testicle removed as well as his left kidney and a thoracotomy with a wedge resection.  He was noted to be DYLAN at that time.    Note patient father had melanoma but passed from sepsis.  There is no other family history of cancer.  The patient is a snf .  He denies smoking, he does not drink alcohol, and he denies IV drug use.  He lives with his wife and dog.  He has an adopted 23-year-old son who no longer lives at home.    Patient lives in [place] is [place] minutes east of [place].    He is now s/p 3 rounds of chemotherapy.     He is s/p lobectomy and was found to have residual tumor [id number]    CT scan that was done on  September 18 2023.  There is a new nodule in the posterior right upper lobe measuring about 1 cm which appears to be directly associated with a small vascular structure.      11/17 CT scan shows mass increasing in his RUL.  Biopsy demonstrated adenocarcinoma.      Today, patient is here to discuss next steps.     He reports no worsening SOB, no hemoptysis. No weight loss.  No increase in cough (but has the baseline cough that has not improved).      Past Medical History:   Diagnosis Date    Allergy     Anesthesia     PONV    Arthritis     osteo-lower back    Back pain     Breath shortness     Bronchitis 03/2021    Bronchitis approx once a year    Cancer (HCC) 1984    testicular    Cancer (HCC) 09/2022    Lung CA    Carcinoma in situ of respiratory system     Chickenpox     Cough     Heart burn     History of blood transfusion 1984    Influenza     H/O    Obesity     PONV (postoperative nausea and vomiting)     Ringing in ears     Tonsillitis     H/O    Wears glasses     Wheezing         Past Surgical History:   Procedure Laterality Date    LA BRONCHOSCOPY,DIAGNOSTIC Right 11/17/2023    Procedure: FIBER OPTIC BRONCHOSCOPY WITH  BRUSH, BRONCHOALVEOLAR LAVAGE, BIOPSY AND FINE NEEDLE ASPIRATION,  NAVIGATION,  ROBOTICS;  Surgeon: Genevieve Calderon M.D.;  Location: [facility];  Service: Pulmonary Robotic    THORACOTOMY Right 02/01/2023    Procedure: RIGHT THORACOTOMY, MIDDLE AND LOWER LOBECTOMY, NODE DISSECTION;  Surgeon: John H Ganser, M.D.;  Location: [facility];  Service: General    NODE DISSECTION Right 02/01/2023    Procedure: LYMPHADENECTOMY;  Surgeon: John H Ganser, M.D.;  Location: [facility];  Service: General    LA BRONCHOSCOPY,DIAGNOSTIC N/A 09/01/2022    Procedure: FIBER OPTIC BRONCHOSCOPY WITH  BRUSH, BIOPSY, FINE NEEDLE ASPIRATION AND ENDOBRONCHIAL ULTRASOUND;  Surgeon: Adam Rahman M.D.;  Location: [facility];  Service: Pulmonary    ENDOBRONCHIAL US ADD-ON   2022    Procedure: ENDOBRONCHIAL ULTRASOUND (EBUS);  Surgeon: Adam Rahman M.D.;  [facility];  Service: Pulmonary    ARTHROSCOPY, KNEE Right     acl    JOCELINE BY LAPAROSCOPY      scar tissue removed 2 years later ()    OTHER Left     Embrionic carcinoma, testicle removed    WEDGE RESECTION LUNG Right     toracotomy with wedge resection    MASS EXCISION GENERAL Left     Mass removed left kidney    TONSILLECTOMY  1972    HYDROCELECTOMY ADULT Right     OTHER ABDOMINAL SURGERY  1984    Lymphadenectomy and tumor removal    OTHER ORTHOPEDIC SURGERY  2006    Right ACL replacement       Social History     Tobacco Use    Smoking status: Never     Passive exposure: Past    Smokeless tobacco: Never    Tobacco comments:     Both of my parents smoked cigarettes   Vaping Use    Vaping Use: Never used   Substance Use Topics    Alcohol use: Not Currently     Comment: Have been on the [employer] for 30+ years    Drug use: Not Currently     Types: Oral     Comment: No drugs done in over 30 years        Family History   Problem Relation Age of Onset    Heart Disease Mother         MI age 78    Cancer Father             Hypertension Father     Stroke Father     Diabetes Father             Diabetes Maternal Aunt     Diabetes Maternal Uncle     Cancer Paternal Uncle         skin    Diabetes Maternal Grandmother     Stroke Maternal Grandmother             Heart Disease Paternal Grandmother         MI after giving birth    Heart Disease Paternal Grandfather         MI    Ovarian Cancer Neg Hx     Tubal Cancer Neg Hx     Peritoneal Cancer Neg Hx     Colorectal Cancer Neg Hx     Breast Cancer Neg Hx        Allergies   Allergen Reactions    Mefoxin Swelling     .       Current Outpatient Medications   Medication Sig Dispense Refill    acetaminophen (TYLENOL) 500 MG Tab Take 500-1,000 mg by mouth every 6 hours as needed.      hydroCHLOROthiazide 50 MG Tab Take 1  Tablet by mouth 2 times a day. 180 Tablet 1    Zinc Citrate (ZINC EXTRA STRENGTH PO) Take 50 mg by mouth every day.      Multiple Vitamin (MULTIVITAMINS PO) Take 1 Tablet by mouth every day.      PSYLLIUM PO Take 5 Capsules by mouth 1 time a day as needed.       No current facility-administered medications for this encounter.       Review of Systems   Constitutional:  Negative for chills, fever, malaise/fatigue and weight loss.   HENT:  Negative for congestion, ear pain, nosebleeds, sore throat and tinnitus.    Eyes:  Negative for blurred vision.   Respiratory:  Positive for cough. Negative for sputum production, shortness of breath and wheezing.    Cardiovascular:  Negative for chest pain, palpitations, orthopnea and leg swelling.   Gastrointestinal:  Negative for abdominal pain, heartburn, nausea and vomiting.        Hemorrhoid   Genitourinary:  Negative for dysuria, frequency and urgency.   Musculoskeletal:  Positive for joint pain (rib pain) and neck pain (pinched nerve).   Neurological:  Negative for dizziness, tingling, tremors, sensory change, focal weakness and headaches.   Endo/Heme/Allergies:  Does not bruise/bleed easily.   Psychiatric/Behavioral:  Negative for depression, memory loss and suicidal ideas.    All other systems reviewed and are negative.      Problem list, medications, and allergies reviewed by myself today in Epic.     Objective:     There were no vitals filed for this visit.          DESC; KARNOFSKY SCALE WITH ECOG EQUIVALENT: 100, Fully active, able to carry on all pre-disease performed without restriction (ECOG equivalent 0)    DISTRESS LEVEL: no apparent distress    Physical Exam  Constitutional:       General: He is not in acute distress.     Appearance: Normal appearance.   HENT:      Head: Normocephalic and atraumatic.      Nose: Nose normal. No congestion.      Mouth/Throat:      Mouth: Mucous membranes are moist.      Pharynx: Oropharynx is clear.   Eyes:      General: No scleral  icterus.     Conjunctiva/sclera: Conjunctivae normal.      Pupils: Pupils are equal, round, and reactive to light.   Cardiovascular:      Rate and Rhythm: Normal rate and regular rhythm.      Pulses: Normal pulses.      Heart sounds: No murmur heard.     No friction rub.   Pulmonary:      Effort: Pulmonary effort is normal. No respiratory distress.      Breath sounds: Normal breath sounds. No stridor. No wheezing or rales.   Chest:      Chest wall: No tenderness.   Abdominal:      General: Abdomen is flat. Bowel sounds are normal. There is no distension.      Palpations: Abdomen is soft. There is no mass.      Tenderness: There is no abdominal tenderness. There is no guarding or rebound.   Musculoskeletal:         General: No swelling, tenderness or deformity. Normal range of motion.      Cervical back: Normal range of motion and neck supple. No rigidity or tenderness.      Right lower leg: No edema.      Left lower leg: No edema.   Skin:     General: Skin is warm.      Coloration: Skin is not jaundiced or pale.      Findings: No bruising or rash.   Neurological:      General: No focal deficit present.      Mental Status: He is alert and oriented to person, place, and time. Mental status is at baseline.      Motor: No weakness.   Psychiatric:         Mood and Affect: Mood normal.         Behavior: Behavior normal.         Thought Content: Thought content normal.         Judgment: Judgment normal.       Labs:   Most recent labs reviewed.  Please see the lab tab of chart review    Imaging:   Most recent images below have been independently reviewed by me.      Chest  CT Chest 9/18/23  1.  There is a new ovoid nodule in the posterior right upper lobe measuring 9 x 9 mm which appears to be directly associated with small vascular structure and potentially a small airway. Potentially this could be mucus plugging versus solid lung nodule.   PET/CT may be of further benefit.  2.  No significant mediastinal or hilar  lymphadenopathy.  3.  There are otherwise stable postoperative changes of right middle lobe and lower lobectomy with parenchymal areas of residual scarring.  4.  Stable benign 4 mm left lower lobe nodule abutting the fissure.  5.  A small amount of right pleural effusion remains, decreased from prior study.      CT Ches 5/8/23  IMPRESSION:     Interval right mid and lower lobectomy with no evidence of local recurrence     Enlarging right paratracheal lymph node could be reactive although malignant origin cannot be excluded and attention in follow-up is advised     Small-moderate right pleural effusion is new and nonspecific.      Mediastinum/Amy: There is a right hilar mass/lymphadenopathy with some mass effect on the right anterolateral lower lobe bronchus and encasement of the descending pulmonary artery. Due to postobstructive airspace disease in the right middle   lobe/anterior segment right lower lobe is difficult to obtain an accurate measurement of the suspected masslike area. The AP diameter is estimated at 3.4 cm.    PET scan:  1.  Right hilar mass is again identified which is difficult to distinguish from the consolidated and atelectatic lung distal to the mass extending out to the pleural surface. There are some focal areas of elevated activity within the mass as described   above. Maximum activity level is 8.6 SUV. Differential diagnosis does include lung carcinoma. Metastatic carcinoma is also possible. Inflammatory or infectious lesion is also in the differential diagnosis. Noted calcifications are also present.     2.  The consolidated atelectatic lung surrounding the major fissure in the right middle lobe and right lower lobe, which possibly includes a small amount of fluid in the major fissure located distal to the mass appears decreased slightly in thickness and   volume compared to the prior chest CT.     3.  No mediastinal or left hilar adenopathy. No elevated activity within these nodes.     4.   Postsurgical changes noted in the abdomen and pelvis.     5.  No PET/CT evidence of metastatic disease in the neck abdomen or pelvis.       Pathology:  FINAL DIAGNOSIS:     A. Right middle lobe, forceps biopsy:          Positive for moderately differentiated non-small cell           adenocarcinoma.          Please see comment.   B. Right middle lobe, brushing brush head with touch prep slides:          Atypical cells present suspicious for malignancy.   C. Right lower lobe posterior segment brushing brush head with touch   prep slides:          Bronchial epithelial cells some with reactive changes, scattered           macrophages, lymphocytes, degenerated cells, mucus and blood.          Few rare atypical cells present which cannot be further           characterized.   D. 11R, fine needle aspiration slides:          Positive for malignant cells.   E. 11R, fine needle aspiration core:          Positive for moderately differentiated non-small cell           adenocarcinoma morphologically resembles the tumor in specimen           A.          No lymph node tissue identified in sections examined    Brain MRI : negative for malignancy     Assessment/Plan:      Cancer Staging   Adenocarcinoma, lung (HCC)  Staging form: Lung, AJCC 8th Edition  - Clinical stage from 9/15/2022: Stage IIB (cT2a, cN1, cM0) - Signed by Clementine Howe M.D. on 9/15/2022       Mr. Sullivan is a 57-year-old male with a new diagnosis of adenocarcinoma status post neoadjuvant treatment (3 cycles of nivolumab carboplatin and pemetrexed) and a lobectomy (ypT3 ypN1) He is here for surveillance.    On CT chest, There is a suspicious right upper lobe which was biopsied and shows adenocarcinoma.     Today had a discussion with the patient regarding recurrent carcinoma of the lung.  We discussed case with thoracic surgeon who reported that this will require pneumonectomy and radiation be a better option thus he is being seen in today    Plan:  -Proceed  with radiation, per rad onc  -Will follow-up patient [facility]    Any questions and concerns raised by the patient were addressed and answered. Patient denies any further questions.  Patient encouraged to call the office with any concerns or issues.       Clementine Howe M.D.  Hematology/Oncology    23 minutes was spent on this visit

## 2023-12-01 ENCOUNTER — HOSPITAL ENCOUNTER (OUTPATIENT)
Dept: RADIATION ONCOLOGY | Facility: MEDICAL CENTER | Age: 58
End: 2023-12-31
Attending: RADIOLOGY
Payer: COMMERCIAL

## 2023-12-01 ENCOUNTER — PATIENT OUTREACH (OUTPATIENT)
Dept: ONCOLOGY | Facility: MEDICAL CENTER | Age: 58
End: 2023-12-01
Payer: COMMERCIAL

## 2023-12-01 NOTE — PROGRESS NOTES
Introduction letter, cancer support service calendar and message sent via [employer].  Will follow up with phone call.

## 2023-12-01 NOTE — LETTER
[facility]    [address]  [place] [address]  Phone: [phone] - Fax: [phone]              Babatunde Nate  [address] [place] [address]     Date: 12/01/23    Dear Babatunde,    I am a Cancer Nurse Navigator, a certified oncology nurse. My role is to assess any needs you may have with education, guidance and support. I am available to you and your family through your treatment at [facility].       I am available to address your needs during your journey with the following services:     Care Coordination  I can assist you in facilitating communication between your cancer care treatment team to ensure timely treatment and follow-up.  I can also assist with transition of care back to your primary care provider, or other specialist, as needed.  My goal is to bridge gaps for you throughout the course of your active treatment.       Education Services  Understanding the recommended treatment course by your physician is key. I can provide educational resources personalized to your cancer diagnosis to help you understand your diagnosis and treatment. Please let me know if you would like to receive information about your diagnosis and treatment plan.  I am here to help.     Support Services/Resource Information  [facility] we offer a full scope of support services.  I can assist you with referral information to:  Cancer Clinical Trials & Research  Nutrition counseling  Support groups  Complementary Therapies such as Mind-Body Techniques Meditation  Patient Financial Advocates    [facility] [place], an [employer] affiliate office, our volunteers can assist you with accessing our lending library, support services information, head coverings and comfort items  Community and national resources, included eligibility based greta assistance and pharmaceutical access programs, if you are in need of additional information.     [facility] offers  services that include:  Behavioral Health  Genetic counseling & testing  Acupuncture  Lymphedema prevention/treatment program  Palliative care services.        I hope you have an excellent patient experience.  Please feel free to share with me your comments regarding the care you have received- we value your feedback.    Sincerely,       Patricia Adler R.N.  Cancer Nurse Navigator    Office: [phone]  Main:  [phone]   Email:  [email or web address]

## 2023-12-01 NOTE — PROGRESS NOTES
Call placed to patient.  He reports understands current plan of care.  He does work 7 - 3:30 during the day.  Plan of care pending scans and results.  Pt is coming from [place] and stated assistance with cost of gas would be beneficial.  Will provide patient with transportation greta application when he comes in Tuesday for radiation mapping appointments.  Pt denies other questions or barriers to care at this time.  Continue to follow for navigation assistance.   Statement Selected

## 2023-12-05 ENCOUNTER — HOSPITAL ENCOUNTER (OUTPATIENT)
Dept: RADIOLOGY | Facility: MEDICAL CENTER | Age: 58
End: 2023-12-05
Attending: RADIOLOGY
Payer: COMMERCIAL

## 2023-12-05 ENCOUNTER — HOSPITAL ENCOUNTER (OUTPATIENT)
Dept: RADIATION ONCOLOGY | Facility: MEDICAL CENTER | Age: 58
End: 2023-12-05

## 2023-12-05 DIAGNOSIS — C34.91 ADENOCARCINOMA OF RIGHT LUNG (HCC): ICD-10-CM

## 2023-12-05 PROCEDURE — 77290 THER RAD SIMULAJ FIELD CPLX: CPT | Mod: 26 | Performed by: RADIOLOGY

## 2023-12-05 PROCEDURE — 77470 SPECIAL RADIATION TREATMENT: CPT | Mod: 26 | Performed by: RADIOLOGY

## 2023-12-05 PROCEDURE — 77334 RADIATION TREATMENT AID(S): CPT | Mod: 26 | Performed by: RADIOLOGY

## 2023-12-05 PROCEDURE — 77263 THER RADIOLOGY TX PLNG CPLX: CPT | Performed by: RADIOLOGY

## 2023-12-05 PROCEDURE — A9552 F18 FDG: HCPCS

## 2023-12-05 PROCEDURE — 77290 THER RAD SIMULAJ FIELD CPLX: CPT | Performed by: RADIOLOGY

## 2023-12-05 PROCEDURE — 77470 SPECIAL RADIATION TREATMENT: CPT | Performed by: RADIOLOGY

## 2023-12-05 PROCEDURE — 77334 RADIATION TREATMENT AID(S): CPT | Performed by: RADIOLOGY

## 2023-12-05 NOTE — RADIATION COMPLETION NOTES
DATE OF SERVICE: 12/5/2023    DIAGNOSIS:  Adenocarcinoma, lung (HCC)  Staging form: Lung, AJCC 8th Edition  - Clinical stage from 9/15/2022: Stage IIB (cT2a, cN1, cM0) - Signed by Clementine Howe M.D. on 9/15/2022       DATE OF SERVICE: 12/5/2023    TYPE OF SIMULATION: SBRT    GOAL OF TREATMENT:   [] Curative  [x] Palliative  [] Oligometastatic    CONTRAST:    [] IV Contrast*  [] Oral Contrast               POSITION:    [x]  Supine  [] Prone     IMMOBILIZATION DEVICE: []  Body-Fix  [x] Omniboard  []  Bellyboard    PROCEDURE: Patient placed in immobilization device. 4D gated and non-gated CT obtained to assess organ motion.  Images viewed and approved.  Images reconstructed as need.  Image data sets transferred to Eclipse for planning.    I have personally reviewed the relevant data, performed the target localization, and determined all relevant factors for this patient’s simulation.    *Omnipaque 80 -100cc IVP in conjunction with 500cc NS

## 2023-12-06 ENCOUNTER — PATIENT OUTREACH (OUTPATIENT)
Dept: ONCOLOGY | Facility: MEDICAL CENTER | Age: 58
End: 2023-12-06
Payer: COMMERCIAL

## 2023-12-06 NOTE — PROGRESS NOTES
Pt sent [employer] message reporting he had forgotten to  transportation greta application.  Can mail or email application, whichever patient prefers.  Pt also asking about radiation schedule so he can submit FMLA.    Spoke with radiation therapy.  They will work on getting his treatments scheduled today.  Sent return [employer] message with updated information.

## 2023-12-15 ENCOUNTER — PATIENT OUTREACH (OUTPATIENT)
Dept: ONCOLOGY | Facility: MEDICAL CENTER | Age: 58
End: 2023-12-15
Payer: COMMERCIAL

## 2023-12-15 PROCEDURE — 77293 RESPIRATOR MOTION MGMT SIMUL: CPT | Performed by: RADIOLOGY

## 2023-12-15 PROCEDURE — 77300 RADIATION THERAPY DOSE PLAN: CPT | Mod: 26 | Performed by: RADIOLOGY

## 2023-12-15 PROCEDURE — 77293 RESPIRATOR MOTION MGMT SIMUL: CPT | Mod: 26 | Performed by: RADIOLOGY

## 2023-12-15 PROCEDURE — 77334 RADIATION TREATMENT AID(S): CPT | Mod: 26 | Performed by: RADIOLOGY

## 2023-12-15 PROCEDURE — 77295 3-D RADIOTHERAPY PLAN: CPT | Performed by: RADIOLOGY

## 2023-12-15 PROCEDURE — 77334 RADIATION TREATMENT AID(S): CPT | Performed by: RADIOLOGY

## 2023-12-15 PROCEDURE — 77370 RADIATION PHYSICS CONSULT: CPT | Performed by: RADIOLOGY

## 2023-12-15 PROCEDURE — 77295 3-D RADIOTHERAPY PLAN: CPT | Mod: 26 | Performed by: RADIOLOGY

## 2023-12-15 PROCEDURE — 77300 RADIATION THERAPY DOSE PLAN: CPT | Performed by: RADIOLOGY

## 2023-12-15 NOTE — PROGRESS NOTES
On December 15, 2023, Oncology Social Worker Gaby Orr processed pt.'s [facility] [place] [facility] [employer] Transportation Bhavesh application.  Pt. will receive $200 in gas card to assist with transportation.  OSW Kirby will leave gas card at Radiation Oncology .

## 2023-12-15 NOTE — PROGRESS NOTES
Received completed [facility] [place] [employer] Transportation Bhavesh application via email this am.  Printed and provided to LUCIE Ferguson for processing.

## 2023-12-18 ENCOUNTER — HOSPITAL ENCOUNTER (OUTPATIENT)
Dept: RADIATION ONCOLOGY | Facility: MEDICAL CENTER | Age: 58
End: 2023-12-18

## 2023-12-18 LAB
CHEMOTHERAPY INFUSION START DATE: NORMAL
CHEMOTHERAPY RECORDS: 18
CHEMOTHERAPY RECORDS: 5400
CHEMOTHERAPY RECORDS: NORMAL
DATE 1ST CHEMO CANCER: NORMAL
RAD ONC ARIA COURSE LAST TREATMENT DATE: NORMAL
RAD ONC ARIA COURSE TREATMENT ELAPSED DAYS: NORMAL
RAD ONC ARIA REFERENCE POINT DOSAGE GIVEN TO DATE: 18
RAD ONC ARIA REFERENCE POINT ID: NORMAL
RAD ONC ARIA REFERENCE POINT SESSION DOSAGE GIVEN: 18

## 2023-12-18 PROCEDURE — 77280 THER RAD SIMULAJ FIELD SMPL: CPT | Mod: 26 | Performed by: RADIOLOGY

## 2023-12-18 PROCEDURE — 77280 THER RAD SIMULAJ FIELD SMPL: CPT | Performed by: RADIOLOGY

## 2023-12-18 PROCEDURE — 77373 STRTCTC BDY RAD THER TX DLVR: CPT | Performed by: RADIOLOGY

## 2023-12-18 PROCEDURE — 77435 SBRT MANAGEMENT: CPT | Performed by: RADIOLOGY

## 2023-12-18 NOTE — PROCEDURES
DATE OF SERVICE: 12/18/2023    DIAGNOSIS:  Adenocarcinoma, lung (HCC)  Staging form: Lung, AJCC 8th Edition  - Clinical stage from 9/15/2022: Stage IIB (cT2a, cN1, cM0) - Signed by Clementine Howe M.D. on 9/15/2022       TREATMENT:  Radiation Therapy Episodes       Active Episodes       Radiation Therapy: SBRT (12/18/2023)                   Radiation Treatments         Plan Last Treated On Elapsed Days Fractions Treated Prescribed Fraction Dose (cGy) Prescribed Total Dose (cGy)    R Lung SBRT 12/18/2023 0 @ [id number] 1 of 3 1,800 5,400                  Reference Point Last Treated On Elapsed Days Most Recent Session Dose (cGy) Total Dose (cGy)    PTV 12/18/2023 0 @ [id number] 1,800 1,800                            STEREOTACTIC PROCEDURE NOTE:    Called by Truebeam machine to verify treatment parameters including:  treatment site, treatment dose, and treatment setup prior to stereotactic treatment..    Patient was placed in the treatment position with use of immobilization device and  laser guidance. CBCT images were acquired for target localization.  Images were reviewed in the axial, coronal, and saggital views and shifts were made as necessary to ensure that patient position matched simulation position.      Treatment delivered per  prescription.  The medical physicist was present throughout the set-up, verification and treatment delivery to oversee the procedure and ensure all parameters agreed with the computerized plan.    I have personally reviewed the relevant data, performed the target localization, and determined all relevant factors for this patient’s simulation.

## 2023-12-20 ENCOUNTER — HOSPITAL ENCOUNTER (OUTPATIENT)
Dept: RADIATION ONCOLOGY | Facility: MEDICAL CENTER | Age: 58
End: 2023-12-20

## 2023-12-20 LAB
CHEMOTHERAPY INFUSION START DATE: NORMAL
CHEMOTHERAPY RECORDS: 18
CHEMOTHERAPY RECORDS: 5400
CHEMOTHERAPY RECORDS: NORMAL
DATE 1ST CHEMO CANCER: NORMAL
RAD ONC ARIA COURSE LAST TREATMENT DATE: NORMAL
RAD ONC ARIA COURSE TREATMENT ELAPSED DAYS: NORMAL
RAD ONC ARIA REFERENCE POINT DOSAGE GIVEN TO DATE: 36
RAD ONC ARIA REFERENCE POINT ID: NORMAL
RAD ONC ARIA REFERENCE POINT SESSION DOSAGE GIVEN: 18

## 2023-12-20 PROCEDURE — 77336 RADIATION PHYSICS CONSULT: CPT | Mod: XU | Performed by: RADIOLOGY

## 2023-12-20 PROCEDURE — 77373 STRTCTC BDY RAD THER TX DLVR: CPT | Performed by: RADIOLOGY

## 2023-12-20 PROCEDURE — 77280 THER RAD SIMULAJ FIELD SMPL: CPT | Performed by: RADIOLOGY

## 2023-12-20 PROCEDURE — 77280 THER RAD SIMULAJ FIELD SMPL: CPT | Mod: 26 | Performed by: RADIOLOGY

## 2023-12-20 NOTE — PROCEDURES
DATE OF SERVICE: 12/20/2023    DIAGNOSIS:  Adenocarcinoma, lung (HCC)  Staging form: Lung, AJCC 8th Edition  - Clinical stage from 9/15/2022: Stage IIB (cT2a, cN1, cM0) - Signed by Clementine Howe M.D. on 9/15/2022       TREATMENT:  Radiation Therapy Episodes       Active Episodes       Radiation Therapy: SBRT (12/18/2023)                   Radiation Treatments         Plan Last Treated On Elapsed Days Fractions Treated Prescribed Fraction Dose (cGy) Prescribed Total Dose (cGy)    R Lung SBRT 12/20/2023 2 @ [id number] 2 of 3 1,800 5,400                  Reference Point Last Treated On Elapsed Days Most Recent Session Dose (cGy) Total Dose (cGy)    PTV 12/20/2023 2 @ [id number] 1,800 3,600                            STEREOTACTIC PROCEDURE NOTE:    Called by Truebeam machine to verify treatment parameters including:  treatment site, treatment dose, and treatment setup prior to stereotactic treatment..    Patient was placed in the treatment position with use of immobilization device and  laser guidance. CBCT images were acquired for target localization.  Images were reviewed in the axial, coronal, and saggital views and shifts were made as necessary to ensure that patient position matched simulation position.      Treatment delivered per  prescription.  The medical physicist was present throughout the set-up, verification and treatment delivery to oversee the procedure and ensure all parameters agreed with the computerized plan.    I have personally reviewed the relevant data, performed the target localization, and determined all relevant factors for this patient’s simulation.

## 2023-12-20 NOTE — ADDENDUM NOTE
Encounter addended by: Robert Deleon M.D. on: 12/20/2023 10:44 AM   Actions taken: Clinical Note Signed

## 2023-12-20 NOTE — RADIATION PLANNING NOTES
PATIENT NAME Babatunde Sullivan   PRIMARY PHYSICIAN Mariya Mills [id number]   REFERRING PHYSICIAN No ref. provider found 1965     DATE OF SERVICE: 12/5/2023    DIAGNOSIS:  Adenocarcinoma, lung (HCC)  Staging form: Lung, AJCC 8th Edition  - Clinical stage from 9/15/2022: Stage IIB (cT2a, cN1, cM0) - Signed by Clementine Howe M.D. on 9/15/2022         SPECIAL TREATMENT PROCEDURE NOTE:  Considerable additional effort required in the management of this case because of administration of Stereotactic Radiotherapy, which may result in increased normal tissue toxicity and require greater effort in contouring and treatment because of greater precision.

## 2023-12-20 NOTE — RADIATION PLANNING NOTES
Clinical Treatment Planning Note    DATE OF SERVICE: 12/5/2023    DIAGNOSIS:  Adenocarcinoma, lung (HCC)  Staging form: Lung, AJCC 8th Edition  - Clinical stage from 9/15/2022: Stage IIB (cT2a, cN1, cM0) - Signed by Clementine Howe M.D. on 9/15/2022         IMAGING REVIEWED:  [x] CT     [] MRI     [x] PET/CT     [] BONE SCAN     [] MAMMO     [] OTHER      TREATMENT INTENT:   [x] CURATIVE     [] MAINTENANCE     []  PALLIATIVE      []  SUPPORTIVE     []  PROPHYLACTIC     [] BENIGN     []  CONSOLIDATIVE      [] DEFINITIVE   []  OLOGIMETASTATIC      LINE OF TREATMENT:  [] ADJUVANT   [x] DEFINITIVE   [] NEOADJUVANT   [] RE-TREATMENT      TECHNIQUE PLANNED:  [] IMRT   [] 3D   [x] SBRT   [] SRS/SRT   [] HDR   [] ELECTRON       IMRT JUSTIFICATION:  []   An immediately adjacent area has been previously irradiated and abutting portals must be established with high precision.    []  Dose escalation is planned to deliver radiation doses in excess of those commonly utilized for similar tumors with conventional treatment.    []  The target volume is concave or convex, and the critical normal tissues are within or around that convexity or concavity.    []  The target volume is in close proximity to critical structures that must be protected.    []  The volume of interest must be covered with narrow margins to adequately protect  immediately adjacent structures.      FIELDS & BLOCKING:  [x] COMPLEX BLOCKS     []  = 3 TX AREAS     []  ARCS     []  CUSTOM SHEILD        []  SIMPLE BLOCK      CHEMOTHERAPY:  []  CONCURRENT     []  INDUCTION     [] SEQUENTIAL     []  <30 DAYS FROM XRT      NOTES:  OAR CONSTRAINTS: (GUIDELINES ONLY NOT ABSOLUTE)  Target Prescribed Coverage   PTV 95% of PTV covered by 100% (Gy) of RX Dose     PTV 99% of PTV covered by 90% (Gy) of RX Dose       TEJA Goal   Total Lung Vol. (cc) critical structure   Total Lung (cc) V12.5Gy < 1500cc   Total Lung (cc) V13.5Gy < 1000cc   Liver V21Gy < 700cc   Cord V22Gy <  0.35cc   Cord V14.5Gy <1.2cc   Cord Max Dose < 28Gy   Ipsilateral Brachial Plexus V27Gy < 3cc   Ipsilateral Brachial Plexus Max Dose < 32.5Gy   Esophagus V19.5Gy < 5cc   Esophagus Max Dose < 35Gy   Heart V32Gy < 15cc   Heart Max Dose < 38Gy   Great Vessels V47Gy < 10cc   Great Vessels Max Dose < 53Gy   Trachea/Large Bronchus V32Gy < 5cc   Trachea/Large Bronchus Max Dose < 40Gy   Small Bronchus V21Gy < 0.5cc   Small Bronchus Max Dose < 33Gy   Skin V36.5Gy < 10cc   Skin Max Dose < 38.5Gy   Ribs V45Gy < 5cc   Ribs Max Dose < 57Gy   *RTOG [id number]

## 2023-12-22 ENCOUNTER — HOSPITAL ENCOUNTER (OUTPATIENT)
Dept: RADIATION ONCOLOGY | Facility: MEDICAL CENTER | Age: 58
End: 2023-12-22

## 2023-12-22 LAB
CHEMOTHERAPY INFUSION START DATE: NORMAL
CHEMOTHERAPY INFUSION START DATE: NORMAL
CHEMOTHERAPY INFUSION STOP DATE: NORMAL
CHEMOTHERAPY RECORDS: 18
CHEMOTHERAPY RECORDS: 18
CHEMOTHERAPY RECORDS: 5400
CHEMOTHERAPY RECORDS: 5400
CHEMOTHERAPY RECORDS: NORMAL
DATE 1ST CHEMO CANCER: NORMAL
DATE 1ST CHEMO CANCER: NORMAL
RAD ONC ARIA COURSE LAST TREATMENT DATE: NORMAL
RAD ONC ARIA COURSE LAST TREATMENT DATE: NORMAL
RAD ONC ARIA COURSE TREATMENT ELAPSED DAYS: NORMAL
RAD ONC ARIA COURSE TREATMENT ELAPSED DAYS: NORMAL
RAD ONC ARIA REFERENCE POINT DOSAGE GIVEN TO DATE: 54
RAD ONC ARIA REFERENCE POINT DOSAGE GIVEN TO DATE: 54
RAD ONC ARIA REFERENCE POINT ID: NORMAL
RAD ONC ARIA REFERENCE POINT ID: NORMAL
RAD ONC ARIA REFERENCE POINT SESSION DOSAGE GIVEN: 18

## 2023-12-22 PROCEDURE — 77373 STRTCTC BDY RAD THER TX DLVR: CPT | Performed by: RADIOLOGY

## 2023-12-22 PROCEDURE — 77280 THER RAD SIMULAJ FIELD SMPL: CPT | Mod: 26 | Performed by: RADIOLOGY

## 2023-12-22 PROCEDURE — 77280 THER RAD SIMULAJ FIELD SMPL: CPT | Performed by: RADIOLOGY

## 2023-12-22 NOTE — PROCEDURES
DATE OF SERVICE: 12/22/2023    DIAGNOSIS:  Adenocarcinoma, lung (HCC)  Staging form: Lung, AJCC 8th Edition  - Clinical stage from 9/15/2022: Stage IIB (cT2a, cN1, cM0) - Signed by Clementine Howe M.D. on 9/15/2022       TREATMENT:  Radiation Therapy Episodes       Active Episodes       Radiation Therapy: SBRT (12/18/2023)                   Radiation Treatments         Plan Last Treated On Elapsed Days Fractions Treated Prescribed Fraction Dose (cGy) Prescribed Total Dose (cGy)    R Lung SBRT 12/22/2023 4 @ [id number] 3 of 3 1,800 5,400                  Reference Point Last Treated On Elapsed Days Most Recent Session Dose (cGy) Total Dose (cGy)    PTV 12/22/2023 4 @ [id number] 1,800 5,400                            STEREOTACTIC PROCEDURE NOTE:    Called by Truebeam machine to verify treatment parameters including:  treatment site, treatment dose, and treatment setup prior to stereotactic treatment..    Patient was placed in the treatment position with use of immobilization device and  laser guidance. CBCT images were acquired for target localization.  Images were reviewed in the axial, coronal, and saggital views and shifts were made as necessary to ensure that patient position matched simulation position.      Treatment delivered per  prescription.  The medical physicist was present throughout the set-up, verification and treatment delivery to oversee the procedure and ensure all parameters agreed with the computerized plan.    I have personally reviewed the relevant data, performed the target localization, and determined all relevant factors for this patient’s simulation.

## 2023-12-26 ENCOUNTER — APPOINTMENT (OUTPATIENT)
Dept: RADIOLOGY | Facility: MEDICAL CENTER | Age: 58
End: 2023-12-26
Attending: RADIOLOGY
Payer: COMMERCIAL

## 2023-12-26 DIAGNOSIS — C34.91 ADENOCARCINOMA OF RIGHT LUNG (HCC): ICD-10-CM

## 2023-12-26 PROCEDURE — 70553 MRI BRAIN STEM W/O & W/DYE: CPT

## 2023-12-26 PROCEDURE — 700117 HCHG RX CONTRAST REV CODE 255: Performed by: RADIOLOGY

## 2023-12-26 PROCEDURE — A9579 GAD-BASE MR CONTRAST NOS,1ML: HCPCS | Performed by: RADIOLOGY

## 2023-12-26 RX ADMIN — GADOTERIDOL 20 ML: 279.3 INJECTION, SOLUTION INTRAVENOUS at 16:01

## 2023-12-26 NOTE — ADDENDUM NOTE
Encounter addended by: Cely Andrade, Med Ass't on: 12/26/2023 12:43 PM   Actions taken: Pend clinical note

## 2023-12-26 NOTE — RADIATION COMPLETION NOTES
END OF TREATMENT SUMMARY    Patient name:  Babatunde Sullivan    Primary Physician:  Mariya Mills M.D. MRN: [id number]  CSN: [id number]   Referring physician:  Clementine [place] : 1965, 58 y.o.       TREATMENT SUMMARY:        Course First Treatment Date 2023    Course Last Treatment Date 2023   Course Elapsed Days 4 @ [id number]   No results found for requested labs within last 90 days.     Radiation Therapy Episodes       Active Episodes       Radiation Therapy: SBRT (2023)                   Radiation Treatments         Plan Last Treated On Elapsed Days Fractions Treated Prescribed Fraction Dose (cGy) Prescribed Total Dose (cGy)    R Lung SBRT 2023 4 @ [id number] 3 of 3 1,800 5,400                  Reference Point Last Treated On Elapsed Days Most Recent Session Dose (cGy) Total Dose (cGy)    PTV 2023 4 @ [id number] -- 5,400                                     STAGE:   Adenocarcinoma, lung (HCC)  Staging form: Lung, AJCC 8th Edition  - Clinical stage from 9/15/2022: Stage IIB (cT2a, cN1, cM0) - Signed by Clementine Howe M.D. on 9/15/2022       TREATMENT INDICATION: Definitive SBRT for oligo recurrent disease     CONCURRENT SYSTEMIC TREATMENT:   None     RT COURSE DISCONTINUED EARLY:   No     PATIENT EXPERIENCE:        No data to display                 FOLLOW-UP PLAN:   6 Weeks     COMMENT:          ANATOMIC TARGET SUMMARY    ANATOMIC TARGET MODALITY TECHNIQUE   Right upper lobe primary lesion External beam, photons SBRT            COMMENT:         DIAGRAMS:      DOSE VOLUME HISTOGRAMS:

## 2024-01-04 ENCOUNTER — TELEPHONE (OUTPATIENT)
Dept: HEALTH INFORMATION MANAGEMENT | Facility: OTHER | Age: 59
End: 2024-01-04
Payer: COMMERCIAL

## 2024-01-11 NOTE — ADDENDUM NOTE
Encounter addended by: Robert Deleon M.D. on: 1/11/2024 3:09 PM   Actions taken: Clinical Note Signed

## 2024-01-31 ASSESSMENT — LIFESTYLE VARIABLES
TOBACCO_USE: NO
SMOKING_STATUS: NO

## 2024-02-02 ENCOUNTER — HOSPITAL ENCOUNTER (OUTPATIENT)
Dept: RADIATION ONCOLOGY | Facility: MEDICAL CENTER | Age: 59
End: 2024-02-29
Attending: RADIOLOGY
Payer: COMMERCIAL

## 2024-02-02 DIAGNOSIS — C34.91 ADENOCARCINOMA OF RIGHT LUNG (HCC): ICD-10-CM

## 2024-02-02 NOTE — PROGRESS NOTES
This visit is being conducted by telephone. This telephone visit was initiated by the patient and they verbally consented.  Patient at home in [facility].      Reason for Call: Posttreatment follow-up    Treatment History:     Radiation Oncology          12/20/2023 12/22/2023   Aria Course Treatment Dates   Course First Treatment Date 12/18/2023 12/18/2023    Course Last Treatment Date 12/20/2023 12/22/2023    Aria Treatment Summary   R Lung SBRT  Plan from Course C1 RUL Lung   Fraction 2 of 3 3 of 3    3 of 3   Elapsed Course Days 2 @ [id number] 4 @ [id number]    4 @ [id number]   Prescribed Fraction Dose 1,800 cGy 1,800 cGy    1,800 cGy   Prescribed Total Dose 5,400 cGy 5,400 cGy    5,400 cGy   PTV  Reference Point from Course C1 RUL Lung   Elapsed Course Days 2 @ [id number] 4 @ [id number]    4 @ [id number]   Session Dose 1,800 cGy 1,800 cGy    --   Total Dose 3,600 cGy 5,400 cGy    5,400 cGy      Details          More values are hidden. Newest values shown. Go to activity for more data.                HPI:    Subjective    Mr. Sullivan is now a 58-year-old gentleman who comes to discuss treatment options for his recurrent lung cancer.  In brief, his recent history dates back to about late 2022 when he was diagnosed initially with lung adenocarcinoma.  He was clinically felt to have T2N1 disease, and was treated with neoadjuvant chemoimmunotherapy followed by lobectomy with Dr. Ganser.  Surgery was completed in February 2023.  Final pathology was with residual tumor, ypT3 ypN1.     Thereafter, he had been on surveillance, and a CT of the chest in September noticed a new ovoid nodule in the posterior right upper lobe measuring about 9 mm.  This was monitored and was noted to be enlarged now measuring 14 mm in November concerning for recurrence.  He saw Dr. Howe for follow-up, and was discussed with surgery as well.  However, because of prior right thoracotomy used for a distant diagnosis of  metastatic testicular cancer in which she had partial right lung resection, he is no longer surgical candidate for further right lung resections.  He underwent a bronchoscopy and a right upper lobe nodule biopsy that confirmed recurrent adenocarcinoma with enteric features, consistent with a recurrence of his initial lung cancer.  PFTs are pending, but given that he is not a surgical candidate, he now comes to me to discuss radiation options.           Interval History:   2/2/2024 he is doing very well.  No major cardiopulmonary complaints.  He unfortunately had an episode of food poisoning a few weeks ago that he has recovered from.  Otherwise no issues at all.    Labs / Images Reviewed:   MR-BRAIN-WITH & W/O    Result Date: 12/27/2023  1.  There is no evidence intracranial metastasis. 2.  Unremarkable pre and postcontrast MR examination of the brain except for a few punctate nonspecific white matter T2 hyperintensities.    GI-[id number] BASE TO MID-THIGH    Result Date: 12/5/2023  1.  Hypermetabolic nodule at the RIGHT lung apex consistent with recurrent tumor in this patient with history of lung cancer. 2.  No other evidence of metastatic disease. 3.  Postoperative changes as described.      Assessment:   Adenocarcinoma, lung (HCC)  Staging form: Lung, AJCC 8th Edition  - Clinical stage from 9/15/2022: Stage IIB (cT2a, cN1, cM0) - Signed by Clementine Howe M.D. on 9/15/2022      Cancer Status:   Disease Stable    Plan:   We will proceed with routine surveillance given that he is done with a stereotactic radiation.  Anticipate first set of scans and follow-up in about 6 to 8 weeks.  I will plan to see him in the office thereafter.  At that point, he can then continue to follow-up with either medical oncology or myself thereafter.    Time Spent on Call: 5 minutes      Time Spent in Preparation: 5 minutes    Total Time Spent: 10 minutes    Robert Deleon M.D.

## 2024-03-08 ENCOUNTER — HOSPITAL ENCOUNTER (OUTPATIENT)
Dept: RADIOLOGY | Facility: MEDICAL CENTER | Age: 59
End: 2024-03-08
Attending: RADIOLOGY
Payer: COMMERCIAL

## 2024-03-08 DIAGNOSIS — C34.91 ADENOCARCINOMA OF RIGHT LUNG (HCC): ICD-10-CM

## 2024-03-08 PROCEDURE — 71250 CT THORAX DX C-: CPT

## 2024-03-08 ASSESSMENT — LIFESTYLE VARIABLES
SMOKING_STATUS: NO
TOBACCO_USE: NO

## 2024-03-11 ENCOUNTER — HOSPITAL ENCOUNTER (OUTPATIENT)
Dept: RADIATION ONCOLOGY | Facility: MEDICAL CENTER | Age: 59
End: 2024-03-11
Attending: RADIOLOGY
Payer: COMMERCIAL

## 2024-03-11 DIAGNOSIS — C34.91 ADENOCARCINOMA OF RIGHT LUNG (HCC): ICD-10-CM

## 2024-03-11 NOTE — PROGRESS NOTES
This visit is being conducted by telephone. This telephone visit was initiated by the patient and they verbally consented.  Patient at home in [facility].      Reason for Call:  Post tx FU     Treatment History:     Radiation Oncology          12/20/2023 12/22/2023   Aria Course Treatment Dates   Course First Treatment Date 12/18/2023 12/18/2023    Course Last Treatment Date 12/20/2023 12/22/2023    Aria Treatment Summary   R Lung SBRT  Plan from Course C1 RUL Lung   Fraction 2 of 3 3 of 3    3 of 3   Elapsed Course Days 2 @ [id number] 4 @ [id number]    4 @ [id number]   Prescribed Fraction Dose 1,800 cGy 1,800 cGy    1,800 cGy   Prescribed Total Dose 5,400 cGy 5,400 cGy    5,400 cGy   PTV  Reference Point from Course C1 RUL Lung   Elapsed Course Days 2 @ [id number] 4 @ [id number]    4 @ [id number]   Session Dose 1,800 cGy 1,800 cGy    --   Total Dose 3,600 cGy 5,400 cGy    5,400 cGy      Details          More values are hidden. Newest values shown. Go to activity for more data.                HPI:    Subjective    Mr. Sullivan is now a 58-year-old gentleman who comes to discuss treatment options for his recurrent lung cancer.  In brief, his recent history dates back to about late 2022 when he was diagnosed initially with lung adenocarcinoma.  He was clinically felt to have T2N1 disease, and was treated with neoadjuvant chemoimmunotherapy followed by lobectomy with Dr. Ganser.  Surgery was completed in February 2023.  Final pathology was with residual tumor, ypT3 ypN1.     Thereafter, he had been on surveillance, and a CT of the chest in September noticed a new ovoid nodule in the posterior right upper lobe measuring about 9 mm.  This was monitored and was noted to be enlarged now measuring 14 mm in November concerning for recurrence.  He saw Dr. Howe for follow-up, and was discussed with surgery as well.  However, because of prior right thoracotomy used for a distant diagnosis of metastatic  testicular cancer in which she had partial right lung resection, he is no longer surgical candidate for further right lung resections.  He underwent a bronchoscopy and a right upper lobe nodule biopsy that confirmed recurrent adenocarcinoma with enteric features, consistent with a recurrence of his initial lung cancer.  PFTs are pending, but given that he is not a surgical candidate, he now comes to me to discuss radiation options.     He feels relatively well, does not have any major cardiopulmonary complaints, no distant bony pain, no obvious neurologic symptoms.        Interval History:   3/11/24 He is doing well.  He tolerated radiation well.  No major acute problems.  He did have a cold last week which she is recovering from.  Otherwise, he is looking forward to ongoing follow-up and review of his imaging.    Labs / Images Reviewed:   CT-CHEST (THORAX) W/O    Result Date: 3/8/2024  1.  Right upper lobe nodule is significantly smaller suggesting positive treatment response with size measurements above. 2.  New groundglass and mild consolidative opacity in the anterior right upper lobe probably represents pneumonitis. 3.  Mild interval enlargement of a lower right paratracheal lymph node and probable slight interval enlargement of a couple tiny additional mediastinal lymph nodes. Developing chiquita metastases not excluded but this could also be reactive. Attention on follow-up. Fleischner Society pulmonary nodule recommendations: Not Applicable       Assessment:   Adenocarcinoma, lung (HCC)  Staging form: Lung, AJCC 8th Edition  - Clinical stage from 9/15/2022: Stage IIB (cT2a, cN1, cM0) - Signed by Clementine Howe M.D. on 9/15/2022      Cancer Status:   Disease Partial Response    Plan:   I reviewed his scans personally.  They show an excellent response of the treated nodule.  He does have some pneumonitis in the area which is to be expected.  His scattered mediastinal nodes are marginally larger, though that  could certainly be in keeping with his URI that he had last week.  At any rate, the scan is reassuring, we will continue surveillance follow-up with another CT chest and follow-up in 3 months.    Time Spent on Call: 7 min      Time Spent in Preparation: 15 min    Total Time Spent: 22 min    Robert Deleon M.D.

## 2024-04-21 NOTE — RESEARCH NOTE
Screening/Consent note:    Participation in the [employer] clinical trial was discussed with Patient today. All aspects of the study purpose and procedures were explained.  He was given ample time to review the consent and all questions were answered to his satisfaction. Patient aware that the clinical trial is voluntary and he may withdraw consent at any time without affecting the level of care they receive.  Subject signed consent without coercion and undue influence and was given a copy of the signed consent. No study-related procedures took place prior to consenting and all assessments were conducted per protocol.  Inclusion criteria:    Age ?30 years within 30 days of enrollment yes   Able and willing to provide blood samples per protocol yes  Able to comprehend and willing to sign and date the informed consent and HIPAA Authorization documents- yes    Able and willing to allow their retrospective and prospective data to be utilized for study purposes yes   Site/Sponsor has access to subject’s health information including past diagnoses, medications, and procedures and a minimum of 1 encounter in the site’s EHR system in the past 12 months yes -   Lung Group:  Subject must be diagnosed with pathologically-confirmed lung cancer (i.e., adenocarcinoma, squamous cell carcinoma, large cell carcinoma or small cell carcinoma) or have a presumptive diagnosis of or high clinical suspicion for the same by imaging (e.g., CT, MRI or PET) and have not yet received any cancer treatment (including but not limited to surgery, chemotherapy, and/or radiation) yes       Exclusion criteria:  Any history of solid organ or bone marrow transplantation no  Any physical trauma or surgery requiring inpatient overnight hospitalization in the 30 days preceding enrollment no  Received a blood transfusion in the 30 days preceding enrollment no  A medical condition that, in the opinion of the Investigator, should preclude enrollment in the  study no   Known to be pregnant no  Any therapy for cancer, including but not limited to surgery, chemotherapy, biologic therapy, immunotherapy, and/or radiation therapy in the 5 years preceding enrollment no   Participated or currently participating in a clinical research study in which an experimental medication has been administered during the 30 days preceding enrollment no   Participated or currently participating in another [email or web address]-sponsored clinical study no      Lung Group: Any previous cancer diagnosis (with the exception of basal cell skin cancer or squamous cell skin cancer) in the 5 years preceding enrollment, apart from the current lung cancer diagnosis  OR recurrence of the same primary cancer within any timeframe;  OR concurrent diagnosis of multiple primary cancers within any timeframe no   Patient consented on ICF version 3, protocol version 2    Group: Lung  Cancer addendum: 1  Subject has a confirmed cancer diagnosis    Patient meets all eligibility criteria    Subject did not experience any adverse events or serious adverse events during today's study visit or blood draw    Study specimen was drawn prior to standard care cancer treatment            pt with PMH of COPD everyday stopped 30 years ago steroids x 3 days without improvement of symptoms.    Resp distress NCAT PERRLA EOMI neck supple non tender normal wob cta bl tachypnea, wheezing, abdomen s nt nd no rebound no guarding WWPx4 neuro non focal.    COPD exacerbation most likely 2/2 infxn

## 2024-05-14 RX ORDER — HYDROCHLOROTHIAZIDE 50 MG/1
50 TABLET ORAL 2 TIMES DAILY
Qty: 180 TABLET | Refills: 0 | Status: SHIPPED | OUTPATIENT
Start: 2024-05-14

## 2024-06-14 ENCOUNTER — HOSPITAL ENCOUNTER (OUTPATIENT)
Dept: RADIOLOGY | Facility: MEDICAL CENTER | Age: 59
End: 2024-06-14
Attending: RADIOLOGY
Payer: COMMERCIAL

## 2024-06-14 DIAGNOSIS — C34.91 ADENOCARCINOMA OF RIGHT LUNG (HCC): ICD-10-CM

## 2024-06-14 PROCEDURE — 71250 CT THORAX DX C-: CPT

## 2024-06-17 DIAGNOSIS — Z00.00 ROUTINE HEALTH MAINTENANCE: ICD-10-CM

## 2024-06-17 DIAGNOSIS — Z12.5 PROSTATE CANCER SCREENING: ICD-10-CM

## 2024-06-19 ENCOUNTER — HOSPITAL ENCOUNTER (OUTPATIENT)
Dept: LAB | Facility: MEDICAL CENTER | Age: 59
End: 2024-06-19
Attending: STUDENT IN AN ORGANIZED HEALTH CARE EDUCATION/TRAINING PROGRAM
Payer: COMMERCIAL

## 2024-06-19 DIAGNOSIS — Z00.00 ROUTINE HEALTH MAINTENANCE: ICD-10-CM

## 2024-06-19 DIAGNOSIS — Z12.5 PROSTATE CANCER SCREENING: ICD-10-CM

## 2024-06-19 LAB
ALBUMIN SERPL BCP-MCNC: 4 G/DL (ref 3.2–4.9)
ALBUMIN/GLOB SERPL: 1.3 G/DL
ALP SERPL-CCNC: 68 U/L (ref 30–99)
ALT SERPL-CCNC: 22 U/L (ref 2–50)
ANION GAP SERPL CALC-SCNC: 9 MMOL/L (ref 7–16)
AST SERPL-CCNC: 24 U/L (ref 12–45)
BILIRUB SERPL-MCNC: 0.5 MG/DL (ref 0.1–1.5)
BUN SERPL-MCNC: 16 MG/DL (ref 8–22)
CALCIUM ALBUM COR SERPL-MCNC: 8.9 MG/DL (ref 8.5–10.5)
CALCIUM SERPL-MCNC: 8.9 MG/DL (ref 8.5–10.5)
CHLORIDE SERPL-SCNC: 101 MMOL/L (ref 96–112)
CHOLEST SERPL-MCNC: 169 MG/DL (ref 100–199)
CO2 SERPL-SCNC: 31 MMOL/L (ref 20–33)
CREAT SERPL-MCNC: 0.67 MG/DL (ref 0.5–1.4)
ERYTHROCYTE [DISTWIDTH] IN BLOOD BY AUTOMATED COUNT: 47.6 FL (ref 35.9–50)
FASTING STATUS PATIENT QL REPORTED: NORMAL
GFR SERPLBLD CREATININE-BSD FMLA CKD-EPI: 109 ML/MIN/1.73 M 2
GLOBULIN SER CALC-MCNC: 3 G/DL (ref 1.9–3.5)
GLUCOSE SERPL-MCNC: 106 MG/DL (ref 65–99)
HCT VFR BLD AUTO: 41.6 % (ref 42–52)
HDLC SERPL-MCNC: 34 MG/DL
HGB BLD-MCNC: 14.6 G/DL (ref 14–18)
LDLC SERPL CALC-MCNC: 102 MG/DL
MCH RBC QN AUTO: 34.3 PG (ref 27–33)
MCHC RBC AUTO-ENTMCNC: 35.1 G/DL (ref 32.3–36.5)
MCV RBC AUTO: 97.7 FL (ref 81.4–97.8)
PLATELET # BLD AUTO: 179 K/UL (ref 164–446)
PMV BLD AUTO: 10.5 FL (ref 9–12.9)
POTASSIUM SERPL-SCNC: 3.6 MMOL/L (ref 3.6–5.5)
PROT SERPL-MCNC: 7 G/DL (ref 6–8.2)
PSA SERPL-MCNC: 0.18 NG/ML (ref 0–4)
RBC # BLD AUTO: 4.26 M/UL (ref 4.7–6.1)
SODIUM SERPL-SCNC: 141 MMOL/L (ref 135–145)
TRIGL SERPL-MCNC: 167 MG/DL (ref 0–149)
WBC # BLD AUTO: 6.1 K/UL (ref 4.8–10.8)

## 2024-06-19 PROCEDURE — 80061 LIPID PANEL: CPT

## 2024-06-19 PROCEDURE — 80053 COMPREHEN METABOLIC PANEL: CPT

## 2024-06-19 PROCEDURE — 84153 ASSAY OF PSA TOTAL: CPT

## 2024-06-19 PROCEDURE — 36415 COLL VENOUS BLD VENIPUNCTURE: CPT

## 2024-06-19 PROCEDURE — 85027 COMPLETE CBC AUTOMATED: CPT

## 2024-06-20 ENCOUNTER — HOSPITAL ENCOUNTER (OUTPATIENT)
Dept: RADIATION ONCOLOGY | Facility: MEDICAL CENTER | Age: 59
End: 2024-06-20
Attending: RADIOLOGY
Payer: COMMERCIAL

## 2024-06-20 DIAGNOSIS — C34.91 ADENOCARCINOMA OF RIGHT LUNG (HCC): ICD-10-CM

## 2024-06-20 PROCEDURE — 99214 OFFICE O/P EST MOD 30 MIN: CPT | Performed by: RADIOLOGY

## 2024-06-20 ASSESSMENT — ENCOUNTER SYMPTOMS
COUGH: 0
SHORTNESS OF BREATH: 0
NEUROLOGICAL NEGATIVE: 1
FEVER: 0
CHILLS: 0

## 2024-06-20 NOTE — PROGRESS NOTES
Virtual Visit: Established Patient   This visit was conducted via Zoom using secure and encrypted videoconferencing technology.   The patient was in a private location outside of their home in the state [facility].    The patient's identity was confirmed and verbal consent was obtained for this virtual visit.    Subjective:   CC: No chief complaint on file.    Babatunde Sullivan is a 58 y.o. male presenting for evaluation and management of:    Mr. Sullivan is now a 58-year-old gentleman who comes to discuss treatment options for his recurrent lung cancer.  In brief, his recent history dates back to about late 2022 when he was diagnosed initially with lung adenocarcinoma.  He was clinically felt to have T2N1 disease, and was treated with neoadjuvant chemoimmunotherapy followed by lobectomy with Dr. Ganser.  Surgery was completed in February 2023.  Final pathology was with residual tumor, ypT3 ypN1.     Thereafter, he had been on surveillance, and a CT of the chest in September noticed a new ovoid nodule in the posterior right upper lobe measuring about 9 mm.  This was monitored and was noted to be enlarged now measuring 14 mm in November concerning for recurrence.  He saw Dr. Howe for follow-up, and was discussed with surgery as well.  However, because of prior right thoracotomy used for a distant diagnosis of metastatic testicular cancer in which she had partial right lung resection, he is no longer surgical candidate for further right lung resections.  He underwent a bronchoscopy and a right upper lobe nodule biopsy that confirmed recurrent adenocarcinoma with enteric features, consistent with a recurrence of his initial lung cancer.  PFTs are pending, but given that he is not a surgical candidate, he now comes to me to discuss radiation options.     He feels relatively well, does not have any major cardiopulmonary complaints, no distant bony pain, no obvious neurologic symptoms.     3/11/24 He is doing well.  He  tolerated radiation well.  No major acute problems.  He did have a cold last week which he is recovering from.  Otherwise, he is looking forward to ongoing follow-up and review of his imaging.    6/20/24 continues to do well.  No major acute issues currently.  No cardiopulmonary complaints currently.  No recent URIs.  We are reviewing his recent imaging today virtually.     ROS   Review of Systems   Constitutional:  Negative for chills, fever and malaise/fatigue.   Respiratory:  Negative for cough and shortness of breath.    Cardiovascular:  Negative for chest pain.   Neurological: Negative.          Current medicines (including changes today)  Current Outpatient Medications   Medication Sig Dispense Refill    hydroCHLOROthiazide 50 MG Tab Take 1 Tablet by mouth 2 times a day. 180 Tablet 0    acetaminophen (TYLENOL) 500 MG Tab Take 500-1,000 mg by mouth every 6 hours as needed.      Zinc Citrate (ZINC EXTRA STRENGTH PO) Take 50 mg by mouth every day.      Multiple Vitamin (MULTIVITAMINS PO) Take 1 Tablet by mouth every day.      PSYLLIUM PO Take 5 Capsules by mouth 1 time a day as needed.       No current facility-administered medications for this encounter.       Patient Active Problem List    Diagnosis Date Noted    Mediastinal adenopathy 11/10/2023    Right inguinal hernia 07/12/2023    History of testicular cancer 07/12/2023    Acute pain of right shoulder 07/12/2023    Adenocarcinoma, lung (HCC) 09/15/2022    Pulmonary nodule 1 cm or greater in diameter 09/03/2022    Thrombosis 09/01/2022        Objective:   There were no vitals taken for this visit.    Physical Exam:  Constitutional: Alert, no distress, well-groomed.  Skin: No rashes in visible areas.  Eye: Round. Conjunctiva clear, lids normal. No icterus.   ENMT: Lips pink without lesions, good dentition, moist mucous membranes. Phonation normal.  Neck: No masses, no thyromegaly. Moves freely without pain.  Respiratory: Unlabored respiratory effort, no cough  or audible wheeze  Psych: Alert and oriented x3, normal affect and mood.     Assessment and Plan:   The following treatment plan was discussed:     Recurrent lung adenocarcinoma: He is doing quite well.  His scans are overall fairly reassuring.  The right apical changes all appear to be treatment related.  He does have a small 5 mm nodule more inferiorly that we will need to be monitored.  The mediastinal nodes all appear to be stable.  I reviewed his scans virtually with him today over our visit.  At this point that nodule is too small to consider any further workup.  We will plan to continue surveillance with another CT chest and a virtual follow-up in about 3 months.  All questions answered.

## 2024-07-29 RX ORDER — HYDROCHLOROTHIAZIDE 50 MG/1
50 TABLET ORAL 2 TIMES DAILY
Qty: 60 TABLET | Refills: 0 | Status: SHIPPED | OUTPATIENT
Start: 2024-07-29

## 2024-07-30 LAB
CHEMOTHERAPY INFUSION START DATE: NORMAL
CHEMOTHERAPY INFUSION STOP DATE: NORMAL
CHEMOTHERAPY RECORDS: 18
CHEMOTHERAPY RECORDS: 5400
CHEMOTHERAPY RECORDS: NORMAL
DATE 1ST CHEMO CANCER: NORMAL
RAD ONC ARIA COURSE LAST TREATMENT DATE: NORMAL
RAD ONC ARIA COURSE TREATMENT ELAPSED DAYS: NORMAL
RAD ONC ARIA REFERENCE POINT DOSAGE GIVEN TO DATE: 54
RAD ONC ARIA REFERENCE POINT ID: NORMAL

## 2024-08-05 NOTE — TELEPHONE ENCOUNTER
Received request via: Patient    Was the patient seen in the last year in this department? Yes    Does the patient have an active prescription (recently filled or refills available) for medication(s) requested? Yes,called to cancel RX    Pharmacy Name: [facility]    Does the patient have [facility] Plus and need 100 day supply (blood pressure, diabetes and cholesterol meds only)? Patient does not have SCP

## 2024-08-09 RX ORDER — HYDROCHLOROTHIAZIDE 50 MG/1
50 TABLET ORAL 2 TIMES DAILY
Qty: 60 TABLET | Refills: 0 | Status: SHIPPED | OUTPATIENT
Start: 2024-08-09 | End: 2024-08-21 | Stop reason: SDUPTHER

## 2024-08-18 SDOH — ECONOMIC STABILITY: FOOD INSECURITY: WITHIN THE PAST 12 MONTHS, YOU WORRIED THAT YOUR FOOD WOULD RUN OUT BEFORE YOU GOT MONEY TO BUY MORE.: NEVER TRUE

## 2024-08-18 SDOH — HEALTH STABILITY: PHYSICAL HEALTH: ON AVERAGE, HOW MANY DAYS PER WEEK DO YOU ENGAGE IN MODERATE TO STRENUOUS EXERCISE (LIKE A BRISK WALK)?: 5 DAYS

## 2024-08-18 SDOH — ECONOMIC STABILITY: FOOD INSECURITY: WITHIN THE PAST 12 MONTHS, THE FOOD YOU BOUGHT JUST DIDN'T LAST AND YOU DIDN'T HAVE MONEY TO GET MORE.: NEVER TRUE

## 2024-08-18 SDOH — ECONOMIC STABILITY: INCOME INSECURITY: IN THE LAST 12 MONTHS, WAS THERE A TIME WHEN YOU WERE NOT ABLE TO PAY THE MORTGAGE OR RENT ON TIME?: NO

## 2024-08-18 SDOH — HEALTH STABILITY: PHYSICAL HEALTH: ON AVERAGE, HOW MANY MINUTES DO YOU ENGAGE IN EXERCISE AT THIS LEVEL?: 10 MIN

## 2024-08-18 SDOH — ECONOMIC STABILITY: INCOME INSECURITY: HOW HARD IS IT FOR YOU TO PAY FOR THE VERY BASICS LIKE FOOD, HOUSING, MEDICAL CARE, AND HEATING?: NOT HARD AT ALL

## 2024-08-18 ASSESSMENT — SOCIAL DETERMINANTS OF HEALTH (SDOH)
HOW OFTEN DO YOU HAVE SIX OR MORE DRINKS ON ONE OCCASION: NEVER
DO YOU BELONG TO ANY CLUBS OR ORGANIZATIONS SUCH AS CHURCH GROUPS UNIONS, FRATERNAL OR ATHLETIC GROUPS, OR SCHOOL GROUPS?: YES
HOW OFTEN DO YOU HAVE A DRINK CONTAINING ALCOHOL: NEVER
WITHIN THE PAST 12 MONTHS, YOU WORRIED THAT YOUR FOOD WOULD RUN OUT BEFORE YOU GOT THE MONEY TO BUY MORE: NEVER TRUE
HOW MANY DRINKS CONTAINING ALCOHOL DO YOU HAVE ON A TYPICAL DAY WHEN YOU ARE DRINKING: PATIENT DOES NOT DRINK
HOW OFTEN DO YOU GET TOGETHER WITH FRIENDS OR RELATIVES?: NEVER
HOW OFTEN DO YOU ATTEND CHURCH OR RELIGIOUS SERVICES?: MORE THAN 4 TIMES PER YEAR
IN A TYPICAL WEEK, HOW MANY TIMES DO YOU TALK ON THE PHONE WITH FAMILY, FRIENDS, OR NEIGHBORS?: NEVER
HOW HARD IS IT FOR YOU TO PAY FOR THE VERY BASICS LIKE FOOD, HOUSING, MEDICAL CARE, AND HEATING?: NOT HARD AT ALL
IN A TYPICAL WEEK, HOW MANY TIMES DO YOU TALK ON THE PHONE WITH FAMILY, FRIENDS, OR NEIGHBORS?: NEVER
DO YOU BELONG TO ANY CLUBS OR ORGANIZATIONS SUCH AS CHURCH GROUPS UNIONS, FRATERNAL OR ATHLETIC GROUPS, OR SCHOOL GROUPS?: YES
HOW OFTEN DO YOU ATTEND CHURCH OR RELIGIOUS SERVICES?: MORE THAN 4 TIMES PER YEAR
HOW OFTEN DO YOU ATTENT MEETINGS OF THE CLUB OR ORGANIZATION YOU BELONG TO?: MORE THAN 4 TIMES PER YEAR
HOW OFTEN DO YOU GET TOGETHER WITH FRIENDS OR RELATIVES?: NEVER
IN THE PAST 12 MONTHS, HAS THE ELECTRIC, GAS, OIL, OR WATER COMPANY THREATENED TO SHUT OFF SERVICE IN YOUR HOME?: NO
HOW OFTEN DO YOU ATTENT MEETINGS OF THE CLUB OR ORGANIZATION YOU BELONG TO?: MORE THAN 4 TIMES PER YEAR

## 2024-08-18 ASSESSMENT — LIFESTYLE VARIABLES
AUDIT-C TOTAL SCORE: 0
HOW MANY STANDARD DRINKS CONTAINING ALCOHOL DO YOU HAVE ON A TYPICAL DAY: PATIENT DOES NOT DRINK
SKIP TO QUESTIONS 9-10: 1
HOW OFTEN DO YOU HAVE SIX OR MORE DRINKS ON ONE OCCASION: NEVER
HOW OFTEN DO YOU HAVE A DRINK CONTAINING ALCOHOL: NEVER

## 2024-08-21 ENCOUNTER — APPOINTMENT (OUTPATIENT)
Dept: MEDICAL GROUP | Facility: PHYSICIAN GROUP | Age: 59
End: 2024-08-21
Payer: COMMERCIAL

## 2024-08-21 VITALS
BODY MASS INDEX: 34.6 KG/M2 | WEIGHT: 284.1 LBS | HEART RATE: 67 BPM | DIASTOLIC BLOOD PRESSURE: 78 MMHG | OXYGEN SATURATION: 98 % | SYSTOLIC BLOOD PRESSURE: 144 MMHG | HEIGHT: 76 IN | TEMPERATURE: 97.2 F

## 2024-08-21 DIAGNOSIS — R73.01 ELEVATED FASTING GLUCOSE: ICD-10-CM

## 2024-08-21 DIAGNOSIS — R10.31 RLQ ABDOMINAL PAIN: ICD-10-CM

## 2024-08-21 DIAGNOSIS — M79.89 LEG SWELLING: ICD-10-CM

## 2024-08-21 DIAGNOSIS — Z85.47 HISTORY OF TESTICULAR CANCER: ICD-10-CM

## 2024-08-21 DIAGNOSIS — J34.89 NASAL OBSTRUCTION: ICD-10-CM

## 2024-08-21 DIAGNOSIS — Z00.00 WELLNESS EXAMINATION: ICD-10-CM

## 2024-08-21 DIAGNOSIS — F32.0 CURRENT MILD EPISODE OF MAJOR DEPRESSIVE DISORDER WITHOUT PRIOR EPISODE (HCC): ICD-10-CM

## 2024-08-21 PROBLEM — Z85.118 HISTORY OF LUNG CANCER: Status: ACTIVE | Noted: 2022-09-15

## 2024-08-21 PROBLEM — Z85.118 HISTORY OF LUNG CANCER: Chronic | Status: ACTIVE | Noted: 2022-09-15

## 2024-08-21 PROCEDURE — 96160 PT-FOCUSED HLTH RISK ASSMT: CPT | Performed by: STUDENT IN AN ORGANIZED HEALTH CARE EDUCATION/TRAINING PROGRAM

## 2024-08-21 PROCEDURE — 99214 OFFICE O/P EST MOD 30 MIN: CPT | Mod: 25 | Performed by: STUDENT IN AN ORGANIZED HEALTH CARE EDUCATION/TRAINING PROGRAM

## 2024-08-21 PROCEDURE — 3077F SYST BP >= 140 MM HG: CPT | Performed by: STUDENT IN AN ORGANIZED HEALTH CARE EDUCATION/TRAINING PROGRAM

## 2024-08-21 PROCEDURE — 99396 PREV VISIT EST AGE 40-64: CPT | Performed by: STUDENT IN AN ORGANIZED HEALTH CARE EDUCATION/TRAINING PROGRAM

## 2024-08-21 PROCEDURE — 3078F DIAST BP <80 MM HG: CPT | Performed by: STUDENT IN AN ORGANIZED HEALTH CARE EDUCATION/TRAINING PROGRAM

## 2024-08-21 RX ORDER — FUROSEMIDE 20 MG
20 TABLET ORAL
Qty: 30 TABLET | Refills: 2 | Status: SHIPPED | OUTPATIENT
Start: 2024-08-21

## 2024-08-21 RX ORDER — BUPROPION HYDROCHLORIDE 150 MG/1
150 TABLET ORAL EVERY MORNING
Qty: 30 TABLET | Refills: 1 | Status: SHIPPED | OUTPATIENT
Start: 2024-08-21

## 2024-08-21 RX ORDER — HYDROCHLOROTHIAZIDE 50 MG/1
50 TABLET ORAL DAILY
Qty: 90 TABLET | Refills: 3 | Status: SHIPPED | OUTPATIENT
Start: 2024-08-21

## 2024-08-21 ASSESSMENT — PATIENT HEALTH QUESTIONNAIRE - PHQ9
CLINICAL INTERPRETATION OF PHQ2 SCORE: 1
5. POOR APPETITE OR OVEREATING: 2 - MORE THAN HALF THE DAYS
SUM OF ALL RESPONSES TO PHQ QUESTIONS 1-9: 8

## 2024-08-21 ASSESSMENT — FIBROSIS 4 INDEX: FIB4 SCORE: 1.6

## 2024-08-21 NOTE — PROGRESS NOTES
"Subjective:     CC: annual visit      HPI:   Babatunde Sullivan is a 58 y.o. male who presents for an annual exam. He is feeling well and has no complaints.    History of testicular cancer  History of left testicle embryonic carcinoma.  Patient underwent orchiectomy, removal of mass from left kidney and lymphadenectomy in 1984.  Currently taking hydrochlorothiazide 50 mg twice daily but reports the medication is causing erectile dysfunction and he would like to decrease the dose to daily.  Patient complains of occasional leg swelling.    Nasal obstruction  Patient complains of \"perpetual scab\" inside right nostril that causes blockage and difficulty breathing for the past 10 years.  Patient is requesting referral to ENT.    RLQ pain  Patient reports when he goes shopping with his wife and they go out to eat afterwards he has to run to the bathroom 30 minutes later.  Patient reports 4-5 episodes over the past 1 month with associated RLQ pain, sweating, and facial flushing.  Patient reports poor appetite but he has noticed having large meals prior to onset of symptoms.  Patient reports RLQ pain does not resolve until BM has passed.  Patient reports stools are liquid and sometimes soft paste like.  Patient also reports subsequent dizziness but admits to no water intake.  Patient denies blood in stool, nausea, vomiting.  History of cholecystectomy.    Screening for depression      8/21/2024     7:40 AM 8/4/2022     2:10 PM   PHQ-9 Screening   Little interest or pleasure in doing things 1 - several days 0 - not at all   Feeling down, depressed, or hopeless 0 - not at all 0 - not at all   Trouble falling or staying asleep, or sleeping too much 1 - several days    Feeling tired or having little energy 3 - nearly every day    Poor appetite or overeating 2 - more than half the days    Feeling bad about yourself - or that you are a failure or have let yourself or your family down 1 - several days    Trouble concentrating on " things, such as reading the newspaper or watching television 0 - not at all    Moving or speaking so slowly that other people could have noticed. Or the opposite - being so fidgety or restless that you have been moving around a lot more than usual 0 - not at all    Thoughts that you would be better off dead, or of hurting yourself in some way 0 - not at all    PHQ-2 Total Score 1 0   PHQ-9 Total Score 8      Interpretation of PHQ-9 Total Score   Score Severity   1-4 No Depression   5-9 Mild Depression   10-14 Moderate Depression   15-19 Moderately Severe Depression   20-27 Severe Depression      Health Maintenance  Cholesterol Screening: June 2024 , , HDL 34,   Diabetes Screening: June 2024   Aspirin Use: N/A  Diet / Execise: BMI 34.58  Smoking: denies  Substance Abuse: denies  Safe in relationship: yes  Dentist: follows up regularly  Ophthalmology: follows up regularly, wears glasses    Cancer screening  Colorectal Cancer Screening: cologuard negative Aug 2023  Lung Cancer Screening: history of lung cancer, follows up with oncology and CT every 3 months  Prostate Cancer Screening/PSA: negative June 2024    Infectious disease screening/Immunizations  --HIV Screening: negative July 2023  --Hepatitis C Screening: negative July 2023  --Immunizations:    Influenza: declined   Tetanus: declined   Shingles: declined   Pneumococcal: declined     Hepatitis B: declined      He  has a past medical history of Allergy, Anesthesia, Arthritis, Back pain, Breath shortness, Bronchitis (03/2021), Cancer (HCC) (1984), Cancer (HCC) (09/2022), Carcinoma in situ of respiratory system, Chickenpox, Cough, Heart burn, History of blood transfusion (1984), Influenza, Obesity, PONV (postoperative nausea and vomiting), Ringing in ears, Tonsillitis, Wears glasses, and Wheezing.    He has no past medical history of Acute nasopharyngitis, Anginal syndrome ([facility]), Arrhythmia, Asthma, Blood clotting disorder ([facility]), Bowel  habit changes, Cataract, Congestive heart failure (HCC), Continuous ambulatory peritoneal dialysis status (HCC), Coughing blood, Dental disorder, Diabetes (HCC), Dialysis patient (HCC), Disorder of thyroid, Emphysema of lung (HCC), Glaucoma, Gynecological disorder, Heart murmur, Heart valve disease, Hemorrhagic disorder (HCC), Hepatitis A, Hepatitis B, Hepatitis C, Hiatus hernia syndrome, High cholesterol, Hypertension, Indigestion, Jaundice, Myocardial infarct (HCC), Pacemaker, Pneumonia, Pregnant, Psychiatric problem, Renal disorder, Rheumatic fever, Seizure (HCC), Sleep apnea, Snoring, Stroke (HCC), Tuberculosis, Urinary bladder disorder, or Urinary incontinence.  He  has a past surgical history that includes tonsillectomy (); arthroscopy, knee (Right, ); other (Left, ); wedge resection lung (Right, ); henrietta by laparoscopy (); mass excision general (Left, ); pr bronchoscopy,diagnostic (N/A, 2022); endobronchial us add-on (2022); other orthopedic surgery (); other abdominal surgery (1984); thoracotomy (Right, 2023); node dissection (Right, 2023); hydrocelectomy adult (Right); and pr bronchoscopy,diagnostic (Right, 2023).  Family History   Problem Relation Age of Onset    Heart Disease Mother         MI age 78    Cancer Father             Hypertension Father     Stroke Father     Diabetes Father             Diabetes Maternal Aunt     Diabetes Maternal Uncle     Cancer Paternal Uncle         skin    Diabetes Maternal Grandmother     Stroke Maternal Grandmother             Heart Disease Paternal Grandmother         MI after giving birth    Heart Disease Paternal Grandfather         MI    Ovarian Cancer Neg Hx     Tubal Cancer Neg Hx     Peritoneal Cancer Neg Hx     Colorectal Cancer Neg Hx     Breast Cancer Neg Hx      Social History     Tobacco Use    Smoking status: Never     Passive exposure: Past    Smokeless tobacco: Never     Tobacco comments:     Both of my parents smoked cigarettes   Vaping Use    Vaping status: Never Used   Substance Use Topics    Alcohol use: Not Currently     Comment: Have been on the [place] for 30+ years    Drug use: Not Currently     Types: Oral     Comment: No drugs done in over 30 years       Patient Active Problem List    Diagnosis Date Noted    Mediastinal adenopathy 11/10/2023    Right inguinal hernia 07/12/2023    History of testicular cancer 07/12/2023    Acute pain of right shoulder 07/12/2023    History of lung cancer 09/15/2022    Pulmonary nodule 1 cm or greater in diameter 09/03/2022    Thrombosis 09/01/2022       Current Outpatient Medications   Medication Sig Dispense Refill    hydroCHLOROthiazide 50 MG Tab Take 1 Tablet by mouth every day. 90 Tablet 3    furosemide (LASIX) 20 MG Tab Take 1 Tablet by mouth 1 time a day as needed (leg swelling). 30 Tablet 2    buPROPion (WELLBUTRIN XL) 150 MG XL tablet Take 1 Tablet by mouth every morning. 30 Tablet 1    acetaminophen (TYLENOL) 500 MG Tab Take 500-1,000 mg by mouth every 6 hours as needed.      Zinc Citrate (ZINC EXTRA STRENGTH PO) Take 50 mg by mouth every day.      Multiple Vitamin (MULTIVITAMINS PO) Take 1 Tablet by mouth every day.      PSYLLIUM PO Take 5 Capsules by mouth 1 time a day as needed.       No current facility-administered medications for this visit.    (including changes today)  Allergies: Mefoxin    Review of Systems   Constitutional: Negative for fever, chills and malaise/fatigue.   HENT: Negative for congestion.    Eyes: Negative for pain.   Respiratory: Negative for cough and shortness of breath.    Cardiovascular: Negative for leg swelling.   Gastrointestinal: Negative for nausea, vomiting, and diarrhea.   Genitourinary: Negative for dysuria and hematuria.   Skin: Negative for rash.   Neurological: Negative for dizziness, focal weakness and headaches.   Endo/Heme/Allergies: Does not bleed easily.   Psychiatric/Behavioral: Positive  "for depression.  Negative for suicidal thoughts and anxiety.      Objective:     BP (!) 144/78   Pulse 67   Temp 36.2 °C (97.2 °F) (Temporal)   Ht 1.93 m (6' 4\")   Wt (!) 129 kg (284 lb 1.6 oz)   SpO2 98%   BMI 34.58 kg/m²   Body mass index is 34.58 kg/m².  Wt Readings from Last 4 Encounters:   08/21/24 (!) 129 kg (284 lb 1.6 oz)   11/30/23 (!) 130 kg (287 lb 4.2 oz)   11/17/23 (!) 129 kg (284 lb 8.1 oz)   11/10/23 (!) 130 kg (286 lb 14.9 oz)       Physical Exam:  Constitutional: Well-developed and well-nourished. No acute distress.   Skin: Skin is warm and dry. No rash noted.  Head: Atraumatic without lesions.  Eyes: Conjunctivae and extraocular motions are normal. Pupils are equal, round, and reactive to light. No scleral icterus.   Ears:  External ears unremarkable. Tympanic membranes clear and intact.  Mouth/Throat: Dentition is good. Tongue normal.  Neck: Supple, trachea midline. Normal range of motion. No thyromegaly. No lymphadenopathy.  Cardiovascular: Regular rate and rhythm, S1 and S2 without murmur, rubs, or gallops.    Lungs: Effort normal. Clear to auscultation bilaterally.  Abdomen: Soft, non tender, and without distention. Active bowel sounds.  Extremities: No cyanosis, clubbing, erythema, nor edema.  Musculoskeletal: Normal ROM in all extremities.  Neurological: Alert and oriented x 3.  No acute focal deficits.  Psychiatric:  Behavior, mood, and affect are appropriate.    Labs:  No visits with results within 1 Month(s) from this visit.   Latest known visit with results is:   Hospital Outpatient Visit on 06/19/2024   Component Date Value Ref Range Status    Prostatic Specific Antigen Tot 06/19/2024 0.18  0.00 - 4.00 ng/mL Final    Comment: Performed using Roche brenden e immunoassay analyzer. tPSA values determined  on patient samples by different testing procedures cannot be directly  compared with one another and could be the cause of erroneous medical  interpretations. If there is a change in " the tPSA assay procedure used while  monitoring therapy, then new baselines may need to be established when  comparing previous results.      Cholesterol,Tot 06/19/2024 169  100 - 199 mg/dL Final    Triglycerides 06/19/2024 167 (H)  0 - 149 mg/dL Final    HDL 06/19/2024 34 (A)  >=40 mg/dL Final    LDL 06/19/2024 102 (H)  <100 mg/dL Final    Sodium 06/19/2024 141  135 - 145 mmol/L Final    Potassium 06/19/2024 3.6  3.6 - 5.5 mmol/L Final    Chloride 06/19/2024 101  96 - 112 mmol/L Final    Co2 06/19/2024 31  20 - 33 mmol/L Final    Anion Gap 06/19/2024 9.0  7.0 - 16.0 Final    Glucose 06/19/2024 106 (H)  65 - 99 mg/dL Final    Bun 06/19/2024 16  8 - 22 mg/dL Final    Creatinine 06/19/2024 0.67  0.50 - 1.40 mg/dL Final    Calcium 06/19/2024 8.9  8.5 - 10.5 mg/dL Final    Correct Calcium 06/19/2024 8.9  8.5 - 10.5 mg/dL Final    AST(SGOT) 06/19/2024 24  12 - 45 U/L Final    ALT(SGPT) 06/19/2024 22  2 - 50 U/L Final    Alkaline Phosphatase 06/19/2024 68  30 - 99 U/L Final    Total Bilirubin 06/19/2024 0.5  0.1 - 1.5 mg/dL Final    Albumin 06/19/2024 4.0  3.2 - 4.9 g/dL Final    Total Protein 06/19/2024 7.0  6.0 - 8.2 g/dL Final    Globulin 06/19/2024 3.0  1.9 - 3.5 g/dL Final    A-G Ratio 06/19/2024 1.3  g/dL Final    WBC 06/19/2024 6.1  4.8 - 10.8 K/uL Final    RBC 06/19/2024 4.26 (L)  4.70 - 6.10 M/uL Final    Hemoglobin 06/19/2024 14.6  14.0 - 18.0 g/dL Final    Hematocrit 06/19/2024 41.6 (L)  42.0 - 52.0 % Final    MCV 06/19/2024 97.7  81.4 - 97.8 fL Final    MCH 06/19/2024 34.3 (H)  27.0 - 33.0 pg Final    MCHC 06/19/2024 35.1  32.3 - 36.5 g/dL Final    RDW 06/19/2024 47.6  35.9 - 50.0 fL Final    Platelet Count 06/19/2024 179  164 - 446 K/uL Final    MPV 06/19/2024 10.5  9.0 - 12.9 fL Final    Fasting Status 06/19/2024 Fasting   Final    GFR (CKD-EPI) 06/19/2024 109  >60 mL/min/1.73 m 2 Final    Comment: Estimated Glomerular Filtration Rate is calculated using  race neutral CKD-EPI 2021 equation per [facility]-AS[facility]  recommendations.           Assessment and Plan:     1. Wellness examination  Annual preventive exam done today.  All concerns were addressed today.  Labs reviewed with the patient.  UTD with colon, lung, prostate cancer screening.  Patient continues to decline all vaccines.    2. Nasal obstruction  Chronic, uncontrolled.  Patient requested referral to ENT.  - Referral to ENT    3. History of testicular cancer  4. Leg swelling  Chronic, uncontrolled.  Hydrochlorothiazide decreased to 50 mg daily due to twice daily dosing causing erectile dysfunction.  Prescribed Lasix 20 mg daily as needed for leg swelling.  - hydroCHLOROthiazide 50 MG Tab; Take 1 Tablet by mouth every day.  Dispense: 90 Tablet; Refill: 3  - furosemide (LASIX) 20 MG Tab; Take 1 Tablet by mouth 1 time a day as needed (leg swelling).  Dispense: 30 Tablet; Refill: 2    5. RLQ abdominal pain  This is a new problem.  Patient reported 4-5 episodes over the past 1 month where he has to run to the bathroom 30 minutes after eating.  Associated RLQ pain, sweating, facial flushing.  Patient admitted to eating large meals prior to onset of symptoms.  RLQ pain resolves with BM.  History of cholecystectomy.  Advised to keep a symptom log and given referral to GI for further management.  - Referral to Gastroenterology    6. Current mild episode of major depressive disorder without prior episode (HCC)  This is a new problem.  PHQ-9 score 8 today.  We will avoid SSRIs due to current issues with erectile dysfunction.  Prescribed Wellbutrin  mg daily.  Side effects of medication were discussed.  - buPROPion (WELLBUTRIN XL) 150 MG XL tablet; Take 1 Tablet by mouth every morning.  Dispense: 30 Tablet; Refill: 1    7. Elevated fasting glucose  Chronic, uncontrolled.  Fasting glucose 106.  July 2023 A1c 5.8.  Medication not indicated at this time.  Continue to monitor and advised healthy diet with regular exercise.        Anticipatory guidance  --Discussed moderation  in sodium/caffeine intake, saturated fat and cholesterol, caloric balance, sufficient fresh fruits/vegetables, fiber.  --Discussed brushing, flossing, and dental visits.   --Encouraged 150 minutes of exercise weekly.   --Discussed tobacco, alcohol, and other drug use.  --Discussed safety belts, safety helmets, smoke detector, gun safety etc.  --Discussed sun protection with minimum of spf 30.      Follow-up: Return in about 1 month (around 9/21/2024) for Discuss medication.

## 2024-08-21 NOTE — ASSESSMENT & PLAN NOTE
History of left testicle embryonic carcinoma.  Patient underwent orchiectomy, removal of mass from left kidney and lymphadenectomy in 1984.  Currently taking hydrochlorothiazide 50 mg twice daily but reports the medication is causing erectile dysfunction and he would like to decrease the dose to daily.  Patient complains of occasional leg swelling.

## 2024-08-22 PROBLEM — R73.01 ELEVATED FASTING GLUCOSE: Status: ACTIVE | Noted: 2024-08-22

## 2024-09-20 ENCOUNTER — HOSPITAL ENCOUNTER (OUTPATIENT)
Dept: RADIOLOGY | Facility: MEDICAL CENTER | Age: 59
End: 2024-09-20
Attending: RADIOLOGY
Payer: COMMERCIAL

## 2024-09-20 DIAGNOSIS — C34.91 ADENOCARCINOMA OF RIGHT LUNG (HCC): ICD-10-CM

## 2024-09-20 PROCEDURE — 71250 CT THORAX DX C-: CPT

## 2024-09-23 ENCOUNTER — HOSPITAL ENCOUNTER (OUTPATIENT)
Dept: RADIATION ONCOLOGY | Facility: MEDICAL CENTER | Age: 59
End: 2024-09-23
Attending: RADIOLOGY
Payer: COMMERCIAL

## 2024-09-23 DIAGNOSIS — R91.1 PULMONARY NODULE 1 CM OR GREATER IN DIAMETER: ICD-10-CM

## 2024-09-23 DIAGNOSIS — Z85.118 HISTORY OF LUNG CANCER: Chronic | ICD-10-CM

## 2024-09-23 PROCEDURE — 99213 OFFICE O/P EST LOW 20 MIN: CPT | Performed by: RADIOLOGY

## 2024-09-23 ASSESSMENT — ENCOUNTER SYMPTOMS
RESPIRATORY NEGATIVE: 1
PSYCHIATRIC NEGATIVE: 1
CARDIOVASCULAR NEGATIVE: 1

## 2024-09-23 NOTE — PROGRESS NOTES
Virtual Visit: Established Patient   This visit was conducted via Teams using secure and encrypted videoconferencing technology.   The patient was in their home in the [facility].    The patient's identity was confirmed and verbal consent was obtained for this virtual visit.     Subjective:   CC: Follow-up lung cancer      Babatunde Geovanny Sullivan is a 58 y.o. male presenting for evaluation and management of:     Mr. Sullivan is now a 58-year-old gentleman who comes to discuss treatment options for his recurrent lung cancer.  In brief, his recent history dates back to about late 2022 when he was diagnosed initially with lung adenocarcinoma.  He was clinically felt to have T2N1 disease, and was treated with neoadjuvant chemoimmunotherapy followed by lobectomy with Dr. Ganser.  Surgery was completed in February 2023.  Final pathology was with residual tumor, ypT3 ypN1.     Thereafter, he had been on surveillance, and a CT of the chest in September noticed a new ovoid nodule in the posterior right upper lobe measuring about 9 mm.  This was monitored and was noted to be enlarged now measuring 14 mm in November concerning for recurrence.  He saw Dr. Howe for follow-up, and was discussed with surgery as well.  However, because of prior right thoracotomy used for a distant diagnosis of metastatic testicular cancer in which she had partial right lung resection, he is no longer surgical candidate for further right lung resections.  He underwent a bronchoscopy and a right upper lobe nodule biopsy that confirmed recurrent adenocarcinoma with enteric features, consistent with a recurrence of his initial lung cancer.  PFTs are pending, but given that he is not a surgical candidate, he now comes to me to discuss radiation options.     He feels relatively well, does not have any major cardiopulmonary complaints, no distant bony pain, no obvious neurologic symptoms.     3/11/24 He is doing well.  He tolerated radiation well.  No major  acute problems.  He did have a cold last week which he is recovering from.  Otherwise, he is looking forward to ongoing follow-up and review of his imaging.     6/20/24 continues to do well.  No major acute issues currently.  No cardiopulmonary complaints currently.  No recent URIs.  We are reviewing his recent imaging today virtually.    9/23/2024 he is doing okay.  No acute complaints currently.  We are reviewing his recent imaging again today.    ROS   Review of Systems   Respiratory: Negative.     Cardiovascular: Negative.    Psychiatric/Behavioral: Negative.           Current medicines (including changes today)  Current Outpatient Medications   Medication Sig Dispense Refill    hydroCHLOROthiazide 50 MG Tab Take 1 Tablet by mouth every day. 90 Tablet 3    furosemide (LASIX) 20 MG Tab Take 1 Tablet by mouth 1 time a day as needed (leg swelling). 30 Tablet 2    buPROPion (WELLBUTRIN XL) 150 MG XL tablet Take 1 Tablet by mouth every morning. 30 Tablet 1    acetaminophen (TYLENOL) 500 MG Tab Take 500-1,000 mg by mouth every 6 hours as needed.      Zinc Citrate (ZINC EXTRA STRENGTH PO) Take 50 mg by mouth every day.      Multiple Vitamin (MULTIVITAMINS PO) Take 1 Tablet by mouth every day.      PSYLLIUM PO Take 5 Capsules by mouth 1 time a day as needed.       No current facility-administered medications for this encounter.       Patient Active Problem List    Diagnosis Date Noted    Elevated fasting glucose 08/22/2024    Mediastinal adenopathy 11/10/2023    Right inguinal hernia 07/12/2023    History of testicular cancer 07/12/2023    Acute pain of right shoulder 07/12/2023    History of lung cancer 09/15/2022    Pulmonary nodule 1 cm or greater in diameter 09/03/2022    Thrombosis 09/01/2022        Objective:   There were no vitals taken for this visit.    Physical Exam:  Constitutional: Alert, no distress, well-groomed.  Skin: No rashes in visible areas.  Eye: Round. Conjunctiva clear, lids normal. No icterus.    ENMT: Lips pink without lesions, good dentition, moist mucous membranes. Phonation normal.  Neck: No masses, no thyromegaly. Moves freely without pain.  Respiratory: Unlabored respiratory effort, no cough or audible wheeze  Psych: Alert and oriented x3, normal affect and mood.     Assessment and Plan:   The following treatment plan was discussed:     Recurrent lung adenocarcinoma: He is doing okay.  His scans are stable in the area of treatment.  The large area of fibrosis I suspect is all related to his radiation.  His mediastinal nodes are stable.  However the right lung nodule more inferiorly that was previously 5 mm is now more solid and appears to be about 8 to 9 mm.  This is concerning for progressive disease.  Furthermore, there is a least a possibility that there is any active carcinoma and the large area of fibrosis related to his radiation.  Will proceed with a PET scan to review this further and then make final decision about neck steps after that.    Follow-up: No follow-ups on file.

## 2024-10-24 ENCOUNTER — OFFICE VISIT (OUTPATIENT)
Dept: MEDICAL GROUP | Facility: PHYSICIAN GROUP | Age: 59
End: 2024-10-24
Payer: COMMERCIAL

## 2024-10-24 VITALS
OXYGEN SATURATION: 97 % | TEMPERATURE: 98 F | WEIGHT: 286.4 LBS | HEIGHT: 76 IN | BODY MASS INDEX: 34.88 KG/M2 | DIASTOLIC BLOOD PRESSURE: 80 MMHG | HEART RATE: 64 BPM | SYSTOLIC BLOOD PRESSURE: 120 MMHG

## 2024-10-24 DIAGNOSIS — F32.0 CURRENT MILD EPISODE OF MAJOR DEPRESSIVE DISORDER WITHOUT PRIOR EPISODE (HCC): ICD-10-CM

## 2024-10-24 DIAGNOSIS — N52.9 ERECTILE DYSFUNCTION, UNSPECIFIED ERECTILE DYSFUNCTION TYPE: ICD-10-CM

## 2024-10-24 DIAGNOSIS — C34.91 ADENOCARCINOMA OF RIGHT LUNG (HCC): ICD-10-CM

## 2024-10-24 DIAGNOSIS — Z85.47 HISTORY OF TESTICULAR CANCER: Chronic | ICD-10-CM

## 2024-10-24 DIAGNOSIS — M79.89 LEG SWELLING: ICD-10-CM

## 2024-10-24 PROCEDURE — 3079F DIAST BP 80-89 MM HG: CPT | Performed by: STUDENT IN AN ORGANIZED HEALTH CARE EDUCATION/TRAINING PROGRAM

## 2024-10-24 PROCEDURE — 99214 OFFICE O/P EST MOD 30 MIN: CPT | Performed by: STUDENT IN AN ORGANIZED HEALTH CARE EDUCATION/TRAINING PROGRAM

## 2024-10-24 PROCEDURE — 3074F SYST BP LT 130 MM HG: CPT | Performed by: STUDENT IN AN ORGANIZED HEALTH CARE EDUCATION/TRAINING PROGRAM

## 2024-10-24 RX ORDER — BUPROPION HYDROCHLORIDE 300 MG/1
300 TABLET ORAL EVERY MORNING
Qty: 30 TABLET | Refills: 1 | Status: SHIPPED | OUTPATIENT
Start: 2024-10-24

## 2024-10-24 ASSESSMENT — FIBROSIS 4 INDEX: FIB4 SCORE: 1.6

## 2024-10-26 PROBLEM — N52.9 ERECTILE DYSFUNCTION: Status: ACTIVE | Noted: 2024-10-26

## 2024-11-04 ENCOUNTER — HOSPITAL ENCOUNTER (OUTPATIENT)
Dept: RADIOLOGY | Facility: MEDICAL CENTER | Age: 59
End: 2024-11-04
Attending: RADIOLOGY
Payer: COMMERCIAL

## 2024-11-04 DIAGNOSIS — R91.1 PULMONARY NODULE 1 CM OR GREATER IN DIAMETER: ICD-10-CM

## 2024-11-04 DIAGNOSIS — Z85.118 HISTORY OF LUNG CANCER: Chronic | ICD-10-CM

## 2024-11-04 LAB — GLUCOSE BLD-MCNC: 125 MG/DL (ref 65–99)

## 2024-11-04 PROCEDURE — A9552 F18 FDG: HCPCS

## 2024-11-07 ENCOUNTER — HOSPITAL ENCOUNTER (OUTPATIENT)
Dept: RADIATION ONCOLOGY | Facility: MEDICAL CENTER | Age: 59
End: 2024-11-07
Attending: RADIOLOGY
Payer: COMMERCIAL

## 2024-11-07 PROCEDURE — 99442 PR PHYSICIAN TELEPHONE EVALUATION 11-20 MIN: CPT | Mod: 93 | Performed by: RADIOLOGY

## 2024-11-07 NOTE — PROGRESS NOTES
This visit is being conducted by telephone. This telephone visit was initiated by the patient and they verbally consented.  Patient at home in [facility].      Reason for Call: Posttreatment and imaging follow-up    Treatment History:     Radiation Oncology          12/22/2023 7/30/2024    09:01   Aria Course Treatment Dates   Course First Treatment Date 12/18/2023 12/18/2023    Course Last Treatment Date 12/22/2023 12/22/2023    Aria Treatment Summary   R Lung SBRT  Plan from Course C1 RUL Lung   Fraction 3 of 3    3 of 3    Elapsed Course Days 4 @ [id number]    4 @ [id number]    Prescribed Fraction Dose 1,800 cGy    1,800 cGy    Prescribed Total Dose 5,400 cGy    5,400 cGy    PTV  Reference Point from Course C1 RUL Lung   Elapsed Course Days 4 @ [id number]    4 @ [id number]    Session Dose 1,800 cGy    --    Total Dose 5,400 cGy    5,400 cGy         HPI:    Subjective    Babatunde Sullivan is a 58 y.o. male presenting for evaluation and management of:     Mr. Sullivan is now a 58-year-old gentleman who comes to discuss treatment options for his recurrent lung cancer.  In brief, his recent history dates back to about late 2022 when he was diagnosed initially with lung adenocarcinoma.  He was clinically felt to have T2N1 disease, and was treated with neoadjuvant chemoimmunotherapy followed by lobectomy with Dr. Ganser.  Surgery was completed in February 2023.  Final pathology was with residual tumor, ypT3 ypN1.     Thereafter, he had been on surveillance, and a CT of the chest in September noticed a new ovoid nodule in the posterior right upper lobe measuring about 9 mm.  This was monitored and was noted to be enlarged now measuring 14 mm in November concerning for recurrence.  He saw Dr. Howe for follow-up, and was discussed with surgery as well.  However, because of prior right thoracotomy used for a distant diagnosis of metastatic testicular cancer in which she had partial right lung resection, he is  no longer surgical candidate for further right lung resections.  He underwent a bronchoscopy and a right upper lobe nodule biopsy that confirmed recurrent adenocarcinoma with enteric features, consistent with a recurrence of his initial lung cancer.  PFTs are pending, but given that he is not a surgical candidate, he now comes to me to discuss radiation options.     He feels relatively well, does not have any major cardiopulmonary complaints, no distant bony pain, no obvious neurologic symptoms.    3/11/24 He is doing well.  He tolerated radiation well.  No major acute problems.  He did have a cold last week which he is recovering from.  Otherwise, he is looking forward to ongoing follow-up and review of his imaging.     6/20/24 continues to do well.  No major acute issues currently.  No cardiopulmonary complaints currently.  No recent URIs.  We are reviewing his recent imaging today virtually.     9/23/2024 he is doing okay.  No acute complaints currently.  We are reviewing his recent imaging again today.        Interval History:   11/7/2024 as a result of the evolving right upper lobe nodule on our last follow-up, we had opted to obtain a PET scan at this interval.  We are reviewing the scan results today.  He is feeling reasonably well otherwise.  No major complaints.  Unfortunately the PET does show that the 8 mm right upper lobe nodule is PET avid concerning for metastasis.  More superiorly just above the area of the SBRT fibrosis is another small area that is PET avid as well.    Labs / Images Reviewed:   CT-CHEST (THORAX) W/O    Result Date: 9/23/2024  1. New 3.7 cm lobe mass, concerning for malignancy. 2. Progressed, now 8 mm suspicious right upper lobe pulmonary nodule. Also concerning for malignancy. 3. Stable right paratracheal adenopathy measuring 14 x 12 mm. No new adenopathy. 4. Several small right basilar pleural effusion and post surgical change. 5. 3 cm abdominal wall hernia noted. Patricia  Society pulmonary nodule recommendations: Not Applicable     [id number] BASE TO MID-THIGH    Result Date: 11/4/2024  1.  3.7 cm mass/consolidation right upper pulmonary lobe is identified. There are foci of elevated activity within the mass. Findings are suspicious for recurrent lung carcinoma. 2.  Additional 0.8 cm nodule more inferiorly in the right upper pulmonary lobe does demonstrate some elevated activity which is also suspicious for carcinoma. 3.  Right paratracheal lymph node in the upper mediastinum is unchanged in size with no elevated activity. 4.  No PET/CT evidence of metastatic disease in the neck abdomen or pelvis.      Assessment:   Adenocarcinoma, lung (HCC)  Staging form: Lung, AJCC 8th Edition  - Clinical stage from 9/15/2022: Stage IIB (cT2a, cN1, cM0) - Signed by Clementine Howe M.D. on 9/15/2022      Cancer Status:   Suspected right upper lobe recurrence    Plan:   I relayed to Babatunde the results of his PET scan that unfortunately indicate another nodule in the right upper lobe, as well as an area of PET uptake just superior to the area of the SBRT fibrosis.  Both of these are concerning for further recurrent disease.  In theory the small nodule could be treated with SBRT again, but the area just on the border of the prior treatment is somewhat complex and while we could treated, does have some greater risk of acute toxicities.  Therefore I will reach out to thoracic surgery to see if any further surgical local treatment is possible and also reach out to medical oncology to see what options he may have from a systemic therapy standpoint given that effectively this is a second recurrence now.  Phone call follow-up with me in about 1 week to review the final plan.    Time Spent on Call: 12 min      Time Spent in Preparation: 10 min    Total Time Spent: 22 min    Robert Deleon M.D.

## 2024-11-11 ASSESSMENT — LIFESTYLE VARIABLES
TOBACCO_USE: NO
SMOKING_STATUS: NO

## 2024-11-14 ENCOUNTER — HOSPITAL ENCOUNTER (OUTPATIENT)
Dept: RADIATION ONCOLOGY | Facility: MEDICAL CENTER | Age: 59
End: 2024-11-14
Attending: RADIOLOGY
Payer: COMMERCIAL

## 2024-11-14 DIAGNOSIS — C34.91 ADENOCARCINOMA OF RIGHT LUNG (HCC): ICD-10-CM

## 2024-11-14 DIAGNOSIS — R91.1 PULMONARY NODULE 1 CM OR GREATER IN DIAMETER: ICD-10-CM

## 2024-11-14 PROCEDURE — 99441 PR PHYSICIAN TELEPHONE EVALUATION 5-10 MIN: CPT | Mod: 93 | Performed by: RADIOLOGY

## 2024-11-14 NOTE — PROGRESS NOTES
This visit is being conducted by telephone. This telephone visit was initiated by the patient and they verbally consented.  Patient at home in [facility].      Reason for Call:  Post tx FU    Treatment History:     Radiation Oncology          12/22/2023 7/30/2024    09:01   Aria Course Treatment Dates   Course First Treatment Date 12/18/2023 12/18/2023    Course Last Treatment Date 12/22/2023 12/22/2023    Aria Treatment Summary   R Lung SBRT  Plan from Course C1 RUL Lung   Fraction 3 of 3    3 of 3    Elapsed Course Days 4 @ [id number]    4 @ [id number]    Prescribed Fraction Dose 1,800 cGy    1,800 cGy    Prescribed Total Dose 5,400 cGy    5,400 cGy    PTV  Reference Point from Course C1 RUL Lung   Elapsed Course Days 4 @ [id number]    4 @ [id number]    Session Dose 1,800 cGy    --    Total Dose 5,400 cGy    5,400 cGy         HPI:    Subjective    Babatunde Sullivan is a 58 y.o. male presenting for evaluation and management of:     Mr. Sullivan is now a 58-year-old gentleman who comes to discuss treatment options for his recurrent lung cancer.  In brief, his recent history dates back to about late 2022 when he was diagnosed initially with lung adenocarcinoma.  He was clinically felt to have T2N1 disease, and was treated with neoadjuvant chemoimmunotherapy followed by lobectomy with Dr. Ganser.  Surgery was completed in February 2023.  Final pathology was with residual tumor, ypT3 ypN1.     Thereafter, he had been on surveillance, and a CT of the chest in September noticed a new ovoid nodule in the posterior right upper lobe measuring about 9 mm.  This was monitored and was noted to be enlarged now measuring 14 mm in November concerning for recurrence.  He saw Dr. Howe for follow-up, and was discussed with surgery as well.  However, because of prior right thoracotomy used for a distant diagnosis of metastatic testicular cancer in which she had partial right lung resection, he is no longer surgical  candidate for further right lung resections.  He underwent a bronchoscopy and a right upper lobe nodule biopsy that confirmed recurrent adenocarcinoma with enteric features, consistent with a recurrence of his initial lung cancer.  PFTs are pending, but given that he is not a surgical candidate, he now comes to me to discuss radiation options.     He feels relatively well, does not have any major cardiopulmonary complaints, no distant bony pain, no obvious neurologic symptoms.     3/11/24 He is doing well.  He tolerated radiation well.  No major acute problems.  He did have a cold last week which he is recovering from.  Otherwise, he is looking forward to ongoing follow-up and review of his imaging.     6/20/24 continues to do well.  No major acute issues currently.  No cardiopulmonary complaints currently.  No recent URIs.  We are reviewing his recent imaging today virtually.     9/23/2024 he is doing okay.  No acute complaints currently.  We are reviewing his recent imaging again today.    11/7/2024 as a result of the evolving right upper lobe nodule on our last follow-up, we had opted to obtain a PET scan at this interval.  We are reviewing the scan results today.  He is feeling reasonably well otherwise.  No major complaints.  Unfortunately the PET does show that the 8 mm right upper lobe nodule is PET avid concerning for metastasis.  More superiorly just above the area of the SBRT fibrosis is another small area that is PET avid as well.         Interval History:   11/14/24 I am having a telephone follow-up to review the final plan.  I discussed his case with thoracic surgery medical oncology.  At least based on his prior PFTs and the fact that he is never smoked, he would be amenable to a potential total completion right pneumonectomy.  We are discussing this further today on the phone.    Labs / Images Reviewed:   CT-CHEST (THORAX) W/O    Result Date: 9/23/2024  1. New 3.7 cm lobe mass, concerning for  malignancy. 2. Progressed, now 8 mm suspicious right upper lobe pulmonary nodule. Also concerning for malignancy. 3. Stable right paratracheal adenopathy measuring 14 x 12 mm. No new adenopathy. 4. Several small right basilar pleural effusion and post surgical change. 5. 3 cm abdominal wall hernia noted. Fleischner Society pulmonary nodule recommendations: Not Applicable     [id number] BASE TO MID-THIGH    Result Date: 11/4/2024  1.  3.7 cm mass/consolidation right upper pulmonary lobe is identified. There are foci of elevated activity within the mass. Findings are suspicious for recurrent lung carcinoma. 2.  Additional 0.8 cm nodule more inferiorly in the right upper pulmonary lobe does demonstrate some elevated activity which is also suspicious for carcinoma. 3.  Right paratracheal lymph node in the upper mediastinum is unchanged in size with no elevated activity. 4.  No PET/CT evidence of metastatic disease in the neck abdomen or pelvis.      Assessment:   Adenocarcinoma, lung (HCC)  Staging form: Lung, AJCC 8th Edition  - Clinical stage from 9/15/2022: Stage IIB (cT2a, cN1, cM0) - Signed by Clementine Howe M.D. on 9/15/2022      Cancer Status:   Disease Progression Regional    Plan:   I reviewed with Babatunde the plan for possible completion right pneumonectomy.  We discussed in general terms what this would entail.  He will need updated PFTs.  I will plan to present his case at the thoracic tumor board.  Assuming thoracic surgery agrees, I think this represents his best chance for salvage treatment.  He is amenable to this as well.  Therefore plan to refer for PFTs, refer to thoracic surgery, and tumor board.    Time Spent on Call: 5 minutes      Time Spent in Preparation: 7 minutes    Total Time Spent: 12 minutes    Robert Deleon M.D.

## 2024-11-25 ENCOUNTER — OFFICE VISIT (OUTPATIENT)
Dept: MEDICAL GROUP | Facility: PHYSICIAN GROUP | Age: 59
End: 2024-11-25
Payer: COMMERCIAL

## 2024-11-25 ENCOUNTER — HOSPITAL ENCOUNTER (OUTPATIENT)
Dept: LAB | Facility: MEDICAL CENTER | Age: 59
End: 2024-11-25
Attending: STUDENT IN AN ORGANIZED HEALTH CARE EDUCATION/TRAINING PROGRAM
Payer: COMMERCIAL

## 2024-11-25 VITALS
OXYGEN SATURATION: 97 % | BODY MASS INDEX: 34.47 KG/M2 | SYSTOLIC BLOOD PRESSURE: 110 MMHG | DIASTOLIC BLOOD PRESSURE: 66 MMHG | WEIGHT: 283.1 LBS | TEMPERATURE: 98.3 F | HEIGHT: 76 IN | HEART RATE: 75 BPM

## 2024-11-25 DIAGNOSIS — F32.0 CURRENT MILD EPISODE OF MAJOR DEPRESSIVE DISORDER WITHOUT PRIOR EPISODE (HCC): ICD-10-CM

## 2024-11-25 DIAGNOSIS — N52.9 ERECTILE DYSFUNCTION, UNSPECIFIED ERECTILE DYSFUNCTION TYPE: ICD-10-CM

## 2024-11-25 DIAGNOSIS — C34.91 ADENOCARCINOMA OF RIGHT LUNG (HCC): ICD-10-CM

## 2024-11-25 PROCEDURE — 84270 ASSAY OF SEX HORMONE GLOBUL: CPT

## 2024-11-25 PROCEDURE — 84403 ASSAY OF TOTAL TESTOSTERONE: CPT

## 2024-11-25 PROCEDURE — 36415 COLL VENOUS BLD VENIPUNCTURE: CPT

## 2024-11-25 PROCEDURE — 84402 ASSAY OF FREE TESTOSTERONE: CPT

## 2024-11-25 RX ORDER — BUPROPION HYDROCHLORIDE 300 MG/1
300 TABLET ORAL EVERY MORNING
Qty: 90 TABLET | Refills: 3 | Status: SHIPPED | OUTPATIENT
Start: 2024-11-25

## 2024-11-25 ASSESSMENT — FIBROSIS 4 INDEX: FIB4 SCORE: 1.63

## 2024-11-25 NOTE — PROGRESS NOTES
Subjective:     Chief Complaint   Patient presents with    Follow-Up     Medication followup (bupropion)       History of Present Illness  The patient presents for evaluation of multiple medical concerns. He is accompanied by his wife.    He reports that his depression is well-managed with Wellbutrin  mg daily, and he is not experiencing any breakthrough anxiety or depression.    He continues to experience erectile dysfunction. He no longer experiences morning erections but does have erections at night. He struggles with both achieving and maintaining an erection.    He has not needed to use Lasix and reports no leg swelling. He has chosen not to receive the pneumonia vaccine.    He has an upcoming appointment with GI and has noticed a correlation between consuming potatoes and experiencing explosive diarrhea.    He also has an upcoming appointment with ENT.    He was initially diagnosed with lung cancer Sept 2022 s/p chemo and surgery (right middle and lower lobectomy). Nov 2024 PET showed 3.7 cm mass/consolidation right upper pulmonary lobe with foci of elevated activity within the mass suspicious for recurrent lung carcinoma and additional 0.8 cm nodule more inferiorly in the right upper pulmonary lobe with some elevated activity which is also suspicious for carcinoma. He has an appointment with the surgeon on 12/05 and is going to have a pulmonary function test tomorrow.        No problem-specific Assessment & Plan notes found for this encounter.    1. Current mild episode of major depressive disorder without prior episode (HCC)  Chronic, uncontrolled.  Wellbutrin XL increased to 300 mg daily.  - buPROPion (WELLBUTRIN XL) 300 MG XL tablet; Take 1 Tablet by mouth every morning.  Dispense: 30 Tablet; Refill: 1     2. Adenocarcinoma of right lung (HCC)  Chronic, uncontrolled.  Initially diagnosed September 2022 s/p chemo and surgery (right middle and lower lobectomy).  Follows up with oncology (Dr. Howe)  "and CT every 3 months.  Recent CT above and concern for recurrence.  Upcoming PET on 11/4.  Patient declined all vaccines.        Health Maintenance: Completed    ROS:  Negative except as stated above.      Objective:     Exam:  /66 (BP Location: Right arm, Patient Position: Sitting, BP Cuff Size: Adult)   Pulse 75   Temp 36.8 °C (98.3 °F) (Temporal)   Ht 1.93 m (6' 4\")   Wt (!) 128 kg (283 lb 1.6 oz)   SpO2 97%   BMI 34.46 kg/m²  Body mass index is 34.46 kg/m².    Physical Exam    Gen: Alert and oriented, no acute distress.  Lungs: Normal effort, CTAB, no wheezing / rhonchi / rales, no breath sounds at right base.  CV: RRR, normal S1 and S2, no murmurs.      Assessment & Plan:     59 y.o. male with the following -     1. Current mild episode of major depressive disorder without prior episode (HCC)  Chronic, stable.  Continue wellbutrin  mg daily.  - buPROPion (WELLBUTRIN XL) 300 MG XL tablet; Take 1 Tablet by mouth every morning.  Dispense: 90 Tablet; Refill: 3    2. Adenocarcinoma of right lung (HCC)  Chronic, uncontrolled.  Diagnosed with lung cancer Sept 2022 s/p chemo and surgery (right middle and lower lobectomy).  Recent PET showed 3.7 cm mass and 0.8 cm nodule in RUL suspicious for carcinoma. Appointment with the surgeon on 12/5 and PFTs tomorrow.    3. Erectile dysfunction, unspecified erectile dysfunction type  Chronic, uncontrolled.  History of left testicle embryonic carcinoma s/p orchiectomy in 1984.  Depression stable on wellbutrin.  BP well controlled.  Non-smoker.  June 2024 PSA WNL.  Testosterone level ordered and consider referral to Urology.  - Testosterone, Free & Total, Adult Male (w/SHBG); Future            Return in about 6 months (around 5/25/2025) for Follow-up of chronic conditions.    Verbal consent was acquired by the patient to use [employer] ambient listening note generation during this visit: Yes.    Please note that this dictation was created using voice recognition " software. I have made every reasonable attempt to correct obvious errors, but I expect that there are errors of grammar and possibly content that I did not discover before finalizing the note.

## 2024-11-26 ENCOUNTER — NON-PROVIDER VISIT (OUTPATIENT)
Dept: SLEEP MEDICINE | Facility: MEDICAL CENTER | Age: 59
End: 2024-11-26
Attending: RADIOLOGY
Payer: COMMERCIAL

## 2024-11-26 VITALS — HEIGHT: 76 IN | WEIGHT: 280.6 LBS | BODY MASS INDEX: 34.17 KG/M2

## 2024-11-26 DIAGNOSIS — R91.1 PULMONARY NODULE 1 CM OR GREATER IN DIAMETER: ICD-10-CM

## 2024-11-26 DIAGNOSIS — C34.91 ADENOCARCINOMA OF RIGHT LUNG (HCC): ICD-10-CM

## 2024-11-26 PROCEDURE — 94726 PLETHYSMOGRAPHY LUNG VOLUMES: CPT | Mod: 26 | Performed by: STUDENT IN AN ORGANIZED HEALTH CARE EDUCATION/TRAINING PROGRAM

## 2024-11-26 PROCEDURE — 94726 PLETHYSMOGRAPHY LUNG VOLUMES: CPT | Performed by: STUDENT IN AN ORGANIZED HEALTH CARE EDUCATION/TRAINING PROGRAM

## 2024-11-26 PROCEDURE — 94060 EVALUATION OF WHEEZING: CPT | Performed by: STUDENT IN AN ORGANIZED HEALTH CARE EDUCATION/TRAINING PROGRAM

## 2024-11-26 PROCEDURE — 94729 DIFFUSING CAPACITY: CPT | Mod: 26 | Performed by: STUDENT IN AN ORGANIZED HEALTH CARE EDUCATION/TRAINING PROGRAM

## 2024-11-26 PROCEDURE — 94729 DIFFUSING CAPACITY: CPT | Performed by: STUDENT IN AN ORGANIZED HEALTH CARE EDUCATION/TRAINING PROGRAM

## 2024-11-26 PROCEDURE — 94060 EVALUATION OF WHEEZING: CPT | Mod: 26 | Performed by: STUDENT IN AN ORGANIZED HEALTH CARE EDUCATION/TRAINING PROGRAM

## 2024-11-26 ASSESSMENT — FIBROSIS 4 INDEX: FIB4 SCORE: 1.63

## 2024-11-26 NOTE — PROCEDURES
Tech: Anastasiia Garcia, DAVID  Good patient effort & cooperation.  Test was performed on the [employer] Body Plethysmograph- Elite DX system.  The predicted sets used for Spirometry are GLI-2012, for Lung Volumes are ITS, and for DLCO is GLI 2017.  The results of this test meet the ATS standards for acceptability and repeatability.  The DLCO was uncorrected for Hb.  A bronchodilator of Ventolin HFA 2 puffs via spacer was administered.  DLCO was performed during dilation period.    Interpretation:  FVC 3.31 L, 57%  FEV1 2.49 L, 56%  FEV1/FVC 75%  TLC 5.23 L, 63%  DLCO 24.17, 72%  DL/VA 5.36, 132%    Spirometry demonstrates proportional reductions in FVC and FEV1.  No obstruction is observed.  No bronchodilator response is observed.  Lung volumes demonstrate moderate pulmonary restriction.  Gas exchange is mildly impaired and corrects to normal when accounting for loss of alveolar volume.

## 2024-11-27 LAB
SHBG SERPL-SCNC: 44 NMOL/L (ref 19–76)
TESTOST FREE MFR SERPL: 1.5 % (ref 1.6–2.9)
TESTOST FREE SERPL-MCNC: 38 PG/ML (ref 47–244)
TESTOST SERPL-MCNC: 251 NG/DL (ref 300–890)

## 2024-12-02 DIAGNOSIS — E29.1 PRIMARY HYPOGONADISM IN MALE: ICD-10-CM

## 2024-12-02 DIAGNOSIS — N52.9 ERECTILE DYSFUNCTION, UNSPECIFIED ERECTILE DYSFUNCTION TYPE: ICD-10-CM

## 2024-12-02 DIAGNOSIS — Z85.47 HISTORY OF TESTICULAR CANCER: Chronic | ICD-10-CM

## 2024-12-17 ENCOUNTER — APPOINTMENT (OUTPATIENT)
Dept: ADMISSIONS | Facility: MEDICAL CENTER | Age: 59
DRG: 165 | End: 2024-12-17
Attending: SURGERY
Payer: COMMERCIAL

## 2024-12-19 ENCOUNTER — PRE-ADMISSION TESTING (OUTPATIENT)
Dept: ADMISSIONS | Facility: MEDICAL CENTER | Age: 59
DRG: 165 | End: 2024-12-19
Attending: SURGERY
Payer: COMMERCIAL

## 2024-12-19 RX ORDER — GUAIFENESIN/PHENYLPROPANOLAMIN
540 EXPECTORANT ORAL DAILY
COMMUNITY

## 2024-12-19 NOTE — PREADMIT AVS NOTE
Case: [id number] Date/Time: 12/20/24 1400    Procedures:       RIGHT THORACOTOMY, COMPLETION PNEUMONECTOMY, NODE DISSECTION      LYMPHADENECTOMY    Pre-op diagnosis: MALIGNANT TUMOR OF LUNG-RIGHT    Location: [facility] OR 05 / SURGERY [facility]    Surgeons: John H Ganser, M.D.          Current Medications   Medication Instructions    Saw Palmetto 500 MG Cap Stop 7 days before surgery    buPROPion (WELLBUTRIN XL) 300 MG XL tablet Continue taking medication as prescribed, including morning of procedure     hydroCHLOROthiazide 50 MG Tab Hold medication day of procedure    furosemide (LASIX) 20 MG Tab Hold medication day of procedure    acetaminophen (TYLENOL) 500 MG Tab As needed medication, may take if needed, including morning of procedure     Zinc Citrate (ZINC EXTRA STRENGTH PO) Stop 7 days before surgery    Multiple Vitamin (MULTIVITAMINS PO) Stop 7 days before surgery    PSYLLIUM PO Hold medication day of procedure

## 2024-12-20 ENCOUNTER — ANESTHESIA EVENT (OUTPATIENT)
Dept: SURGERY | Facility: MEDICAL CENTER | Age: 59
DRG: 165 | End: 2024-12-20
Payer: COMMERCIAL

## 2024-12-20 ENCOUNTER — ANESTHESIA (OUTPATIENT)
Dept: SURGERY | Facility: MEDICAL CENTER | Age: 59
DRG: 165 | End: 2024-12-20
Payer: COMMERCIAL

## 2024-12-20 ENCOUNTER — HOSPITAL ENCOUNTER (INPATIENT)
Facility: MEDICAL CENTER | Age: 59
LOS: 4 days | DRG: 165 | End: 2024-12-24
Attending: SURGERY | Admitting: SURGERY
Payer: COMMERCIAL

## 2024-12-20 ENCOUNTER — APPOINTMENT (OUTPATIENT)
Dept: RADIOLOGY | Facility: MEDICAL CENTER | Age: 59
DRG: 165 | End: 2024-12-20
Attending: PHYSICIAN ASSISTANT
Payer: COMMERCIAL

## 2024-12-20 DIAGNOSIS — C34.91 ADENOCARCINOMA OF RIGHT LUNG (HCC): ICD-10-CM

## 2024-12-20 DIAGNOSIS — G89.18 POSTOPERATIVE PAIN: ICD-10-CM

## 2024-12-20 LAB
ABO GROUP BLD: NORMAL
ALBUMIN SERPL BCP-MCNC: 4.1 G/DL (ref 3.2–4.9)
ALBUMIN/GLOB SERPL: 1.4 G/DL
ALP SERPL-CCNC: 70 U/L (ref 30–99)
ALT SERPL-CCNC: 19 U/L (ref 2–50)
ANION GAP SERPL CALC-SCNC: 11 MMOL/L (ref 7–16)
AST SERPL-CCNC: 18 U/L (ref 12–45)
BILIRUB SERPL-MCNC: 0.5 MG/DL (ref 0.1–1.5)
BLD GP AB SCN SERPL QL: NORMAL
BUN SERPL-MCNC: 16 MG/DL (ref 8–22)
CALCIUM ALBUM COR SERPL-MCNC: 8.6 MG/DL (ref 8.5–10.5)
CALCIUM SERPL-MCNC: 8.7 MG/DL (ref 8.5–10.5)
CHLORIDE SERPL-SCNC: 103 MMOL/L (ref 96–112)
CO2 SERPL-SCNC: 26 MMOL/L (ref 20–33)
CREAT SERPL-MCNC: 0.73 MG/DL (ref 0.5–1.4)
ERYTHROCYTE [DISTWIDTH] IN BLOOD BY AUTOMATED COUNT: 46.2 FL (ref 35.9–50)
GFR SERPLBLD CREATININE-BSD FMLA CKD-EPI: 106 ML/MIN/1.73 M 2
GLOBULIN SER CALC-MCNC: 3 G/DL (ref 1.9–3.5)
GLUCOSE SERPL-MCNC: 107 MG/DL (ref 65–99)
HCT VFR BLD AUTO: 41.4 % (ref 42–52)
HGB BLD-MCNC: 14.5 G/DL (ref 14–18)
MCH RBC QN AUTO: 34 PG (ref 27–33)
MCHC RBC AUTO-ENTMCNC: 35 G/DL (ref 32.3–36.5)
MCV RBC AUTO: 97.2 FL (ref 81.4–97.8)
PLATELET # BLD AUTO: 173 K/UL (ref 164–446)
PMV BLD AUTO: 9.8 FL (ref 9–12.9)
POTASSIUM SERPL-SCNC: 3.5 MMOL/L (ref 3.6–5.5)
PROT SERPL-MCNC: 7.1 G/DL (ref 6–8.2)
RBC # BLD AUTO: 4.26 M/UL (ref 4.7–6.1)
RH BLD: NORMAL
SODIUM SERPL-SCNC: 140 MMOL/L (ref 135–145)
WBC # BLD AUTO: 5.8 K/UL (ref 4.8–10.8)

## 2024-12-20 PROCEDURE — 07B70ZX EXCISION OF THORAX LYMPHATIC, OPEN APPROACH, DIAGNOSTIC: ICD-10-PCS | Performed by: SURGERY

## 2024-12-20 PROCEDURE — 700101 HCHG RX REV CODE 250: Performed by: ANESTHESIOLOGY

## 2024-12-20 PROCEDURE — 700105 HCHG RX REV CODE 258: Performed by: PHYSICIAN ASSISTANT

## 2024-12-20 PROCEDURE — 80053 COMPREHEN METABOLIC PANEL: CPT

## 2024-12-20 PROCEDURE — 160029 HCHG SURGERY MINUTES - 1ST 30 MINS LEVEL 4: Performed by: SURGERY

## 2024-12-20 PROCEDURE — 160009 HCHG ANES TIME/MIN: Performed by: SURGERY

## 2024-12-20 PROCEDURE — 3E0T3BZ INTRODUCTION OF ANESTHETIC AGENT INTO PERIPHERAL NERVES AND PLEXI, PERCUTANEOUS APPROACH: ICD-10-PCS | Performed by: ANESTHESIOLOGY

## 2024-12-20 PROCEDURE — 700111 HCHG RX REV CODE 636 W/ 250 OVERRIDE (IP): Performed by: ANESTHESIOLOGY

## 2024-12-20 PROCEDURE — 700111 HCHG RX REV CODE 636 W/ 250 OVERRIDE (IP): Mod: JZ | Performed by: ANESTHESIOLOGY

## 2024-12-20 PROCEDURE — 770001 HCHG ROOM/CARE - MED/SURG/GYN PRIV*

## 2024-12-20 PROCEDURE — 700105 HCHG RX REV CODE 258: Performed by: SURGERY

## 2024-12-20 PROCEDURE — 700105 HCHG RX REV CODE 258: Performed by: ANESTHESIOLOGY

## 2024-12-20 PROCEDURE — 71045 X-RAY EXAM CHEST 1 VIEW: CPT

## 2024-12-20 PROCEDURE — 160002 HCHG RECOVERY MINUTES (STAT): Performed by: SURGERY

## 2024-12-20 PROCEDURE — 85027 COMPLETE CBC AUTOMATED: CPT

## 2024-12-20 PROCEDURE — 0BTK0ZZ RESECTION OF RIGHT LUNG, OPEN APPROACH: ICD-10-PCS | Performed by: SURGERY

## 2024-12-20 PROCEDURE — 36415 COLL VENOUS BLD VENIPUNCTURE: CPT

## 2024-12-20 PROCEDURE — 86900 BLOOD TYPING SEROLOGIC ABO: CPT

## 2024-12-20 PROCEDURE — A9270 NON-COVERED ITEM OR SERVICE: HCPCS | Performed by: ANESTHESIOLOGY

## 2024-12-20 PROCEDURE — 700102 HCHG RX REV CODE 250 W/ 637 OVERRIDE(OP): Performed by: PHYSICIAN ASSISTANT

## 2024-12-20 PROCEDURE — 88305 TISSUE EXAM BY PATHOLOGIST: CPT

## 2024-12-20 PROCEDURE — 93005 ELECTROCARDIOGRAM TRACING: CPT | Mod: TC | Performed by: SURGERY

## 2024-12-20 PROCEDURE — 160041 HCHG SURGERY MINUTES - EA ADDL 1 MIN LEVEL 4: Performed by: SURGERY

## 2024-12-20 PROCEDURE — 700111 HCHG RX REV CODE 636 W/ 250 OVERRIDE (IP): Performed by: PHYSICIAN ASSISTANT

## 2024-12-20 PROCEDURE — A9270 NON-COVERED ITEM OR SERVICE: HCPCS | Performed by: PHYSICIAN ASSISTANT

## 2024-12-20 PROCEDURE — 700102 HCHG RX REV CODE 250 W/ 637 OVERRIDE(OP): Performed by: ANESTHESIOLOGY

## 2024-12-20 PROCEDURE — 86850 RBC ANTIBODY SCREEN: CPT

## 2024-12-20 PROCEDURE — 160035 HCHG PACU - 1ST 60 MINS PHASE I: Performed by: SURGERY

## 2024-12-20 PROCEDURE — 88309 TISSUE EXAM BY PATHOLOGIST: CPT

## 2024-12-20 PROCEDURE — 160048 HCHG OR STATISTICAL LEVEL 1-5: Performed by: SURGERY

## 2024-12-20 PROCEDURE — 64450 NJX AA&/STRD OTHER PN/BRANCH: CPT | Performed by: SURGERY

## 2024-12-20 PROCEDURE — 86901 BLOOD TYPING SEROLOGIC RH(D): CPT

## 2024-12-20 RX ORDER — ONDANSETRON 2 MG/ML
4 INJECTION INTRAMUSCULAR; INTRAVENOUS EVERY 4 HOURS PRN
Status: DISCONTINUED | OUTPATIENT
Start: 2024-12-20 | End: 2024-12-24 | Stop reason: HOSPADM

## 2024-12-20 RX ORDER — METHOCARBAMOL 100 MG/ML
1000 INJECTION, SOLUTION INTRAMUSCULAR; INTRAVENOUS EVERY 8 HOURS PRN
Status: DISPENSED | OUTPATIENT
Start: 2024-12-20 | End: 2024-12-23

## 2024-12-20 RX ORDER — HYDROMORPHONE HYDROCHLORIDE 1 MG/ML
0.1 INJECTION, SOLUTION INTRAMUSCULAR; INTRAVENOUS; SUBCUTANEOUS
Status: DISCONTINUED | OUTPATIENT
Start: 2024-12-20 | End: 2024-12-20 | Stop reason: HOSPADM

## 2024-12-20 RX ORDER — HYDRALAZINE HYDROCHLORIDE 20 MG/ML
10 INJECTION INTRAMUSCULAR; INTRAVENOUS EVERY 6 HOURS PRN
Status: DISCONTINUED | OUTPATIENT
Start: 2024-12-20 | End: 2024-12-24 | Stop reason: HOSPADM

## 2024-12-20 RX ORDER — DIPHENHYDRAMINE HCL 25 MG
25 TABLET ORAL EVERY 6 HOURS PRN
Status: DISCONTINUED | OUTPATIENT
Start: 2024-12-20 | End: 2024-12-24 | Stop reason: HOSPADM

## 2024-12-20 RX ORDER — SODIUM CHLORIDE, SODIUM LACTATE, POTASSIUM CHLORIDE, CALCIUM CHLORIDE 600; 310; 30; 20 MG/100ML; MG/100ML; MG/100ML; MG/100ML
INJECTION, SOLUTION INTRAVENOUS CONTINUOUS
Status: DISCONTINUED | OUTPATIENT
Start: 2024-12-20 | End: 2024-12-23

## 2024-12-20 RX ORDER — ONDANSETRON 4 MG/1
4 TABLET, ORALLY DISINTEGRATING ORAL EVERY 4 HOURS PRN
Status: DISCONTINUED | OUTPATIENT
Start: 2024-12-20 | End: 2024-12-24 | Stop reason: HOSPADM

## 2024-12-20 RX ORDER — KETOROLAC TROMETHAMINE 15 MG/ML
15 INJECTION, SOLUTION INTRAMUSCULAR; INTRAVENOUS EVERY 6 HOURS PRN
Status: ACTIVE | OUTPATIENT
Start: 2024-12-20 | End: 2024-12-23

## 2024-12-20 RX ORDER — HALOPERIDOL 5 MG/ML
1 INJECTION INTRAMUSCULAR
Status: DISCONTINUED | OUTPATIENT
Start: 2024-12-20 | End: 2024-12-20 | Stop reason: HOSPADM

## 2024-12-20 RX ORDER — SODIUM CHLORIDE, SODIUM LACTATE, POTASSIUM CHLORIDE, CALCIUM CHLORIDE 600; 310; 30; 20 MG/100ML; MG/100ML; MG/100ML; MG/100ML
INJECTION, SOLUTION INTRAVENOUS
Status: DISCONTINUED | OUTPATIENT
Start: 2024-12-20 | End: 2024-12-20 | Stop reason: SURG

## 2024-12-20 RX ORDER — LORAZEPAM 2 MG/ML
.5-1 INJECTION INTRAMUSCULAR EVERY 6 HOURS PRN
Status: DISCONTINUED | OUTPATIENT
Start: 2024-12-20 | End: 2024-12-24 | Stop reason: HOSPADM

## 2024-12-20 RX ORDER — ENOXAPARIN SODIUM 100 MG/ML
40 INJECTION SUBCUTANEOUS DAILY
Status: DISCONTINUED | OUTPATIENT
Start: 2024-12-21 | End: 2024-12-24 | Stop reason: HOSPADM

## 2024-12-20 RX ORDER — BUPIVACAINE HYDROCHLORIDE AND EPINEPHRINE 2.5; 5 MG/ML; UG/ML
INJECTION, SOLUTION EPIDURAL; INFILTRATION; INTRACAUDAL; PERINEURAL PRN
Status: DISCONTINUED | OUTPATIENT
Start: 2024-12-20 | End: 2024-12-20 | Stop reason: SURG

## 2024-12-20 RX ORDER — BUPROPION HYDROCHLORIDE 150 MG/1
150 TABLET, EXTENDED RELEASE ORAL 2 TIMES DAILY
Status: DISCONTINUED | OUTPATIENT
Start: 2024-12-20 | End: 2024-12-24 | Stop reason: HOSPADM

## 2024-12-20 RX ORDER — SODIUM CHLORIDE, SODIUM LACTATE, POTASSIUM CHLORIDE, CALCIUM CHLORIDE 600; 310; 30; 20 MG/100ML; MG/100ML; MG/100ML; MG/100ML
INJECTION, SOLUTION INTRAVENOUS CONTINUOUS
Status: DISCONTINUED | OUTPATIENT
Start: 2024-12-20 | End: 2024-12-20 | Stop reason: HOSPADM

## 2024-12-20 RX ORDER — DIPHENHYDRAMINE HYDROCHLORIDE 50 MG/ML
12.5 INJECTION INTRAMUSCULAR; INTRAVENOUS
Status: DISCONTINUED | OUTPATIENT
Start: 2024-12-20 | End: 2024-12-20 | Stop reason: HOSPADM

## 2024-12-20 RX ORDER — SODIUM CHLORIDE, SODIUM LACTATE, POTASSIUM CHLORIDE, CALCIUM CHLORIDE 600; 310; 30; 20 MG/100ML; MG/100ML; MG/100ML; MG/100ML
INJECTION, SOLUTION INTRAVENOUS CONTINUOUS
Status: ACTIVE | OUTPATIENT
Start: 2024-12-20 | End: 2024-12-20

## 2024-12-20 RX ORDER — CEFAZOLIN SODIUM 1 G/3ML
INJECTION, POWDER, FOR SOLUTION INTRAMUSCULAR; INTRAVENOUS PRN
Status: DISCONTINUED | OUTPATIENT
Start: 2024-12-20 | End: 2024-12-20 | Stop reason: SURG

## 2024-12-20 RX ORDER — LIDOCAINE HYDROCHLORIDE 40 MG/ML
SOLUTION TOPICAL PRN
Status: DISCONTINUED | OUTPATIENT
Start: 2024-12-20 | End: 2024-12-20 | Stop reason: SURG

## 2024-12-20 RX ORDER — PHENYLEPHRINE HCL IN 0.9% NACL 1 MG/10 ML
SYRINGE (ML) INTRAVENOUS PRN
Status: DISCONTINUED | OUTPATIENT
Start: 2024-12-20 | End: 2024-12-20 | Stop reason: SURG

## 2024-12-20 RX ORDER — OXYCODONE HCL 5 MG/5 ML
10 SOLUTION, ORAL ORAL
Status: COMPLETED | OUTPATIENT
Start: 2024-12-20 | End: 2024-12-20

## 2024-12-20 RX ORDER — MIDAZOLAM HYDROCHLORIDE 1 MG/ML
INJECTION INTRAMUSCULAR; INTRAVENOUS PRN
Status: DISCONTINUED | OUTPATIENT
Start: 2024-12-20 | End: 2024-12-20 | Stop reason: SURG

## 2024-12-20 RX ORDER — MORPHINE SULFATE 4 MG/ML
2-4 INJECTION INTRAVENOUS
Status: DISCONTINUED | OUTPATIENT
Start: 2024-12-20 | End: 2024-12-24 | Stop reason: HOSPADM

## 2024-12-20 RX ORDER — ONDANSETRON 2 MG/ML
INJECTION INTRAMUSCULAR; INTRAVENOUS PRN
Status: DISCONTINUED | OUTPATIENT
Start: 2024-12-20 | End: 2024-12-20 | Stop reason: SURG

## 2024-12-20 RX ORDER — HYDROMORPHONE HYDROCHLORIDE 1 MG/ML
0.2 INJECTION, SOLUTION INTRAMUSCULAR; INTRAVENOUS; SUBCUTANEOUS
Status: DISCONTINUED | OUTPATIENT
Start: 2024-12-20 | End: 2024-12-20 | Stop reason: HOSPADM

## 2024-12-20 RX ORDER — EPHEDRINE SULFATE 50 MG/ML
INJECTION, SOLUTION INTRAVENOUS PRN
Status: DISCONTINUED | OUTPATIENT
Start: 2024-12-20 | End: 2024-12-20 | Stop reason: SURG

## 2024-12-20 RX ORDER — ONDANSETRON 2 MG/ML
4 INJECTION INTRAMUSCULAR; INTRAVENOUS
Status: DISCONTINUED | OUTPATIENT
Start: 2024-12-20 | End: 2024-12-20 | Stop reason: HOSPADM

## 2024-12-20 RX ORDER — ENALAPRILAT 1.25 MG/ML
2.5 INJECTION INTRAVENOUS EVERY 6 HOURS PRN
Status: DISCONTINUED | OUTPATIENT
Start: 2024-12-20 | End: 2024-12-24 | Stop reason: HOSPADM

## 2024-12-20 RX ORDER — HYDROMORPHONE HYDROCHLORIDE 2 MG/ML
INJECTION, SOLUTION INTRAMUSCULAR; INTRAVENOUS; SUBCUTANEOUS PRN
Status: DISCONTINUED | OUTPATIENT
Start: 2024-12-20 | End: 2024-12-20 | Stop reason: SURG

## 2024-12-20 RX ORDER — OXYCODONE HYDROCHLORIDE 5 MG/1
5-10 TABLET ORAL EVERY 4 HOURS PRN
Status: DISCONTINUED | OUTPATIENT
Start: 2024-12-20 | End: 2024-12-24 | Stop reason: HOSPADM

## 2024-12-20 RX ORDER — SODIUM CHLORIDE, SODIUM GLUCONATE, SODIUM ACETATE, POTASSIUM CHLORIDE AND MAGNESIUM CHLORIDE 526; 502; 368; 37; 30 MG/100ML; MG/100ML; MG/100ML; MG/100ML; MG/100ML
INJECTION, SOLUTION INTRAVENOUS
Status: DISCONTINUED | OUTPATIENT
Start: 2024-12-20 | End: 2024-12-20 | Stop reason: SURG

## 2024-12-20 RX ORDER — OXYCODONE HCL 5 MG/5 ML
5 SOLUTION, ORAL ORAL
Status: COMPLETED | OUTPATIENT
Start: 2024-12-20 | End: 2024-12-20

## 2024-12-20 RX ORDER — HYDROMORPHONE HYDROCHLORIDE 1 MG/ML
0.4 INJECTION, SOLUTION INTRAMUSCULAR; INTRAVENOUS; SUBCUTANEOUS
Status: DISCONTINUED | OUTPATIENT
Start: 2024-12-20 | End: 2024-12-20 | Stop reason: HOSPADM

## 2024-12-20 RX ORDER — OXYCODONE HYDROCHLORIDE 5 MG/1
10 TABLET ORAL EVERY 4 HOURS PRN
Status: DISCONTINUED | OUTPATIENT
Start: 2024-12-20 | End: 2024-12-24 | Stop reason: HOSPADM

## 2024-12-20 RX ORDER — ROCURONIUM BROMIDE 10 MG/ML
INJECTION, SOLUTION INTRAVENOUS PRN
Status: DISCONTINUED | OUTPATIENT
Start: 2024-12-20 | End: 2024-12-20 | Stop reason: SURG

## 2024-12-20 RX ORDER — DIPHENHYDRAMINE HYDROCHLORIDE 50 MG/ML
25 INJECTION INTRAMUSCULAR; INTRAVENOUS EVERY 6 HOURS PRN
Status: DISCONTINUED | OUTPATIENT
Start: 2024-12-20 | End: 2024-12-24 | Stop reason: HOSPADM

## 2024-12-20 RX ORDER — FAMOTIDINE 20 MG/1
20 TABLET, FILM COATED ORAL 2 TIMES DAILY
Status: DISCONTINUED | OUTPATIENT
Start: 2024-12-20 | End: 2024-12-24 | Stop reason: HOSPADM

## 2024-12-20 RX ORDER — DEXAMETHASONE SODIUM PHOSPHATE 4 MG/ML
INJECTION, SOLUTION INTRA-ARTICULAR; INTRALESIONAL; INTRAMUSCULAR; INTRAVENOUS; SOFT TISSUE PRN
Status: DISCONTINUED | OUTPATIENT
Start: 2024-12-20 | End: 2024-12-20 | Stop reason: SURG

## 2024-12-20 RX ORDER — KETOROLAC TROMETHAMINE 15 MG/ML
INJECTION, SOLUTION INTRAMUSCULAR; INTRAVENOUS PRN
Status: DISCONTINUED | OUTPATIENT
Start: 2024-12-20 | End: 2024-12-20 | Stop reason: SURG

## 2024-12-20 RX ADMIN — ROCURONIUM BROMIDE 30 MG: 50 INJECTION, SOLUTION INTRAVENOUS at 15:54

## 2024-12-20 RX ADMIN — OXYCODONE HYDROCHLORIDE 10 MG: 10 TABLET ORAL at 23:38

## 2024-12-20 RX ADMIN — FAMOTIDINE 20 MG: 20 TABLET, FILM COATED ORAL at 20:41

## 2024-12-20 RX ADMIN — SODIUM CHLORIDE, POTASSIUM CHLORIDE, SODIUM LACTATE AND CALCIUM CHLORIDE: 600; 310; 30; 20 INJECTION, SOLUTION INTRAVENOUS at 17:29

## 2024-12-20 RX ADMIN — EPHEDRINE SULFATE 5 MG: 50 INJECTION, SOLUTION INTRAVENOUS at 16:10

## 2024-12-20 RX ADMIN — Medication 100 MCG: at 15:56

## 2024-12-20 RX ADMIN — OXYCODONE HYDROCHLORIDE 10 MG: 5 SOLUTION ORAL at 18:06

## 2024-12-20 RX ADMIN — BUPIVACAINE HYDROCHLORIDE AND EPINEPHRINE BITARTRATE 30 ML: 2.5; .0091 INJECTION, SOLUTION EPIDURAL; INFILTRATION; INTRACAUDAL; PERINEURAL at 17:22

## 2024-12-20 RX ADMIN — ROCURONIUM BROMIDE 70 MG: 50 INJECTION, SOLUTION INTRAVENOUS at 14:54

## 2024-12-20 RX ADMIN — Medication 200 MCG: at 17:18

## 2024-12-20 RX ADMIN — FENTANYL CITRATE 50 MCG: 50 INJECTION, SOLUTION INTRAMUSCULAR; INTRAVENOUS at 19:54

## 2024-12-20 RX ADMIN — SODIUM CHLORIDE, POTASSIUM CHLORIDE, SODIUM LACTATE AND CALCIUM CHLORIDE: 600; 310; 30; 20 INJECTION, SOLUTION INTRAVENOUS at 12:54

## 2024-12-20 RX ADMIN — MIDAZOLAM HYDROCHLORIDE 2 MG: 1 INJECTION, SOLUTION INTRAMUSCULAR; INTRAVENOUS at 14:54

## 2024-12-20 RX ADMIN — FENTANYL CITRATE 50 MCG: 50 INJECTION, SOLUTION INTRAMUSCULAR; INTRAVENOUS at 19:59

## 2024-12-20 RX ADMIN — CEFAZOLIN 3 G: 1 INJECTION, POWDER, FOR SOLUTION INTRAMUSCULAR; INTRAVENOUS at 15:02

## 2024-12-20 RX ADMIN — EPHEDRINE SULFATE 10 MG: 50 INJECTION, SOLUTION INTRAVENOUS at 17:18

## 2024-12-20 RX ADMIN — METHOCARBAMOL 1000 MG: 100 INJECTION, SOLUTION INTRAMUSCULAR; INTRAVENOUS at 18:06

## 2024-12-20 RX ADMIN — KETOROLAC TROMETHAMINE 15 MG: 15 INJECTION, SOLUTION INTRAMUSCULAR; INTRAVENOUS at 15:25

## 2024-12-20 RX ADMIN — ONDANSETRON 4 MG: 2 INJECTION INTRAMUSCULAR; INTRAVENOUS at 17:01

## 2024-12-20 RX ADMIN — HYDROMORPHONE HYDROCHLORIDE 0.5 MG: 2 INJECTION INTRAMUSCULAR; INTRAVENOUS; SUBCUTANEOUS at 15:42

## 2024-12-20 RX ADMIN — LIDOCAINE HYDROCHLORIDE 4 ML: 40 SOLUTION TOPICAL at 14:56

## 2024-12-20 RX ADMIN — SODIUM CHLORIDE, SODIUM GLUCONATE, SODIUM ACETATE, POTASSIUM CHLORIDE AND MAGNESIUM CHLORIDE: 526; 502; 368; 37; 30 INJECTION, SOLUTION INTRAVENOUS at 15:47

## 2024-12-20 RX ADMIN — HYDROMORPHONE HYDROCHLORIDE 0.4 MG: 1 INJECTION, SOLUTION INTRAMUSCULAR; INTRAVENOUS; SUBCUTANEOUS at 20:06

## 2024-12-20 RX ADMIN — SODIUM CHLORIDE, SODIUM LACTATE, POTASSIUM CHLORIDE, AND CALCIUM CHLORIDE: .6; .31; .03; .02 INJECTION, SOLUTION INTRAVENOUS at 20:42

## 2024-12-20 RX ADMIN — Medication 100 MCG: at 15:37

## 2024-12-20 RX ADMIN — SUGAMMADEX 200 MG: 100 INJECTION, SOLUTION INTRAVENOUS at 17:21

## 2024-12-20 RX ADMIN — HYDROMORPHONE HYDROCHLORIDE 0.5 MG: 2 INJECTION INTRAMUSCULAR; INTRAVENOUS; SUBCUTANEOUS at 15:20

## 2024-12-20 RX ADMIN — PROPOFOL 200 MG: 10 INJECTION, EMULSION INTRAVENOUS at 14:54

## 2024-12-20 RX ADMIN — DEXAMETHASONE SODIUM PHOSPHATE 8 MG: 4 INJECTION INTRA-ARTICULAR; INTRALESIONAL; INTRAMUSCULAR; INTRAVENOUS; SOFT TISSUE at 15:22

## 2024-12-20 RX ADMIN — HYDROMORPHONE HYDROCHLORIDE 1 MG: 2 INJECTION INTRAMUSCULAR; INTRAVENOUS; SUBCUTANEOUS at 14:54

## 2024-12-20 ASSESSMENT — PATIENT HEALTH QUESTIONNAIRE - PHQ9
SUM OF ALL RESPONSES TO PHQ9 QUESTIONS 1 AND 2: 0
2. FEELING DOWN, DEPRESSED, IRRITABLE, OR HOPELESS: NOT AT ALL
1. LITTLE INTEREST OR PLEASURE IN DOING THINGS: NOT AT ALL

## 2024-12-20 ASSESSMENT — COGNITIVE AND FUNCTIONAL STATUS - GENERAL
SUGGESTED CMS G CODE MODIFIER DAILY ACTIVITY: CJ
MOVING TO AND FROM BED TO CHAIR: A LITTLE
MOBILITY SCORE: 19
WALKING IN HOSPITAL ROOM: A LITTLE
HELP NEEDED FOR BATHING: A LITTLE
TURNING FROM BACK TO SIDE WHILE IN FLAT BAD: A LITTLE
DAILY ACTIVITIY SCORE: 21
CLIMB 3 TO 5 STEPS WITH RAILING: A LITTLE
DRESSING REGULAR UPPER BODY CLOTHING: A LITTLE
DRESSING REGULAR LOWER BODY CLOTHING: A LITTLE
SUGGESTED CMS G CODE MODIFIER MOBILITY: CK
MOVING FROM LYING ON BACK TO SITTING ON SIDE OF FLAT BED: A LITTLE

## 2024-12-20 ASSESSMENT — PAIN DESCRIPTION - PAIN TYPE
TYPE: ACUTE PAIN
TYPE: ACUTE PAIN;SURGICAL PAIN
TYPE: ACUTE PAIN

## 2024-12-20 ASSESSMENT — SOCIAL DETERMINANTS OF HEALTH (SDOH)
IN THE PAST 12 MONTHS, HAS THE ELECTRIC, GAS, OIL, OR WATER COMPANY THREATENED TO SHUT OFF SERVICE IN YOUR HOME?: NO
WITHIN THE LAST YEAR, HAVE YOU BEEN HUMILIATED OR EMOTIONALLY ABUSED IN OTHER WAYS BY YOUR PARTNER OR EX-PARTNER?: NO
WITHIN THE PAST 12 MONTHS, THE FOOD YOU BOUGHT JUST DIDN'T LAST AND YOU DIDN'T HAVE MONEY TO GET MORE: NEVER TRUE
WITHIN THE PAST 12 MONTHS, YOU WORRIED THAT YOUR FOOD WOULD RUN OUT BEFORE YOU GOT THE MONEY TO BUY MORE: NEVER TRUE
WITHIN THE LAST YEAR, HAVE YOU BEEN KICKED, HIT, SLAPPED, OR OTHERWISE PHYSICALLY HURT BY YOUR PARTNER OR EX-PARTNER?: NO
WITHIN THE LAST YEAR, HAVE YOU BEEN AFRAID OF YOUR PARTNER OR EX-PARTNER?: NO
WITHIN THE LAST YEAR, HAVE TO BEEN RAPED OR FORCED TO HAVE ANY KIND OF SEXUAL ACTIVITY BY YOUR PARTNER OR EX-PARTNER?: NO

## 2024-12-20 ASSESSMENT — LIFESTYLE VARIABLES
ON A TYPICAL DAY WHEN YOU DRINK ALCOHOL HOW MANY DRINKS DO YOU HAVE: 0
EVER HAD A DRINK FIRST THING IN THE MORNING TO STEADY YOUR NERVES TO GET RID OF A HANGOVER: NO
TOTAL SCORE: 0
TOTAL SCORE: 0
HOW MANY TIMES IN THE PAST YEAR HAVE YOU HAD 5 OR MORE DRINKS IN A DAY: 0
ALCOHOL_USE: NO
AVERAGE NUMBER OF DAYS PER WEEK YOU HAVE A DRINK CONTAINING ALCOHOL: 0
EVER FELT BAD OR GUILTY ABOUT YOUR DRINKING: NO
HAVE YOU EVER FELT YOU SHOULD CUT DOWN ON YOUR DRINKING: NO
HAVE PEOPLE ANNOYED YOU BY CRITICIZING YOUR DRINKING: NO
TOTAL SCORE: 0
CONSUMPTION TOTAL: NEGATIVE

## 2024-12-20 ASSESSMENT — PAIN SCALES - GENERAL: PAIN_LEVEL: 2

## 2024-12-20 ASSESSMENT — FIBROSIS 4 INDEX: FIB4 SCORE: 1.63

## 2024-12-20 NOTE — ANESTHESIA PROCEDURE NOTES
Airway    Date/Time: 12/20/2024 2:56 PM    Performed by: Mekhi Tran M.D.  Authorized by: Mekhi Tran M.D.    Location:  OR  Urgency:  Elective  Indications for Airway Management:  Anesthesia      Spontaneous Ventilation: absent    Sedation Level:  Deep  Preoxygenated: Yes    Patient Position:  Sniffing  Final Airway Type:  Endotracheal airway  Final Endotracheal Airway:  ETT - double lumen left with ONE LUNG VENTILATION  Cuffed: Yes    Technique Used for Successful ETT Placement:  Direct laryngoscopy    Insertion Site:  Oral  Blade Type:  Yordy  Laryngoscope Blade/Videolaryngoscope Blade Size:  4  ETT Double Lumen (fr):  41  Measured from:  Teeth  ETT to Teeth (cm):  0  Placement Verified by: auscultation and capnometry    Cormack-Lehane Classification:  Grade I - full view of glottis  Number of Attempts at Approach:  1

## 2024-12-20 NOTE — ANESTHESIA PROCEDURE NOTES
Arterial Line    Performed by: Mekhi Tran M.D.  Authorized by: Mekhi Tran M.D.    Start Time:  12/20/2024 2:51 PM  Localization: surface landmarks    Patient Location:  OR  Indication: continuous blood pressure monitoring        Catheter Size:  20 G  Seldinger Technique?: Yes    Laterality:  Left  Site:  Radial artery  Line Secured:  Antimicrobial disc, tape and transparent dressing  Events: patient tolerated procedure well with no complications

## 2024-12-20 NOTE — PROGRESS NOTES
Pharmacy Medication Reconciliation      ~Medication reconciliation updated and complete per patient   ~Allergies have been verified and updated   ~No oral ABX within the last 30 days  ~Patient home pharmacy :  [facility] [phone]      ~Anticoagulants (rivaroxaban, apixaban, edoxaban, dabigatran, warfarin, enoxaparin) taken in the last 14 days? No

## 2024-12-20 NOTE — ANESTHESIA PREPROCEDURE EVALUATION
Case: [id number] Date/Time: 12/20/24 1400    Procedures:       RIGHT THORACOTOMY, COMPLETION PNEUMONECTOMY, NODE DISSECTION      LYMPHADENECTOMY    Pre-op diagnosis: MALIGNANT TUMOR OF LUNG-RIGHT    Location: [facility] OR 08 / SURGERY [facility]    Surgeons: John H Ganser, M.D.            Relevant Problems   Other   (positive) Mediastinal adenopathy       Physical Exam    Airway   Mallampati: II  TM distance: >3 FB  Neck ROM: full       Cardiovascular - normal exam  Rhythm: regular  Rate: normal  (-) murmur     Dental - normal exam           Pulmonary - normal exam  Breath sounds clear to auscultation  (+) decreased breath sounds     Abdominal    Neurological - normal exam         Other findings: Minimal BS onR              Anesthesia Plan    ASA 3   ASA physical status 3 criteria: other (comment)    Plan - general       Airway plan will be ETT    (MOHIT , A loine, erector spinae block)      Induction: intravenous    Postoperative Plan: Postoperative administration of opioids is intended.    Pertinent diagnostic labs and testing reviewed    Informed Consent:    Anesthetic plan and risks discussed with patient.    Use of blood products discussed with: patient whom consented to blood products.

## 2024-12-21 ENCOUNTER — APPOINTMENT (OUTPATIENT)
Dept: RADIOLOGY | Facility: MEDICAL CENTER | Age: 59
DRG: 165 | End: 2024-12-21
Attending: PHYSICIAN ASSISTANT
Payer: COMMERCIAL

## 2024-12-21 LAB
ANION GAP SERPL CALC-SCNC: 9 MMOL/L (ref 7–16)
BUN SERPL-MCNC: 17 MG/DL (ref 8–22)
CALCIUM SERPL-MCNC: 8 MG/DL (ref 8.5–10.5)
CHLORIDE SERPL-SCNC: 102 MMOL/L (ref 96–112)
CO2 SERPL-SCNC: 27 MMOL/L (ref 20–33)
CREAT SERPL-MCNC: 0.74 MG/DL (ref 0.5–1.4)
EKG IMPRESSION: NORMAL
ERYTHROCYTE [DISTWIDTH] IN BLOOD BY AUTOMATED COUNT: 48.4 FL (ref 35.9–50)
GFR SERPLBLD CREATININE-BSD FMLA CKD-EPI: 106 ML/MIN/1.73 M 2
GLUCOSE SERPL-MCNC: 178 MG/DL (ref 65–99)
HCT VFR BLD AUTO: 34.9 % (ref 42–52)
HGB BLD-MCNC: 11.4 G/DL (ref 14–18)
MCH RBC QN AUTO: 33.3 PG (ref 27–33)
MCHC RBC AUTO-ENTMCNC: 32.7 G/DL (ref 32.3–36.5)
MCV RBC AUTO: 102 FL (ref 81.4–97.8)
PLATELET # BLD AUTO: 159 K/UL (ref 164–446)
PMV BLD AUTO: 10 FL (ref 9–12.9)
POTASSIUM SERPL-SCNC: 4.4 MMOL/L (ref 3.6–5.5)
RBC # BLD AUTO: 3.42 M/UL (ref 4.7–6.1)
SODIUM SERPL-SCNC: 138 MMOL/L (ref 135–145)
WBC # BLD AUTO: 11.1 K/UL (ref 4.8–10.8)

## 2024-12-21 PROCEDURE — 71045 X-RAY EXAM CHEST 1 VIEW: CPT

## 2024-12-21 PROCEDURE — 85027 COMPLETE CBC AUTOMATED: CPT

## 2024-12-21 PROCEDURE — 700111 HCHG RX REV CODE 636 W/ 250 OVERRIDE (IP): Mod: JZ | Performed by: PHYSICIAN ASSISTANT

## 2024-12-21 PROCEDURE — 700102 HCHG RX REV CODE 250 W/ 637 OVERRIDE(OP): Performed by: PHYSICIAN ASSISTANT

## 2024-12-21 PROCEDURE — 770001 HCHG ROOM/CARE - MED/SURG/GYN PRIV*

## 2024-12-21 PROCEDURE — 93010 ELECTROCARDIOGRAM REPORT: CPT | Performed by: INTERNAL MEDICINE

## 2024-12-21 PROCEDURE — 36415 COLL VENOUS BLD VENIPUNCTURE: CPT

## 2024-12-21 PROCEDURE — 80048 BASIC METABOLIC PNL TOTAL CA: CPT

## 2024-12-21 PROCEDURE — A9270 NON-COVERED ITEM OR SERVICE: HCPCS | Performed by: PHYSICIAN ASSISTANT

## 2024-12-21 RX ADMIN — OXYCODONE HYDROCHLORIDE 10 MG: 10 TABLET ORAL at 09:06

## 2024-12-21 RX ADMIN — FAMOTIDINE 20 MG: 20 TABLET, FILM COATED ORAL at 17:43

## 2024-12-21 RX ADMIN — FAMOTIDINE 20 MG: 20 TABLET, FILM COATED ORAL at 04:34

## 2024-12-21 RX ADMIN — BUPROPION HYDROCHLORIDE 150 MG: 150 TABLET, FILM COATED, EXTENDED RELEASE ORAL at 17:43

## 2024-12-21 RX ADMIN — BUPROPION HYDROCHLORIDE 150 MG: 150 TABLET, FILM COATED, EXTENDED RELEASE ORAL at 04:34

## 2024-12-21 RX ADMIN — OXYCODONE HYDROCHLORIDE 10 MG: 10 TABLET ORAL at 16:11

## 2024-12-21 RX ADMIN — OXYCODONE HYDROCHLORIDE 10 MG: 10 TABLET ORAL at 20:49

## 2024-12-21 RX ADMIN — ENOXAPARIN SODIUM 40 MG: 100 INJECTION SUBCUTANEOUS at 10:26

## 2024-12-21 ASSESSMENT — PAIN DESCRIPTION - PAIN TYPE
TYPE: ACUTE PAIN

## 2024-12-21 NOTE — PROGRESS NOTES
Received report of patient at start of shift. Patient is AOx4, wife at bedside visiting. PRN oxycodone administered for complaints of pain. Assessment complete. Chest tube removed this morning by Dr. Ganser. Cast catheter removed at 1030 by this RN. Patient ambulates with SBA. Patient updated on plan of care, encouraged to notify staff for any needs/assistance. Call light within reach.

## 2024-12-21 NOTE — PROGRESS NOTES
4 Eyes Skin Assessment Completed by JAYNE Rae and JAYNE Taylor.    Head WDL  Ears WDL  Nose WDL  Mouth WDL  Neck WDL  Breast/Chest WDL  Shoulder Blades WDL  Spine WDL  Chest tube to R flank with DIP  (R) Arm/Elbow/Hand WDL  (L) Arm/Elbow/Hand scab to back of upper arm  Abdomen WDL  Groin doyle  Scrotum/Coccyx/Buttocks WDL  (R) Leg WDL  (L) Leg WDL  (R) Heel/Foot/Toe WDL  (L) Heel/Foot/Toe WDL          Devices In Places Blood Pressure Cuff, Pulse Ox, and Oxy Mask, chest tube      Interventions In Place Pillows    Possible Skin Injury No    Pictures Uploaded Into Epic N/A  Wound Consult Placed N/A  RN Wound Prevention Protocol Ordered No

## 2024-12-21 NOTE — CARE PLAN
The patient is Stable - Low risk of patient condition declining or worsening    Shift Goals  Clinical Goals: pain control, monitor chest tube, pulmonary hygiene  Patient Goals: rest, pain control,  Family Goals: update on POC    Progress made toward(s) clinical / shift goals:  Pt medicated per MAR, resting. Pain controlled through orders. Chest tube patent. Pt utilizing IS. Cast in place, encouraging fluid intake. Updated pt on POC.       Problem: Pain - Standard  Goal: Alleviation of pain or a reduction in pain to the patient’s comfort goal  Outcome: Progressing     Problem: Knowledge Deficit - Standard  Goal: Patient and family/care givers will demonstrate understanding of plan of care, disease process/condition, diagnostic tests and medications  Outcome: Progressing       Patient is not progressing towards the following goals:

## 2024-12-21 NOTE — ANESTHESIA PROCEDURE NOTES
Peripheral Block    Date/Time: 12/20/2024 5:13 PM    Performed by: Mekhi Tran M.D.  Authorized by: Mekhi Tran M.D.    Patient Location:  OR  Start Time:  12/20/2024 5:13 PM  Reason for Block: at surgeon's request and post-op pain management ONLY    patient identified, IV checked, site marked, risks and benefits discussed, surgical consent, monitors and equipment checked, pre-op evaluation and timeout performed    Patient Position:  Supine  Prep: ChloraPrep    Monitoring:  Heart rate, continuous pulse ox and cardiac monitor  Block Region:  Trunk  Trunk - Block Type:  Other  Other Block Type: Erector spinae block  Laterality:  Right  Procedures: ultrasound guided  Image captured, interpreted and electronically stored.  Local Infiltration:  Lidocaine  Strength:  1 %  Dose:  3 ml  Block Type:  Single-shot  Needle Length:  100mm  Needle Gauge:  21 G  Needle Localization:  Ultrasound guidance  Ultrasound picture in chart  Injection Assessment:  Negative aspiration for heme, no paresthesia on injection, incremental injection and local visualized surrounding nerve on ultrasound

## 2024-12-21 NOTE — PROGRESS NOTES
"Progress Note:    S: Doing well  Pain controlled  Using IS    O:  Recent Labs     12/20/24  1250 12/21/24  0417   WBC 5.8 11.1*   RBC 4.26* 3.42*   HEMOGLOBIN 14.5 11.4*   HEMATOCRIT 41.4* 34.9*   MCV 97.2 102.0*   MCH 34.0* 33.3*   MCHC 35.0 32.7   RDW 46.2 48.4   PLATELETCT 173 159*   MPV 9.8 10.0     Recent Labs     12/20/24  1250 12/21/24  0417   SODIUM 140 138   POTASSIUM 3.5* 4.4   CHLORIDE 103 102   CO2 26 27   GLUCOSE 107* 178*   BUN 16 17   CREATININE 0.73 0.74   CALCIUM 8.7 8.0*         Current Facility-Administered Medications   Medication Dose    buPROPion SR (Wellbutrin-SR) tablet 150 mg  150 mg    lactated ringers infusion      enoxaparin (Lovenox) inj 40 mg  40 mg    Pharmacy Consult Request ...Pain Management Review 1 Each  1 Each    ondansetron (Zofran) syringe/vial injection 4 mg  4 mg    diphenhydrAMINE (Benadryl) injection 25 mg  25 mg    diphenhydrAMINE (Benadryl) tablet/capsule 25 mg  25 mg    Or    diphenhydrAMINE (Benadryl) injection 25 mg  25 mg    benzocaine-menthol (Cepacol) lozenge 1 Lozenge  1 Lozenge    methocarbamol (Robaxin) injection 1,000 mg  1,000 mg    morphine 4 MG/ML injection 2-4 mg  2-4 mg    LORazepam (Ativan) injection 0.5-1 mg  0.5-1 mg    enalaprilat injection 2.5 mg 2 mL  2.5 mg    famotidine (Pepcid) tablet 20 mg  20 mg    Or    famotidine (Pepcid) injection 20 mg  20 mg    hydrALAZINE (Apresoline) injection 10 mg  10 mg    oxyCODONE immediate-release (Roxicodone) tablet 5-10 mg  5-10 mg    Or    oxyCODONE immediate-release (Roxicodone) tablet 10 mg  10 mg    ondansetron (Zofran ODT) dispertab 4 mg  4 mg    ketorolac (Toradol) 15 MG/ML injection 15 mg  15 mg       PE:  /63   Pulse 83   Temp 36.8 °C (98.2 °F) (Temporal)   Resp 18   Ht 1.93 m (6' 4\")   Wt (!) 126 kg (277 lb 9 oz)   SpO2 97%     Intake/Output Summary (Last 24 hours) at 12/21/2024 0941  Last data filed at 12/21/2024 0857  Gross per 24 hour   Intake 2518.25 ml   Output 1895 ml   Net 623.25 ml "       Alert, NAD  Chest clear left  Heart regular  Chest tube: 400 ml serosanguinous    Rads:  DX-CHEST-PORTABLE (1 VIEW)   Final Result         1. No significant change large right pneumothorax with pneumonectomy type surgical clips and chest tube. Slight increase chest wall subcutaneous emphysema.                     DX-CHEST-LIMITED (1 VIEW)   Final Result      1.  Large right-sided pneumothorax with a right chest tube present which is expected after pneumonectomy.      2.  Multiple right rib fractures.      3.  Surgical changes involving the right thorax.      4.  This was discussed with JOHN H GANSER at 8:50 PM on 12/20/2024.          A:   Active Hospital Problems    Diagnosis     Adenocarcinoma, lung (HCC) [C34.90]      Diagnosed September 2022.  Patient underwent chemo and surgery (right middle and lower lobe removed).  Follows up with oncology (Dr. Howe) and gets CT every 3 months.  Nov 2024 PET showed 3.7 cm mass suspicious for recurrent lung carcinoma and additional 0.8 cm RUL nodule suspicious for carcinoma.           P: Chest tube removed  D/C Cast  Ambulate/IS  Recheck CBC in AM  Await path    John Ganser M.D.  [facility]

## 2024-12-21 NOTE — OP REPORT
DATE OF SERVICE:  12/20/2024     PREOPERATIVE DIAGNOSIS:  Recurrent right upper lobe lung cancer.     POSTOPERATIVE DIAGNOSIS:  Recurrent right upper lobe lung cancer.     PROCEDURE:  Right thoracotomy with completion pneumonectomy and node sampling.     SURGEON:  John H. Ganser, MD     ASSISTANT:  Frantz Coffman PA-C     ANESTHESIA:  General.     ANESTHESIOLOGIST:  eMkhi Tran MD     INDICATIONS:  The patient is a 59-year-old male diagnosed previously with a   large right lower lobe lung cancer.  In 2/2023, he underwent a right   thoracotomy with middle and lower lobectomy.  On surveillance, he was found to   have a new mass a year ago in the right upper lobe, which was treated with   radiation.    Followup CT scans more recently showed enlargement of the area of scarring at the   prior radiation and a new nodule in the upper lobe.  PET scan shows uptake in   both areas.  Risks, benefits and alternatives to completion pneumonectomy were   outlined in detail.  Questions were answered and he wished to proceed.  His   lung function was repeated and appears amenable to pneumonectomy, especially   given the extensive scarring in the right upper lobe.     FINDINGS:  There were extensive adhesions between the lung and the parietal   pleura at the old thoracotomy incision, but all of the lung was able to be   freed and removed.  There was scarring at the hilum making the hilar   dissection somewhat difficult, but completion pneumonectomy was able to be   carried out.     DESCRIPTION OF PROCEDURE:  The patient was identified, general anesthetic   administered with a double-lumen endotracheal tube.  He was placed in left   lateral decubitus position and his right chest prepped and draped in the usual   sterile fashion.  Prior thoracotomy incision was reopened and dissection   carried through subcutaneous tissues.  The scarring in the latissimus was   divided and the serratus divided anteriorly.  I had initially  planned to enter   the chest at one rib above, but this would be fairly high and so, the old   intercostal incision was reopened with cautery in approximately the fifth   intercostal space.  To facilitate exposure, I divided the fourth rib posteriorly. Extensive scarring between the lung and undersurface of the thoracotomy scar were taken down with combination of blunt and cautery   dissection and the lung completely freed down to the hilum.  Rib spreaders   were inserted.     Dissection was begun by dividing the posterior hilar pleura.  He has had a   prior middle and lower lobectomy, so there was extensive scarring around the   hilum there.  I was able to separate the azygos vein away from the apex   overlying the right main bronchus.  I then attempted to dissect anteriorly and   this was very dense from previous lymph node resections and surgery.  I was   ultimately able to dissect down to the apical arterial branch which was   ligated proximally and distally with 0 silk and a clip applied proximally and   was divided with good control. Nodes in the hilum were dissected out and sent as station 10. I was then able to circumferentially dissect the remainder of the main pulmonary artery, which was circumferentially dissected   and divided with the [place] 45 powered stapler using a gray load passed   through a 12 mm trocar placed in the low chest to facilitate the correct   angle.  I was then able to dissect around the superior pulmonary vein, which   was divided with the stapler using a vascular load with good control.  This   allowed me to free up the bronchus so that I was able to take the right main   bronchus proximally and not leave a long stump.  Anesthesia passed a bronchoscope into   the right side of the double-lumen tube to confirm that the bronchus was divided very close to the brent.  The stapler was closed and fired.  Specimen was sent for permanent histology as right upper lobe.     The chest was  irrigated and hemostasis assured.  There was some bleeding at   the peribronchial area, and it was apparent that this was from adherent azygos   vein and this was controlled with Hemoclips.  I was unable to palpate any   significant mediastinal adenopathy, and there had been no uptake on his PET.    So, at this point given the difficulty of the procedure, and extensive scarring I elected not to look   for any additional nodes.  A 19-Tajik Moses drain was passed through a   separate stab wound below the thoracotomy incision and laid within the bottom   of the chest cavity and attached to pleural suction device and secured to skin   with silk.  This will be left on waterseal overnight.  The hilar structures   were coated with aerosolized fibrin glue using the Vistaseal system.  The ribs   were then reapproximated with interrupted #1 Vicryl pericostal sutures, the   muscle in 2 layers with 0 Vicryl, and skin with staples.  Sterile dressings   were applied.     Anesthesia placed an erector spinae block per my request and the patient was returned to   recovery in stable condition.     An assistant was required in this case due to the complexity.  Their role   included retraction, suctioning, and wound closure.        ______________________________  JOHN H. GANSER, MD J[facility]/DAWIT    DD:  12/20/2024 17:21  DT:  12/20/2024 18:17    Job#:  [id number]    CC:MD Robert Lugo MD

## 2024-12-21 NOTE — ANESTHESIA POSTPROCEDURE EVALUATION
Patient: Babatunde Sullivan    Procedure Summary       Date: 12/20/24 Room / Location: [facility] 08 / SURGERY [facility]    Anesthesia Start: 1445 Anesthesia Stop:     Procedures:       RIGHT THORACOTOMY, COMPLETION PNEUMONECTOMY, NODE DISSECTION (Right: Chest)      LYMPHADENECTOMY (Right: Chest) Diagnosis: (MALIGNANT TUMOR OF LUNG-RIGHT)    Surgeons: John H Ganser, M.D. Responsible Provider: Mekhi Tran M.D.    Anesthesia Type: general ASA Status: 3            Final Anesthesia Type: general  Last vitals  BP   Blood Pressure: 120/60    Temp   36.5 °C (97.7 °F)    Pulse   75   Resp   16    SpO2   99 %      Anesthesia Post Evaluation    Patient location during evaluation: PACU  Patient participation: complete - patient participated  Level of consciousness: awake and alert  Pain score: 2    Airway patency: patent  Anesthetic complications: no  Cardiovascular status: hemodynamically stable  Respiratory status: acceptable  Hydration status: euvolemic    PONV: none          No notable events documented.     Nurse Pain Score: 3 (NPRS)

## 2024-12-21 NOTE — PROGRESS NOTES
"Pt  admitted to room T409 via transport in [facility] from PACU at 2020.  Pt pain reported at 6 on a scale of 0-10, pt declining pain medication at this time, pillow for bracing. Oriented to room call light and smoking policy.  Reviewed plan of care (equipment, incentive spirometer, sequential compression devices, medications, activity, diet, fall precautions, skin care, and pain) with patient and family. Welcome packet given and reviewed with pt and family , all questions answered. Education provided on oral hygiene program.     /63   Pulse 77   Temp 36.5 °C (97.7 °F) (Temporal)   Resp 16   Ht 1.93 m (6' 4\")   Wt (!) 126 kg (277 lb 9 oz)   SpO2 99%   BMI 33.79 kg/m²       AA&Ox4. Denies CP/SOB.  See 2 RN skin note  Tolerating regular diet. Denies N/V.  + void. + BM. Last BM 12/20 PTA  Pt ambulates SBA/x1.  All needs met at this time. Call light within reach. Pt calls appropriately. Bed low and locked, non skid socks in place. Hourly rounding in place.  "

## 2024-12-21 NOTE — ANESTHESIA TIME REPORT
Anesthesia Start and Stop Event Times       Date Time Event    12/20/2024 1428 Ready for Procedure     1445 Anesthesia Start          Responsible Staff  12/20/24      Name Role Begin End    Mekhi Tran M.D. Anesth 1445           Overtime Reason:  overtime    Comments:

## 2024-12-21 NOTE — OR SURGEON
Immediate Post OP Note    PreOp Diagnosis: Recurrent right lung cancer      PostOp Diagnosis: Same      Procedure(s):  RIGHT THORACOTOMY, COMPLETION PNEUMONECTOMY, NODE DISSECTION - Wound Class: Clean Contaminated      Surgeon(s):  John H Ganser, M.D.    Anesthesiologist/Type of Anesthesia:  Anesthesiologist: Mekhi Tran M.D./General    Surgical Staff:  Assistant: EVA Perry  Circulator: Anthony Kinney R.N.  Relief Scrub: Lisa Zhao  Scrub Person: Davon Medrano; Hugo Bonilla  Count [place]: Lars James R.N.; Doris Walker R.N.    Specimens removed if any:  ID Type Source Tests Collected by Time Destination   A : STATION 10: RIGHT HILAR NODE Other Other PATHOLOGY SPECIMEN John H Ganser, M.D. 12/20/2024  4:19 PM    B : RIGHT UPPER LOBE Other Other PATHOLOGY SPECIMEN John H Ganser, M.D. 12/20/2024  4:39 PM        Estimated Blood Loss: 500 ml    Findings: Extensive scarring between lung and old thoracotomy incision, and in hilum from previous surgery    Complications: 0        12/20/2024 5:11 PM John H Ganser, M.D.

## 2024-12-21 NOTE — PROGRESS NOTES
Pt awake and oriented x4, medicated per MAR for pain, VSS. Dressing CDI, pleurovac to water seal with min drainage. Tolerating water with no nausea.

## 2024-12-22 LAB
ERYTHROCYTE [DISTWIDTH] IN BLOOD BY AUTOMATED COUNT: 48.8 FL (ref 35.9–50)
HCT VFR BLD AUTO: 29.8 % (ref 42–52)
HGB BLD-MCNC: 9.7 G/DL (ref 14–18)
MCH RBC QN AUTO: 33.3 PG (ref 27–33)
MCHC RBC AUTO-ENTMCNC: 32.6 G/DL (ref 32.3–36.5)
MCV RBC AUTO: 102.4 FL (ref 81.4–97.8)
PLATELET # BLD AUTO: 136 K/UL (ref 164–446)
PMV BLD AUTO: 9.9 FL (ref 9–12.9)
RBC # BLD AUTO: 2.91 M/UL (ref 4.7–6.1)
WBC # BLD AUTO: 10.6 K/UL (ref 4.8–10.8)

## 2024-12-22 PROCEDURE — 36415 COLL VENOUS BLD VENIPUNCTURE: CPT

## 2024-12-22 PROCEDURE — 700102 HCHG RX REV CODE 250 W/ 637 OVERRIDE(OP): Performed by: PHYSICIAN ASSISTANT

## 2024-12-22 PROCEDURE — A9270 NON-COVERED ITEM OR SERVICE: HCPCS | Performed by: PHYSICIAN ASSISTANT

## 2024-12-22 PROCEDURE — 85027 COMPLETE CBC AUTOMATED: CPT

## 2024-12-22 PROCEDURE — 770001 HCHG ROOM/CARE - MED/SURG/GYN PRIV*

## 2024-12-22 PROCEDURE — 700111 HCHG RX REV CODE 636 W/ 250 OVERRIDE (IP): Mod: JZ | Performed by: PHYSICIAN ASSISTANT

## 2024-12-22 RX ADMIN — FAMOTIDINE 20 MG: 20 TABLET, FILM COATED ORAL at 04:48

## 2024-12-22 RX ADMIN — OXYCODONE 10 MG: 5 TABLET ORAL at 13:01

## 2024-12-22 RX ADMIN — BUPROPION HYDROCHLORIDE 150 MG: 150 TABLET, FILM COATED, EXTENDED RELEASE ORAL at 04:48

## 2024-12-22 RX ADMIN — OXYCODONE HYDROCHLORIDE 10 MG: 10 TABLET ORAL at 07:57

## 2024-12-22 RX ADMIN — BUPROPION HYDROCHLORIDE 150 MG: 150 TABLET, FILM COATED, EXTENDED RELEASE ORAL at 16:16

## 2024-12-22 RX ADMIN — FAMOTIDINE 20 MG: 20 TABLET, FILM COATED ORAL at 16:16

## 2024-12-22 RX ADMIN — OXYCODONE HYDROCHLORIDE 10 MG: 10 TABLET ORAL at 17:52

## 2024-12-22 RX ADMIN — OXYCODONE HYDROCHLORIDE 10 MG: 10 TABLET ORAL at 03:19

## 2024-12-22 RX ADMIN — ENOXAPARIN SODIUM 40 MG: 100 INJECTION SUBCUTANEOUS at 04:48

## 2024-12-22 ASSESSMENT — PAIN DESCRIPTION - PAIN TYPE
TYPE: ACUTE PAIN

## 2024-12-22 NOTE — CARE PLAN
The patient is Stable - Low risk of patient condition declining or worsening    Shift Goals  Clinical Goals: Pain management, increased mobility    Progress made toward(s) clinical / shift goals:  PRN oxycodone administered for complaints of pain. Patient ambulating in room, to bathroom, and sitting up in chair throughout day.    Patient is not progressing towards the following goals: N/A

## 2024-12-22 NOTE — PROGRESS NOTES
"        Progress Note:    S: Doing better. Still on NC but low. Only walking to bathroom.  Pain controlled  Using IS    O:  Recent Labs     12/20/24  1250 12/21/24  0417 12/22/24  0257   WBC 5.8 11.1* 10.6   RBC 4.26* 3.42* 2.91*   HEMOGLOBIN 14.5 11.4* 9.7*   HEMATOCRIT 41.4* 34.9* 29.8*   MCV 97.2 102.0* 102.4*   MCH 34.0* 33.3* 33.3*   MCHC 35.0 32.7 32.6   RDW 46.2 48.4 48.8   PLATELETCT 173 159* 136*   MPV 9.8 10.0 9.9     Recent Labs     12/20/24  1250 12/21/24  0417   SODIUM 140 138   POTASSIUM 3.5* 4.4   CHLORIDE 103 102   CO2 26 27   GLUCOSE 107* 178*   BUN 16 17   CREATININE 0.73 0.74   CALCIUM 8.7 8.0*         Current Facility-Administered Medications   Medication Dose    buPROPion SR (Wellbutrin-SR) tablet 150 mg  150 mg    lactated ringers infusion      enoxaparin (Lovenox) inj 40 mg  40 mg    Pharmacy Consult Request ...Pain Management Review 1 Each  1 Each    ondansetron (Zofran) syringe/vial injection 4 mg  4 mg    diphenhydrAMINE (Benadryl) injection 25 mg  25 mg    diphenhydrAMINE (Benadryl) tablet/capsule 25 mg  25 mg    Or    diphenhydrAMINE (Benadryl) injection 25 mg  25 mg    benzocaine-menthol (Cepacol) lozenge 1 Lozenge  1 Lozenge    methocarbamol (Robaxin) injection 1,000 mg  1,000 mg    morphine 4 MG/ML injection 2-4 mg  2-4 mg    LORazepam (Ativan) injection 0.5-1 mg  0.5-1 mg    enalaprilat injection 2.5 mg 2 mL  2.5 mg    famotidine (Pepcid) tablet 20 mg  20 mg    Or    famotidine (Pepcid) injection 20 mg  20 mg    hydrALAZINE (Apresoline) injection 10 mg  10 mg    oxyCODONE immediate-release (Roxicodone) tablet 5-10 mg  5-10 mg    Or    oxyCODONE immediate-release (Roxicodone) tablet 10 mg  10 mg    ondansetron (Zofran ODT) dispertab 4 mg  4 mg    ketorolac (Toradol) 15 MG/ML injection 15 mg  15 mg       PE:  /69   Pulse 96   Temp 36.8 °C (98.2 °F) (Temporal)   Resp 18   Ht 1.93 m (6' 4\")   Wt (!) 126 kg (277 lb 9 oz)   SpO2 91%     Intake/Output Summary (Last 24 hours) at " 12/21/2024 0941  Last data filed at 12/21/2024 0857  Gross per 24 hour   Intake 2518.25 ml   Output 1895 ml   Net 623.25 ml     Physical Exam  Pulmonary:      Effort: Pulmonary effort is normal.   Chest:      Chest wall: Tenderness present.   Musculoskeletal:         General: Normal range of motion.      Cervical back: Neck supple.   Neurological:      General: No focal deficit present.      Mental Status: He is alert.           Rads:  DX-CHEST-PORTABLE (1 VIEW)   Final Result         1. No significant change large right pneumothorax with pneumonectomy type surgical clips and chest tube. Slight increase chest wall subcutaneous emphysema.                     DX-CHEST-LIMITED (1 VIEW)   Final Result      1.  Large right-sided pneumothorax with a right chest tube present which is expected after pneumonectomy.      2.  Multiple right rib fractures.      3.  Surgical changes involving the right thorax.      4.  This was discussed with JOHN H GANSER at 8:50 PM on 12/20/2024.          A:   Active Hospital Problems    Diagnosis     Adenocarcinoma, lung (HCC) [C34.90]      Diagnosed September 2022.  Patient underwent chemo and surgery (right middle and lower lobe removed).  Follows up with oncology (Dr. Howe) and gets CT every 3 months.  Nov 2024 PET showed 3.7 cm mass suspicious for recurrent lung carcinoma and additional 0.8 cm RUL nodule suspicious for carcinoma.           P: Doing ok. Needs to mobilize more. DC in 1-2 days    Marybeth Ko M.D.  [facility]

## 2024-12-22 NOTE — CARE PLAN
The patient is Stable - Low risk of patient condition declining or worsening    Shift Goals  Clinical Goals: pain management, pulmonary hygiene, comfort  Patient Goals: pain control, rest  Family Goals: RUCHI    Progress made toward(s) clinical / shift goals: Patients pain medicated per MAR. O2 titrated to 1L NC. Patient independently using IS device. Patient sleeping throughout shift.       Problem: Pain - Standard  Goal: Alleviation of pain or a reduction in pain to the patient’s comfort goal  Description: Target End Date:  Prior to discharge or change in level of care    Document on Vitals flowsheet    1.  Document pain using the appropriate pain scale per order or unit policy  2.  Educate and implement non-pharmacologic comfort measures (i.e. relaxation, distraction, massage, cold/heat therapy, etc.)  3.  Pain management medications as ordered  4.  Reassess pain after pain med administration per policy  5.  If opiods administered assess patient's response to pain medication is appropriate per POSS sedation scale  6.  Follow pain management plan developed in collaboration with patient and interdisciplinary team (including palliative care or pain specialists if applicable)  Outcome: Progressing     Problem: Knowledge Deficit - Standard  Goal: Patient and family/care givers will demonstrate understanding of plan of care, disease process/condition, diagnostic tests and medications  Description: Target End Date:  1-3 days or as soon as patient condition allows    Document in Patient Education    1.  Patient and family/caregiver oriented to unit, equipment, visitation policy and means for communicating concern  2.  Complete/review Learning Assessment  3.  Assess knowledge level of disease process/condition, treatment plan, diagnostic tests and medications  4.  Explain disease process/condition, treatment plan, diagnostic tests and medications  Outcome: Progressing       Patient is not progressing towards the following  goals:

## 2024-12-22 NOTE — PROGRESS NOTES
Report received from RN, assumed care at 1845  Pt is A0X4, and responds appropriately   Pt declines any SOB, chest pain, new onset of numbness/ tingling  Pt rates pain at 5/10, on a scale of 1-10, pt medicated per MAR  Pt is voiding adequately and without hesitancy  Pt has + flatus, - bowel sounds, + BM on 12/20  Pt ambulates with a standby assist   Pt is tolerating a  regular diet, pt denies any nausea/vomiting  Plan of care discussed, all questions answered. Explained importance of calling before getting OOB and pt verbalizes understanding. Explained importance of oral care. Call light is within reach, treaded slipper socks on, bed in lowest/ locked position, hourly rounding in place, all needs met at this time

## 2024-12-22 NOTE — PROGRESS NOTES
Received report from previous shift RN  Assessment complete.  A&O x 4. Patient calls appropriately.  Patient ambulates with x1 assist. Patient has 7/10 pain. Pain managed with prescribed medications.  Denies N&V. Tolerating diet.  Skin per flowsheets.  + void, + flatus, - BM.  Patient denies SOB.  SCD's on.  Patient pleasant and cooperative with plan of care.  Review plan with of care with patient. Call light and personal belongings with in reach. Hourly rounding in place. All needs met at this time.

## 2024-12-23 LAB — PATHOLOGY CONSULT NOTE: NORMAL

## 2024-12-23 PROCEDURE — 770001 HCHG ROOM/CARE - MED/SURG/GYN PRIV*

## 2024-12-23 PROCEDURE — A9270 NON-COVERED ITEM OR SERVICE: HCPCS | Performed by: PHYSICIAN ASSISTANT

## 2024-12-23 PROCEDURE — 700111 HCHG RX REV CODE 636 W/ 250 OVERRIDE (IP): Mod: JZ | Performed by: PHYSICIAN ASSISTANT

## 2024-12-23 PROCEDURE — 700102 HCHG RX REV CODE 250 W/ 637 OVERRIDE(OP): Performed by: PHYSICIAN ASSISTANT

## 2024-12-23 PROCEDURE — RXMED WILLOW AMBULATORY MEDICATION CHARGE: Performed by: SURGERY

## 2024-12-23 RX ORDER — OXYCODONE HYDROCHLORIDE 5 MG/1
5-10 TABLET ORAL EVERY 4 HOURS PRN
Qty: 40 TABLET | Refills: 0 | Status: SHIPPED | OUTPATIENT
Start: 2024-12-23 | End: 2024-12-27

## 2024-12-23 RX ADMIN — OXYCODONE HYDROCHLORIDE 10 MG: 10 TABLET ORAL at 13:37

## 2024-12-23 RX ADMIN — BUPROPION HYDROCHLORIDE 150 MG: 150 TABLET, FILM COATED, EXTENDED RELEASE ORAL at 04:14

## 2024-12-23 RX ADMIN — OXYCODONE HYDROCHLORIDE 10 MG: 10 TABLET ORAL at 08:19

## 2024-12-23 RX ADMIN — BUPROPION HYDROCHLORIDE 150 MG: 150 TABLET, FILM COATED, EXTENDED RELEASE ORAL at 17:48

## 2024-12-23 RX ADMIN — FAMOTIDINE 20 MG: 20 TABLET, FILM COATED ORAL at 04:14

## 2024-12-23 RX ADMIN — FAMOTIDINE 20 MG: 20 TABLET, FILM COATED ORAL at 17:48

## 2024-12-23 RX ADMIN — ENOXAPARIN SODIUM 40 MG: 100 INJECTION SUBCUTANEOUS at 04:14

## 2024-12-23 RX ADMIN — OXYCODONE HYDROCHLORIDE 10 MG: 10 TABLET ORAL at 18:36

## 2024-12-23 RX ADMIN — OXYCODONE 5 MG: 5 TABLET ORAL at 01:59

## 2024-12-23 ASSESSMENT — PAIN DESCRIPTION - PAIN TYPE
TYPE: ACUTE PAIN
TYPE: ACUTE PAIN;SURGICAL PAIN

## 2024-12-23 NOTE — DISCHARGE PLANNING
-1517  DPA informed by [facility] that pt's insurance is out of network.  DPA sent DME oxygen referral to [facility].     -1625  DPA spoke with Silvia from [facility]. Silvia informed DPA that insurance is still pending for oxygen order. Silvia to follow up with ED once she has approval or if there is an issue with insurance.

## 2024-12-23 NOTE — PROGRESS NOTES
Report received from zoltan RN  Pt AAOx4, Alert. Responds appropriately  0.5L   Pain managed per MAR, resting comfortably  +void, +flatus, BM 12/20  Regular diet, tolerating well. No n/v  Ambulating to BR with steady gait  Calls appropriately for assistance  SCDs on    Skin: Right chest/flank island dressing cdi    POC reviewed, education provided. Bed locked and in low position, pt instructed to call for assistance. Comprehension verbalized. Call light within reach, all needs met at this time.

## 2024-12-23 NOTE — DISCHARGE PLANNING
"Case Management Discharge Planning    Admission Date: 12/20/2024  GMLOS: 2.1  ALOS: 3    6-Clicks ADL Score: 21  6-Clicks Mobility Score: 19      Anticipated Discharge Dispo: Discharge Disposition: Discharged to home/self care (01) with close OP follow up     DME Needed: Yes    DME Ordered: Yes    Action(s) Taken: Updated Provider/Nurse on Discharge Plan    Pt was discussed in IDT rounds. Pt needs home oxygen set up .  Pt is agreeable to home oxygen set up.  Pt s DME choices are [facility], [facility] and [facility]. Choice faxed to [facility].    14: 50 Per Kayleigh of [facility] order is being processed, they accept  Pt s insurance and they service [place]. . [facility] declined.    Pt is now pending with [facility] , [facility] is following up .   Per OSWALD notes  \"DPA spoke with Silvia from [facility]. Silvia informed DPA that insurance is still pending for oxygen order. Silvia to follow up with ED once she has approval or if there is an issue with insurance. \"    Informed Charge JAYNE Ellis of this update.        Escalations Completed: None    Medically Clear: Yes    Next Steps:   CM to continue to assist Pt with discharge as needed    Barriers to Discharge:   DME/ pending home oxygen set up/delivery at bedside    Is the patient up for discharge tomorrow: No    "

## 2024-12-23 NOTE — FACE TO FACE
Face to Face Note  -  Durable Medical Equipment    John H Ganser, M.D. - NPI: [id number]  I certify that this patient is under my care and that they had a durable medical equipment(DME)face to face encounter by myself that meets the physician DME face-to-face encounter requirements with this patient on:    Date of encounter:   Patient:                    MRN:                       YOB: 2022  Babatunde Sullivan  [id number]  1965     The encounter with the patient was in whole, or in part, for the following medical condition, which is the primary reason for durable medical equipment:  Other - Lung resection    I certify that, based on my findings, the following durable medical equipment is medically necessary:    Oxygen   HOME O2 Saturation Measurements:(Values must be present for Home Oxygen orders)  Room air sat at rest: 90  Room air sat with amb: 87  With liters of O2: 3, O2 sat at rest with O2: 90  With Liters of O2: 3, O2 sat with amb with O2 : 87  Is the patient mobile?: Yes  If patient feels more short of breath, they can go up to 6 liters per minute and contact healthcare provider.    Supporting Symptoms:  Shortness of breath .    My Clinical findings support the need for the above equipment due to:  Hypoxia

## 2024-12-23 NOTE — CARE PLAN
The patient is Stable - Low risk of patient condition declining or worsening    Shift Goals  Clinical Goals: oxygenation, pain mgmt  Patient Goals: sleep, pain mgmt  Family Goals: RUCHI    Progress made toward(s) clinical / shift goals:  education provided on poc. Safety maintained. Pain well managed    Problem: Pain - Standard  Goal: Alleviation of pain or a reduction in pain to the patient’s comfort goal  Description: Target End Date:  Prior to discharge or change in level of care    Document on Vitals flowsheet    1.  Document pain using the appropriate pain scale per order or unit policy  2.  Educate and implement non-pharmacologic comfort measures (i.e. relaxation, distraction, massage, cold/heat therapy, etc.)  3.  Pain management medications as ordered  4.  Reassess pain after pain med administration per policy  5.  If opiods administered assess patient's response to pain medication is appropriate per POSS sedation scale  6.  Follow pain management plan developed in collaboration with patient and interdisciplinary team (including palliative care or pain specialists if applicable)  Outcome: Progressing

## 2024-12-24 ENCOUNTER — PHARMACY VISIT (OUTPATIENT)
Dept: PHARMACY | Facility: MEDICAL CENTER | Age: 59
End: 2024-12-24
Payer: COMMERCIAL

## 2024-12-24 VITALS
HEIGHT: 76 IN | SYSTOLIC BLOOD PRESSURE: 125 MMHG | RESPIRATION RATE: 18 BRPM | HEART RATE: 87 BPM | TEMPERATURE: 97.9 F | OXYGEN SATURATION: 94 % | BODY MASS INDEX: 33.8 KG/M2 | DIASTOLIC BLOOD PRESSURE: 74 MMHG | WEIGHT: 277.56 LBS

## 2024-12-24 PROCEDURE — A9270 NON-COVERED ITEM OR SERVICE: HCPCS | Performed by: PHYSICIAN ASSISTANT

## 2024-12-24 PROCEDURE — 700111 HCHG RX REV CODE 636 W/ 250 OVERRIDE (IP): Mod: JZ | Performed by: PHYSICIAN ASSISTANT

## 2024-12-24 PROCEDURE — 700102 HCHG RX REV CODE 250 W/ 637 OVERRIDE(OP): Performed by: PHYSICIAN ASSISTANT

## 2024-12-24 RX ADMIN — OXYCODONE HYDROCHLORIDE 10 MG: 10 TABLET ORAL at 07:45

## 2024-12-24 RX ADMIN — FAMOTIDINE 20 MG: 20 TABLET, FILM COATED ORAL at 04:09

## 2024-12-24 RX ADMIN — OXYCODONE HYDROCHLORIDE 10 MG: 10 TABLET ORAL at 00:52

## 2024-12-24 RX ADMIN — OXYCODONE HYDROCHLORIDE 10 MG: 10 TABLET ORAL at 11:41

## 2024-12-24 RX ADMIN — BUPROPION HYDROCHLORIDE 150 MG: 150 TABLET, FILM COATED, EXTENDED RELEASE ORAL at 04:09

## 2024-12-24 RX ADMIN — ENOXAPARIN SODIUM 40 MG: 100 INJECTION SUBCUTANEOUS at 04:09

## 2024-12-24 ASSESSMENT — PAIN DESCRIPTION - PAIN TYPE
TYPE: SURGICAL PAIN
TYPE: SURGICAL PAIN
TYPE: ACUTE PAIN
TYPE: SURGICAL PAIN
TYPE: ACUTE PAIN

## 2024-12-24 NOTE — PROGRESS NOTES
Received report from previous shift RN  Assessment complete.  A&O x 4. Patient calls appropriately.  Patient ambulates with x1 assist. Patient has 7/10 pain. Pain managed with prescribed medications.  Denies N&V. Tolerating diet.  Skin per flowsheets.  + void, + flatus, - BM.  Patient denies SOB.  SCD's on.  Patient pleasant and cooperative with plan of care.  Review plan with of care with patient. Call light and personal belongings with in reach. Hourly rounding in place. All needs met at this time

## 2024-12-24 NOTE — DISCHARGE PLANNING
Silvia with [employer] called stating they have been unable to get authorization from the Insurance company so will be unable to deliver any oxygen tonight. Per Silvia they will try to contact insurance first thing in the morning to obtain authorization. RN updated.

## 2024-12-24 NOTE — CARE PLAN
Problem: Pain - Standard  Goal: Alleviation of pain or a reduction in pain to the patient’s comfort goal  Outcome: Progressing     Problem: Knowledge Deficit - Standard  Goal: Patient and family/care givers will demonstrate understanding of plan of care, disease process/condition, diagnostic tests and medications  Outcome: Progressing   The patient is Stable - Low risk of patient condition declining or worsening    Shift Goals  Clinical Goals: oxygen delivery  Patient Goals: go home  Family Goals: no family present at this time    Progress made toward(s) clinical / shift goals:  Medicated per MAR prn    Patient is not progressing towards the following goals:

## 2024-12-24 NOTE — CARE PLAN
The patient is Stable - Low risk of patient condition declining or worsening    Shift Goals  Clinical Goals: oxygenation, saety  Patient Goals: pain mgmt, rest  Family Goals: RUCHI    Progress made toward(s) clinical / shift goals:    Problem: Pain - Standard  Goal: Alleviation of pain or a reduction in pain to the patient’s comfort goal  Description: Target End Date:  Prior to discharge or change in level of care    Document on Vitals flowsheet    1.  Document pain using the appropriate pain scale per order or unit policy  2.  Educate and implement non-pharmacologic comfort measures (i.e. relaxation, distraction, massage, cold/heat therapy, etc.)  3.  Pain management medications as ordered  4.  Reassess pain after pain med administration per policy  5.  If opiods administered assess patient's response to pain medication is appropriate per POSS sedation scale  6.  Follow pain management plan developed in collaboration with patient and interdisciplinary team (including palliative care or pain specialists if applicable)  Outcome: Progressing

## 2024-12-24 NOTE — PROGRESS NOTES
Virtual Nurse rounding complete.  Rounding Needs: Patient resting comfortably with no needs at this time.  Wanted to inquire about home O2, explained situation to pt and the need to hear back from insurance on authorization. Pt understands and has no other needs or questions.

## 2024-12-24 NOTE — PROGRESS NOTES
"Progress Note:    S: Did not discharge as problem with arranging home O2  Desat to 70s while showering    O:  Recent Labs     12/22/24  0257   WBC 10.6   RBC 2.91*   HEMOGLOBIN 9.7*   HEMATOCRIT 29.8*   .4*   MCH 33.3*   MCHC 32.6   RDW 48.8   PLATELETCT 136*   MPV 9.9             Current Facility-Administered Medications   Medication Dose    buPROPion SR (Wellbutrin-SR) tablet 150 mg  150 mg    enoxaparin (Lovenox) inj 40 mg  40 mg    Pharmacy Consult Request ...Pain Management Review 1 Each  1 Each    ondansetron (Zofran) syringe/vial injection 4 mg  4 mg    diphenhydrAMINE (Benadryl) injection 25 mg  25 mg    diphenhydrAMINE (Benadryl) tablet/capsule 25 mg  25 mg    Or    diphenhydrAMINE (Benadryl) injection 25 mg  25 mg    benzocaine-menthol (Cepacol) lozenge 1 Lozenge  1 Lozenge    morphine 4 MG/ML injection 2-4 mg  2-4 mg    LORazepam (Ativan) injection 0.5-1 mg  0.5-1 mg    enalaprilat injection 2.5 mg 2 mL  2.5 mg    famotidine (Pepcid) tablet 20 mg  20 mg    Or    famotidine (Pepcid) injection 20 mg  20 mg    hydrALAZINE (Apresoline) injection 10 mg  10 mg    oxyCODONE immediate-release (Roxicodone) tablet 5-10 mg  5-10 mg    Or    oxyCODONE immediate-release (Roxicodone) tablet 10 mg  10 mg    ondansetron (Zofran ODT) dispertab 4 mg  4 mg       PE:  /74   Pulse 87   Temp 36.6 °C (97.9 °F) (Temporal)   Resp 18   Ht 1.93 m (6' 4\")   Wt (!) 126 kg (277 lb 9 oz)   SpO2 94%     Intake/Output Summary (Last 24 hours) at 12/24/2024 1043  Last data filed at 12/23/2024 2008  Gross per 24 hour   Intake 125 ml   Output --   Net 125 ml       Wounds dry    Rads:  DX-CHEST-PORTABLE (1 VIEW)   Final Result         1. No significant change large right pneumothorax with pneumonectomy type surgical clips and chest tube. Slight increase chest wall subcutaneous emphysema.                     DX-CHEST-LIMITED (1 VIEW)   Final Result      1.  Large right-sided pneumothorax with a right chest tube present which " is expected after pneumonectomy.      2.  Multiple right rib fractures.      3.  Surgical changes involving the right thorax.      4.  This was discussed with JOHN H GANSER at 8:50 PM on 12/20/2024.          A:   Active Hospital Problems    Diagnosis     Adenocarcinoma, lung (HCC) [C34.90]      Diagnosed September 2022.  Patient underwent chemo and surgery (right middle and lower lobe removed).  Follows up with oncology (Dr. Howe) and gets CT every 3 months.  Nov 2024 PET showed 3.7 cm mass suspicious for recurrent lung carcinoma and additional 0.8 cm RUL nodule suspicious for carcinoma.           P: Home when O2 arranged  Await path    John Ganser M.D.  [facility]

## 2024-12-24 NOTE — PROGRESS NOTES
"Assumed care of patient from night shift RN at 0700.  Patient is alert and oriented times 4, states pain of 8/10, medicated per MAR.  VSS /74   Pulse 87   Temp 36.6 °C (97.9 °F) (Temporal)   Resp 18   Ht 1.93 m (6' 4\")   Wt (!) 126 kg (277 lb 9 oz)   SpO2 94%   BMI 33.79 kg/m²   PIV in the RFA, patent and saline locked.  On 1L baseline, reported patient requires 4L with ambulation.  Last BM 12/20 per patient, stool softeners in use  Regular diet, tolerating well.  Urinating without difficulty  Incision to the R chest, well approximated with staples and CHLOE  Old chest tube site to the R, dressing CDI  Patient is up self, demonstrates steady gait, no assistance needed.  POC discussed for the day, patient hopeful to discharge home if home O2 is delivered.  Bed is locked and in the lowest position, call light is within reach.  All needs are met at this time, hourly rounding is in place.  "

## 2024-12-24 NOTE — DISCHARGE PLANNING
-0815  DPA informed by Silvia with [employer] [employer] that company was unable to contact pt's insurance yesterday. Company sat on hold for 45 minutes and was unable to verify benefits. Silvia will have someone attempt to contact insurance again today and follow up with DPA regarding insurance confirmation.     -1049  DPA informed by Silvia with [facility] that insurance has approved oxygen. Silvia to follow up with DPA once delivery ETA is known.     -1055  DPA informed by Silvia with [facility] that oxygen will be delivered to bedside by 1200.     -1325  DPA followed up on oxygen delivery. Per Silvia with [facility],  was having difficulty finding parking, equipment is being delivered now.

## 2024-12-24 NOTE — DISCHARGE INSTRUCTIONS
1/13/2025  9:30 AM Domenic Gil M.D. [facility] None   5/29/2025  9:40 AM Mariya Mills M.D. [facility]

## 2024-12-24 NOTE — PROGRESS NOTES
Report received from zoltan RN  Family at bedside  Pt AAOx4, Alert. Responds appropriately  1L   Pain managed per MAR, resting comfortably  +void, +flatus, BM 12/20  Regular diet, tolerating well. No n/v  Ambulating to BR with steady gait  Calls appropriately for assistance  SCDs on    Skin: Right chest/flank island dressing cdi    POC reviewed, education provided. Bed locked and in low position, pt instructed to call for assistance. Comprehension verbalized. Call light within reach, all needs met at this time.

## 2025-01-13 ENCOUNTER — OFFICE VISIT (OUTPATIENT)
Dept: UROLOGY | Facility: MEDICAL CENTER | Age: 60
End: 2025-01-13
Payer: COMMERCIAL

## 2025-01-13 DIAGNOSIS — N52.9 ERECTILE DYSFUNCTION, UNSPECIFIED ERECTILE DYSFUNCTION TYPE: ICD-10-CM

## 2025-01-13 DIAGNOSIS — E29.1 PRIMARY HYPOGONADISM IN MALE: ICD-10-CM

## 2025-01-13 DIAGNOSIS — Z85.47 HISTORY OF TESTICULAR CANCER: Chronic | ICD-10-CM

## 2025-01-13 PROCEDURE — 99203 OFFICE O/P NEW LOW 30 MIN: CPT | Performed by: UROLOGY

## 2025-01-13 RX ORDER — TADALAFIL 20 MG/1
20 TABLET ORAL PRN
Qty: 30 TABLET | Refills: 3 | Status: SHIPPED | OUTPATIENT
Start: 2025-01-13

## 2025-01-13 RX ORDER — TESTOSTERONE 20.25 MG/1.25G
20.25 GEL TOPICAL DAILY
Qty: 1 G | Refills: 3 | Status: SHIPPED | OUTPATIENT
Start: 2025-01-13 | End: 2025-07-30

## 2025-01-13 NOTE — PROGRESS NOTES
Chief Complaint: hypogonadism, ED    HPI: Babatunde Sullivan is a 59 y.o. male with a history of testicular cancer status post left orchiectomy and RPLND at age 18, and recurrent lung cancer most recently status post completion right pneumonectomy last month.     He is referred for ongoing difficulty with erectile function, as well as lost libido, decreased energy and mood, which has been a problem since at least the time of his chemotherapy for lung cancer two years ago.     He has not been treated for the ED. He notes rare nocturnal erections with partial rigidity, but when aroused/stimulated he is able to achieve only a 5/10 erectile rigidity which has not been sufficient for penetrative intercourse, and he is not able to maintain it.     His total serum testosterone was low on 11/25/24, and he is certainly at risk for primary hypogonadism given his prior orchiectomy and more recent chemotherapy.       Past Medical History:  Past Medical History:   Diagnosis Date    Allergy     Anesthesia     PONV    Arthritis     osteo-lower back    Back pain     Breath shortness     Bronchitis 03/2021    Bronchitis approx once a year    Cancer (HCC) 1984    testicular    Cancer ([facility]) 09/2022    Lung CA    Carcinoma in situ of respiratory system     Chickenpox     Cough     Heart burn     History of blood transfusion 1984    Influenza     H/O    Obesity     PONV (postoperative nausea and vomiting)     Ringing in ears     Snoring     Tonsillitis     H/O    Wears glasses     Wheezing        Past Surgical History:  Past Surgical History:   Procedure Laterality Date    PA REMOVE THOR LYMPH NODES RAD REGNL Right 12/20/2024    Procedure: LYMPHADENECTOMY;  Surgeon: John H Ganser, M.D.;  Location: SURGERY [facility];  Service: General    THORACOTOMY Right 12/20/2024    Procedure: RIGHT THORACOTOMY, COMPLETION PNEUMONECTOMY, NODE DISSECTION;  Surgeon: John H Ganser, M.D.;  Location: SURGERY [facility];  Service: General    PA  BRONCHOSCOPY,DIAGNOSTIC Right 2023    Procedure: FIBER OPTIC BRONCHOSCOPY WITH  BRUSH, BRONCHOALVEOLAR LAVAGE, BIOPSY AND FINE NEEDLE ASPIRATION,  NAVIGATION,  ROBOTICS;  Surgeon: Genevieve Calderon M.D.;  Location: [facility];  Service: Pulmonary Robotic    THORACOTOMY Right 2023    Procedure: RIGHT THORACOTOMY, MIDDLE AND LOWER LOBECTOMY, NODE DISSECTION;  Surgeon: John H Ganser, M.D.;  Location: [facility];  Service: General    NODE DISSECTION Right 2023    Procedure: LYMPHADENECTOMY;  Surgeon: John H Ganser, M.D.;  Location: [facility];  Service: General    KS BRONCHOSCOPY,DIAGNOSTIC N/A 2022    Procedure: FIBER OPTIC BRONCHOSCOPY WITH  BRUSH, BIOPSY, FINE NEEDLE ASPIRATION AND ENDOBRONCHIAL ULTRASOUND;  Surgeon: Adam Rahman M.D.;  Location: [facility];  Service: Pulmonary    ENDOBRONCHIAL US ADD-ON  2022    Procedure: ENDOBRONCHIAL ULTRASOUND (EBUS);  Surgeon: Adam Rahman M.D.;  Location: [facility];  Service: Pulmonary    ARTHROSCOPY, KNEE Right 2006    acl    JOCELINE BY LAPAROSCOPY      scar tissue removed 2 years later ()    OTHER Left     Embrionic carcinoma, testicle removed    WEDGE RESECTION LUNG Right     toracotomy with wedge resection    MASS EXCISION GENERAL Left 1984    Mass removed left kidney    TONSILLECTOMY  1972    HYDROCELECTOMY ADULT Right     OTHER ABDOMINAL SURGERY  1984    Lymphadenectomy and tumor removal    OTHER ORTHOPEDIC SURGERY  2006    Right ACL replacement       Family History:  Family History   Problem Relation Age of Onset    Heart Disease Mother         MI age 78    Lung Disease Mother         TB    Cancer Father             Hypertension Father     Stroke Father     Diabetes Father             Diabetes Maternal Aunt     Diabetes Maternal Uncle     Cancer Paternal Uncle         skin    Diabetes Maternal Grandmother     Stroke Maternal Grandmother              Heart Disease Paternal Grandmother         MI after giving birth    Heart Disease Paternal Grandfather         MI    Ovarian Cancer Neg Hx     Tubal Cancer Neg Hx     Peritoneal Cancer Neg Hx     Colorectal Cancer Neg Hx     Breast Cancer Neg Hx        Social History:  Social History     Socioeconomic History    Marital status:      Spouse name: Not on file    Number of children: Not on file    Years of education: Not on file    Highest education level: Master's degree (e.g., MA, MS, Melissa, MEd, MSW, ENIO)   Occupational History    Occupation: leyla     Employer: [employer]   Tobacco Use    Smoking status: Never     Passive exposure: Past    Smokeless tobacco: Never    Tobacco comments:     Both of my parents smoked cigarettes   Vaping Use    Vaping status: Never Used   Substance and Sexual Activity    Alcohol use: Not Currently     Comment: Have been on the [employer] for 30+ years    Drug use: Not Currently     Types: Oral     Comment: No drugs done in over 30 years    Sexual activity: Not Currently     Partners: Female     Birth control/protection: Other-See Comments     Comment: No need. Abdominal lymphadenectomy took care of that.   Other Topics Concern    Not on file   Social History Narrative    1 adopted child.     Social Drivers of Health     Financial Resource Strain: Low Risk  (2024)    Overall Financial Resource Strain (CARDIA)     Difficulty of Paying Living Expenses: Not hard at all   Food Insecurity: No Food Insecurity (2024)    Hunger Vital Sign     Worried About Running Out of Food in the Last Year: Never true     Ran Out of Food in the Last Year: Never true   Transportation Needs: No Transportation Needs (2024)    PRAPARE - Transportation     Lack of Transportation (Medical): No     Lack of Transportation (Non-Medical): No   Physical Activity: Insufficiently Active (2024)    Exercise Vital Sign     Days of Exercise per Week: 5 days     Minutes of Exercise per  Session: 10 min   Stress: No Stress Concern Present (12/20/2024)    [employer] [place] [employer] - Occupational Stress Questionnaire     Feeling of Stress : Not at all   Social Connections: Moderately Integrated (12/20/2024)    Social Connection and Isolation Panel [NHANES]     Frequency of Communication with Friends and Family: Never     Frequency of Social Gatherings with Friends and Family: Never     Attends Zoroastrianism Services: More than 4 times per year     Active Member of Clubs or Organizations: Yes     Attends Club or Organization Meetings: More than 4 times per year     Marital Status:    Intimate Partner Violence: Not At Risk (12/20/2024)    Humiliation, Afraid, Rape, and Kick questionnaire     Fear of Current or Ex-Partner: No     Emotionally Abused: No     Physically Abused: No     Sexually Abused: No   Housing Stability: Low Risk  (12/20/2024)    Housing Stability Vital Sign     Unable to Pay for Housing in the Last Year: No     Number of Times Moved in the Last Year: 0     Homeless in the Last Year: No       Medications:  Current Outpatient Medications   Medication Sig Dispense Refill    tadalafil 20 MG tablet Take 1 Tablet by mouth as needed for Erectile Dysfunction. Take one tablet at least one hour prior to sexual activity. Maximum one per day. 30 Tablet 3    Testosterone 20.25 MG/1.25GM (1.62%) Gel Place 20.25 mg on the skin every day for 198 days. 1 g 3    Saw Palmetto 500 MG Cap Take 540 mg by mouth every day. (Takes 6 capsules daily)      buPROPion (WELLBUTRIN XL) 300 MG XL tablet Take 1 Tablet by mouth every morning. 90 Tablet 3    hydroCHLOROthiazide 50 MG Tab Take 1 Tablet by mouth every day. 90 Tablet 3    furosemide (LASIX) 20 MG Tab Take 1 Tablet by mouth 1 time a day as needed (leg swelling). (Patient not taking: Reported on 12/20/2024) 30 Tablet 2    acetaminophen (TYLENOL) 500 MG Tab Take 500-1,000 mg by mouth every 6 hours as needed for Mild Pain.      Zinc Citrate  "(ZINC EXTRA STRENGTH PO) Take 50 mg by mouth every day.      Multiple Vitamin (MULTIVITAMINS PO) Take 1 Tablet by mouth every day.      PSYLLIUM PO Take 5 Capsules by mouth 1 time a day as needed.       No current facility-administered medications for this visit.       Allergies:  Allergies   Allergen Reactions    Mefoxin Swelling     Swelling everywhere       Review of Systems:  Constitutional: Negative for fever, chills and malaise/fatigue.   HENT: Negative for congestion.    Eyes: Negative for pain.   Respiratory: Negative for cough and shortness of breath.    Cardiovascular: Negative for leg swelling.   Gastrointestinal: Negative for nausea, vomiting, abdominal pain and diarrhea.   Genitourinary: Negative for dysuria and hematuria.   Skin: Negative for rash.   Neurological: Negative for dizziness, focal weakness and headaches.   Endo/Heme/Allergies: Does not bruise/bleed easily.   Psychiatric/Behavioral: Negative for depression.  The patient is not nervous/anxious.        Physical Exam:  There were no vitals filed for this visit.    GENERAL: well appearing, well nourished, NAD  RESP: respiratory effort normal  ABDOMEN: soft, nontender, nondistended, no masses or organomegaly. Well healed midline laparotomy scar.   HERNIAS: no hernias found on exam  SKIN/LYMPH: normal coloration and turgor, no suspicious skin lesions noted  NEURO/PSYCH: alert, oriented, normal speech, no focal findings or movement disorder noted  EXTREMITIES: no pedal edema noted  GENITOURINARY: penis is normal and solitary right testis is normal in size but soft in texture; no masses or nodules palpable  RECTAL: Exam Deferred      Data Review:    Labs:  POCT UA No results found for: \"POCCOLOR\", \"POCAPPEAR\", \"POCLEUKEST\", \"POCNITRITE\", \"POCUROBILIGE\", \"POCPROTEIN\", \"POCURPH\", \"POCBLOOD\", \"POCSPGRV\", \"POCKETONES\", \"POCBILIRUBIN\", \"POCGLUCUA\"   CBC   Lab Results   Component Value Date/Time    WBC 10.6 12/22/2024 0257    RBC 2.91 (L) 12/22/2024 0257 "    HEMOGLOBIN 9.7 (L) 12/22/2024 0257    HEMATOCRIT 29.8 (L) 12/22/2024 0257    .4 (H) 12/22/2024 0257    MCH 33.3 (H) 12/22/2024 0257    MCHC 32.6 12/22/2024 0257    RDW 48.8 12/22/2024 0257    MPV 9.9 12/22/2024 0257    LYMPHOCYTES 22.90 06/03/2023 0728    LYMPHS 1.29 06/03/2023 0728    MONOCYTES 10.60 06/03/2023 0728    MONOS 0.60 06/03/2023 0728    EOSINOPHILS 3.50 06/03/2023 0728    EOS 0.20 06/03/2023 0728    BASOPHILS 0.90 06/03/2023 0728    BASO 0.05 06/03/2023 0728    NRBC 0.00 06/03/2023 0728       CMP   Lab Results   Component Value Date/Time    SODIUM 138 12/21/2024 0417    POTASSIUM 4.4 12/21/2024 0417    CHLORIDE 102 12/21/2024 0417    CO2 27 12/21/2024 0417    ANION 9.0 12/21/2024 0417    GLUCOSE 178 (H) 12/21/2024 0417    BUN 17 12/21/2024 0417    CREATININE 0.74 12/21/2024 0417    GFRCKD 106 12/21/2024 0417    CALCIUM 8.0 (L) 12/21/2024 0417    CORRCALC 8.6 12/20/2024 1250    ASTSGOT 18 12/20/2024 1250    ALTSGPT 19 12/20/2024 1250    ALKPHOSPHAT 70 12/20/2024 1250    TBILIRUBIN 0.5 12/20/2024 1250    ALBUMIN 4.1 12/20/2024 1250    TOTPROTEIN 7.1 12/20/2024 1250    GLOBULIN 3.0 12/20/2024 1250    AGRATIO 1.4 12/20/2024 1250     INFERTILITY   Lab Results   Component Value Date/Time    TESTOSTERONE 251 (L) 11/25/2024 0909    FREETESTOST 38 (L) 11/25/2024 0909    SEXHORM 44 11/25/2024 0909     PSA   Lab Results   Component Value Date/Time    PSATOTAL 0.18 06/19/2024 1117       Assessment: 59 y.o. male with a history of testicular cancer (now DYLAN) treated with left radical orchiectomy and RPLND at age 18, and recurrent lung cancer treated previously with chemotherapy and now completion right pneumonectomy in December 2024, referred for erectile dysfunction and symptoms of hypogonadism including loss of libido, decreased energy and mood.     I discussed with the patient the diagnosis of erectile dysfunction and how it may relate to his overall health. I counseled the patient that ED can be a  marker of underlying cardiovascular disease and encouraged close follow up with his primary care physician. Likewise, I explained that lifestyle modifications including improved diet and increased exercise can improve not only his overall health but also potentially his erectile function.    We also discussed management options beyond lifestyle modification. I explained the use of a vacuum erection device (JENNIFER) including its appropriate use. We discussed the use of oral phosphodiesterase inhibitors, including sildenafil, tadalafil, and vardenafil. I explained how to appropriately take these medications for maximum effect, as well as potential side effects. Sildenafil should be taken on an empty stomach about an hour before sexual activity, and side effects may include headache, GI upset, vision changes including a blue-tinge, and facial flushing. Tadalafil may be taken up to 24 hours prior to sexual activity but has its peak effect within the first few hours and should be taken about 30 minutes prior to sexual activity; it can be taken with or without food. Side effects can also include headache and indigestion, as well as back pain and myalgias. Vardenafil is a short acting PDE5 inhibitor and should be taken 30-60 minutes prior to expected intercourse. Side effects may include headache, flushing, visual disturbances, and dyspepsia.     We also discussed more invasive methods of treatment, including intraurethral alprostadil and intracavernosal injection therapy (ICI). I explained to the patient that if he would like to consider either of these options, we would need to arrange for an in-office test dose. With both medications we discussed the possible side effects of penile pain and priapism.     Finally, we discussed surgical therapy with implantation of a penile prosthesis. We discussed both malleable and inflatable penile prosthesis, including the nature of the surgery, expected outcomes, and the risks of  surgery including infection, erosion, urethral injury, and device malfunction.       Plan:    -Trial of tadalafil, 20 mg taken at least 1 hour prior to sexual activity as needed  -Start testosterone replacement therapy; patient prefers to trial topical testosterone gel. Rx sent for Androgel, 20.25 mg daily  -Repeat labs (T, CBC) 8-10 weeks after starting therapy  -Follow up in 3 months      Domenic Gil MD

## 2025-01-28 ENCOUNTER — HOSPITAL ENCOUNTER (OUTPATIENT)
Dept: LAB | Facility: MEDICAL CENTER | Age: 60
End: 2025-01-28
Payer: COMMERCIAL

## 2025-01-28 LAB
BASOPHILS # BLD AUTO: 0.6 % (ref 0–1.8)
BASOPHILS # BLD: 0.05 K/UL (ref 0–0.12)
CRP SERPL HS-MCNC: 5.61 MG/DL (ref 0–0.75)
EOSINOPHIL # BLD AUTO: 0.1 K/UL (ref 0–0.51)
EOSINOPHIL NFR BLD: 1.1 % (ref 0–6.9)
ERYTHROCYTE [DISTWIDTH] IN BLOOD BY AUTOMATED COUNT: 50.3 FL (ref 35.9–50)
ERYTHROCYTE [SEDIMENTATION RATE] IN BLOOD BY WESTERGREN METHOD: 40 MM/HOUR (ref 0–20)
HCT VFR BLD AUTO: 41.3 % (ref 42–52)
HGB BLD-MCNC: 13.1 G/DL (ref 14–18)
IMM GRANULOCYTES # BLD AUTO: 0.06 K/UL (ref 0–0.11)
IMM GRANULOCYTES NFR BLD AUTO: 0.7 % (ref 0–0.9)
LYMPHOCYTES # BLD AUTO: 1.12 K/UL (ref 1–4.8)
LYMPHOCYTES NFR BLD: 12.8 % (ref 22–41)
MCH RBC QN AUTO: 31.9 PG (ref 27–33)
MCHC RBC AUTO-ENTMCNC: 31.7 G/DL (ref 32.3–36.5)
MCV RBC AUTO: 100.5 FL (ref 81.4–97.8)
MONOCYTES # BLD AUTO: 0.7 K/UL (ref 0–0.85)
MONOCYTES NFR BLD AUTO: 8 % (ref 0–13.4)
NEUTROPHILS # BLD AUTO: 6.73 K/UL (ref 1.82–7.42)
NEUTROPHILS NFR BLD: 76.8 % (ref 44–72)
NRBC # BLD AUTO: 0 K/UL
NRBC BLD-RTO: 0 /100 WBC (ref 0–0.2)
PLATELET # BLD AUTO: 228 K/UL (ref 164–446)
PMV BLD AUTO: 10.5 FL (ref 9–12.9)
RBC # BLD AUTO: 4.11 M/UL (ref 4.7–6.1)
TSH SERPL-ACNC: 1.27 UIU/ML (ref 0.35–5.5)
WBC # BLD AUTO: 8.8 K/UL (ref 4.8–10.8)

## 2025-01-28 PROCEDURE — 36415 COLL VENOUS BLD VENIPUNCTURE: CPT

## 2025-01-28 PROCEDURE — 85025 COMPLETE CBC W/AUTO DIFF WBC: CPT

## 2025-01-28 PROCEDURE — 85652 RBC SED RATE AUTOMATED: CPT

## 2025-01-28 PROCEDURE — 86140 C-REACTIVE PROTEIN: CPT

## 2025-01-28 PROCEDURE — 86231 EMA EACH IG CLASS: CPT

## 2025-01-28 PROCEDURE — 86364 TISS TRNSGLTMNASE EA IG CLAS: CPT | Mod: 91

## 2025-01-28 PROCEDURE — 82784 ASSAY IGA/IGD/IGG/IGM EACH: CPT

## 2025-01-28 PROCEDURE — 86258 DGP ANTIBODY EACH IG CLASS: CPT

## 2025-01-28 PROCEDURE — 84443 ASSAY THYROID STIM HORMONE: CPT

## 2025-01-30 LAB
GLIADIN IGA SER IA-ACNC: <0.72 FLU (ref 0–4.99)
GLIADIN IGG SER IA-ACNC: 2.35 FLU (ref 0–4.99)
IGA SERPL-MCNC: 217 MG/DL (ref 68–408)
TTG IGA SER IA-ACNC: <1.02 FLU (ref 0–4.99)
TTG IGG SER IA-ACNC: <0.82 FLU (ref 0–4.99)

## 2025-01-31 LAB — ENDOMYSIUM IGA TITR SER IF: NORMAL {TITER}

## 2025-03-06 ENCOUNTER — PATIENT MESSAGE (OUTPATIENT)
Dept: MEDICAL GROUP | Facility: PHYSICIAN GROUP | Age: 60
End: 2025-03-06
Payer: COMMERCIAL

## 2025-03-06 DIAGNOSIS — F33.0 MILD EPISODE OF RECURRENT MAJOR DEPRESSIVE DISORDER (HCC): Chronic | ICD-10-CM

## 2025-03-09 RX ORDER — BUPROPION HYDROCHLORIDE 150 MG/1
150 TABLET ORAL EVERY MORNING
Qty: 90 TABLET | Refills: 1 | Status: SHIPPED | OUTPATIENT
Start: 2025-03-09

## 2025-03-12 ENCOUNTER — PHARMACY VISIT (OUTPATIENT)
Dept: PHARMACY | Facility: MEDICAL CENTER | Age: 60
End: 2025-03-12
Payer: COMMERCIAL

## 2025-03-12 ENCOUNTER — HOSPITAL ENCOUNTER (EMERGENCY)
Facility: MEDICAL CENTER | Age: 60
End: 2025-03-12
Attending: EMERGENCY MEDICINE
Payer: COMMERCIAL

## 2025-03-12 ENCOUNTER — APPOINTMENT (OUTPATIENT)
Dept: RADIOLOGY | Facility: MEDICAL CENTER | Age: 60
End: 2025-03-12
Attending: EMERGENCY MEDICINE
Payer: COMMERCIAL

## 2025-03-12 VITALS
TEMPERATURE: 97 F | DIASTOLIC BLOOD PRESSURE: 78 MMHG | RESPIRATION RATE: 17 BRPM | SYSTOLIC BLOOD PRESSURE: 135 MMHG | HEART RATE: 91 BPM | OXYGEN SATURATION: 95 %

## 2025-03-12 DIAGNOSIS — R10.9 FLANK PAIN: ICD-10-CM

## 2025-03-12 LAB
ALBUMIN SERPL BCP-MCNC: 3.6 G/DL (ref 3.2–4.9)
ALBUMIN/GLOB SERPL: 0.9 G/DL
ALP SERPL-CCNC: 86 U/L (ref 30–99)
ALT SERPL-CCNC: 10 U/L (ref 2–50)
ANION GAP SERPL CALC-SCNC: 12 MMOL/L (ref 7–16)
APPEARANCE UR: CLEAR
AST SERPL-CCNC: 23 U/L (ref 12–45)
BASOPHILS # BLD AUTO: 0.3 % (ref 0–1.8)
BASOPHILS # BLD: 0.04 K/UL (ref 0–0.12)
BILIRUB SERPL-MCNC: 0.5 MG/DL (ref 0.1–1.5)
BILIRUB UR QL STRIP.AUTO: NEGATIVE
BUN SERPL-MCNC: 21 MG/DL (ref 8–22)
CALCIUM ALBUM COR SERPL-MCNC: 9.9 MG/DL (ref 8.5–10.5)
CALCIUM SERPL-MCNC: 9.6 MG/DL (ref 8.5–10.5)
CHLORIDE SERPL-SCNC: 98 MMOL/L (ref 96–112)
CO2 SERPL-SCNC: 25 MMOL/L (ref 20–33)
COLOR UR: YELLOW
CREAT SERPL-MCNC: 1.21 MG/DL (ref 0.5–1.4)
EOSINOPHIL # BLD AUTO: 0.03 K/UL (ref 0–0.51)
EOSINOPHIL NFR BLD: 0.3 % (ref 0–6.9)
ERYTHROCYTE [DISTWIDTH] IN BLOOD BY AUTOMATED COUNT: 48 FL (ref 35.9–50)
GFR SERPLBLD CREATININE-BSD FMLA CKD-EPI: 70 ML/MIN/1.73 M 2
GLOBULIN SER CALC-MCNC: 4.2 G/DL (ref 1.9–3.5)
GLUCOSE SERPL-MCNC: 140 MG/DL (ref 65–99)
GLUCOSE UR STRIP.AUTO-MCNC: NEGATIVE MG/DL
HCT VFR BLD AUTO: 38.4 % (ref 42–52)
HGB BLD-MCNC: 12.6 G/DL (ref 14–18)
IMM GRANULOCYTES # BLD AUTO: 0.06 K/UL (ref 0–0.11)
IMM GRANULOCYTES NFR BLD AUTO: 0.5 % (ref 0–0.9)
KETONES UR STRIP.AUTO-MCNC: NEGATIVE MG/DL
LEUKOCYTE ESTERASE UR QL STRIP.AUTO: NEGATIVE
LIPASE SERPL-CCNC: 32 U/L (ref 11–82)
LYMPHOCYTES # BLD AUTO: 0.78 K/UL (ref 1–4.8)
LYMPHOCYTES NFR BLD: 6.7 % (ref 22–41)
MCH RBC QN AUTO: 29.2 PG (ref 27–33)
MCHC RBC AUTO-ENTMCNC: 32.8 G/DL (ref 32.3–36.5)
MCV RBC AUTO: 89.1 FL (ref 81.4–97.8)
MICRO URNS: NORMAL
MONOCYTES # BLD AUTO: 0.89 K/UL (ref 0–0.85)
MONOCYTES NFR BLD AUTO: 7.6 % (ref 0–13.4)
NEUTROPHILS # BLD AUTO: 9.92 K/UL (ref 1.82–7.42)
NEUTROPHILS NFR BLD: 84.6 % (ref 44–72)
NITRITE UR QL STRIP.AUTO: NEGATIVE
NRBC # BLD AUTO: 0 K/UL
NRBC BLD-RTO: 0 /100 WBC (ref 0–0.2)
PH UR STRIP.AUTO: 5.5 [PH] (ref 5–8)
PLATELET # BLD AUTO: 257 K/UL (ref 164–446)
PMV BLD AUTO: 9.7 FL (ref 9–12.9)
POTASSIUM SERPL-SCNC: 3.4 MMOL/L (ref 3.6–5.5)
PROT SERPL-MCNC: 7.8 G/DL (ref 6–8.2)
PROT UR QL STRIP: NEGATIVE MG/DL
RBC # BLD AUTO: 4.31 M/UL (ref 4.7–6.1)
RBC UR QL AUTO: NEGATIVE
SODIUM SERPL-SCNC: 135 MMOL/L (ref 135–145)
SP GR UR STRIP.AUTO: 1.03
UROBILINOGEN UR STRIP.AUTO-MCNC: 1 EU/DL
WBC # BLD AUTO: 11.7 K/UL (ref 4.8–10.8)

## 2025-03-12 PROCEDURE — RXMED WILLOW AMBULATORY MEDICATION CHARGE: Performed by: EMERGENCY MEDICINE

## 2025-03-12 PROCEDURE — 81003 URINALYSIS AUTO W/O SCOPE: CPT

## 2025-03-12 PROCEDURE — 74176 CT ABD & PELVIS W/O CONTRAST: CPT

## 2025-03-12 PROCEDURE — 96372 THER/PROPH/DIAG INJ SC/IM: CPT

## 2025-03-12 PROCEDURE — 85025 COMPLETE CBC W/AUTO DIFF WBC: CPT

## 2025-03-12 PROCEDURE — 80053 COMPREHEN METABOLIC PANEL: CPT

## 2025-03-12 PROCEDURE — 99284 EMERGENCY DEPT VISIT MOD MDM: CPT

## 2025-03-12 PROCEDURE — 36415 COLL VENOUS BLD VENIPUNCTURE: CPT

## 2025-03-12 PROCEDURE — 83690 ASSAY OF LIPASE: CPT

## 2025-03-12 PROCEDURE — 700111 HCHG RX REV CODE 636 W/ 250 OVERRIDE (IP): Performed by: EMERGENCY MEDICINE

## 2025-03-12 RX ORDER — KETOROLAC TROMETHAMINE 10 MG/1
10 TABLET, FILM COATED ORAL EVERY 6 HOURS PRN
Qty: 20 TABLET | Refills: 0 | Status: SHIPPED | OUTPATIENT
Start: 2025-03-12 | End: 2025-03-17

## 2025-03-12 RX ORDER — ONDANSETRON 4 MG/1
4 TABLET, ORALLY DISINTEGRATING ORAL EVERY 6 HOURS PRN
Qty: 10 TABLET | Refills: 0 | Status: SHIPPED | OUTPATIENT
Start: 2025-03-12

## 2025-03-12 RX ORDER — KETOROLAC TROMETHAMINE 15 MG/ML
15 INJECTION, SOLUTION INTRAMUSCULAR; INTRAVENOUS ONCE
Status: COMPLETED | OUTPATIENT
Start: 2025-03-12 | End: 2025-03-12

## 2025-03-12 RX ADMIN — KETOROLAC TROMETHAMINE 15 MG: 15 INJECTION, SOLUTION INTRAMUSCULAR; INTRAVENOUS at 08:49

## 2025-03-12 ASSESSMENT — PAIN DESCRIPTION - PAIN TYPE
TYPE: ACUTE PAIN
TYPE: ACUTE PAIN

## 2025-03-12 NOTE — ED PROVIDER NOTES
ED Provider Note    CHIEF COMPLAINT  Chief Complaint   Patient presents with    Flank Pain     Right since last night.        EXTERNAL RECORDS REVIEWED  Outpatient Notes note from outpatient urology, patient with history of metastatic testicular cancer status post orchiectomy and history of lung cancer status post right pneumonectomy    HPI/ROS  LIMITATION TO HISTORY   Select: : None      Babatunde Sullivan is a 59 y.o. male who presents with chief complaint of right-sided flank pain.  Patient reports symptoms began yesterday, this was in the middle of the day.  He reports he had about 30 minutes of right flank pain that radiated into his abdomen.  This lasted 30 minutes and then resolved without intervention.  He reports he felt very nauseous during this time.  Patient denies any associated dysuria urgency frequency or hematuria.  He is status post cholecystectomy years ago.  Patient denies any associated chest pain or shortness of breath.  Patient states that as the symptoms resolved without intervention and he is able to go about his day without any ongoing pain he thought nothing of it, but then again this morning symptoms recurred.  Again these lasted for around 30 minutes and resolved shortly prior to arrival.  Patient denies any associated change in his bowel movements.  He denies any black or bloody stool.  Pain has no association with exertion    PAST MEDICAL HISTORY   has a past medical history of Allergy, Anesthesia, Arthritis, Back pain, Breath shortness, Bronchitis (03/2021), Cancer ([facility]) (1984), Cancer (HCC) (09/2022), Carcinoma in situ of respiratory system, Chickenpox, Cough, Heart burn, History of blood transfusion (1984), Influenza, Obesity, PONV (postoperative nausea and vomiting), Ringing in ears, Snoring, Tonsillitis, Wears glasses, and Wheezing.    SURGICAL HISTORY   has a past surgical history that includes tonsillectomy (1972); arthroscopy, knee (Right, 2006); other (Left, 1984); wedge  resection lung (Right, ); henrietta by laparoscopy (); mass excision general (Left, ); bronchoscopy,diagnostic (N/A, 2022); endobronchial us add-on (2022); other orthopedic surgery (); other abdominal surgery (1984); thoracotomy (Right, 2023); node dissection (Right, 2023); hydrocelectomy adult (Right); bronchoscopy,diagnostic (Right, 2023); remove thor lymph nodes rad regnl (Right, 2024); and thoracotomy (Right, 2024).    FAMILY HISTORY  Family History   Problem Relation Age of Onset    Heart Disease Mother         MI age 78    Lung Disease Mother         TB    Cancer Father             Hypertension Father     Stroke Father     Diabetes Father             Diabetes Maternal Aunt     Diabetes Maternal Uncle     Cancer Paternal Uncle         skin    Diabetes Maternal Grandmother     Stroke Maternal Grandmother             Heart Disease Paternal Grandmother         MI after giving birth    Heart Disease Paternal Grandfather         MI    Ovarian Cancer Neg Hx     Tubal Cancer Neg Hx     Peritoneal Cancer Neg Hx     Colorectal Cancer Neg Hx     Breast Cancer Neg Hx        SOCIAL HISTORY  Social History     Tobacco Use    Smoking status: Never     Passive exposure: Past    Smokeless tobacco: Never    Tobacco comments:     Both of my parents smoked cigarettes   Vaping Use    Vaping status: Never Used   Substance and Sexual Activity    Alcohol use: Not Currently     Comment: Have been on the [place] AS for 30+ years    Drug use: Not Currently     Types: Oral     Comment: No drugs done in over 30 years    Sexual activity: Not Currently     Partners: Female     Birth control/protection: Other-See Comments     Comment: No need. Abdominal lymphadenectomy took care of that.       CURRENT MEDICATIONS  Home Medications       Reviewed by Deepthi Arana R.N. (Registered Nurse) on 25 at 0647  Med List Status: Partial     Medication Last Dose Status    acetaminophen (TYLENOL) 500 MG Tab  Active   buPROPion (WELLBUTRIN XL) 150 MG XL tablet  Active   furosemide (LASIX) 20 MG Tab  Active   hydroCHLOROthiazide 50 MG Tab  Active   Multiple Vitamin (MULTIVITAMINS PO)  Active   PSYLLIUM PO  Active   Saw Palmetto 500 MG Cap  Active   tadalafil 20 MG tablet  Active   Testosterone 20.25 MG/1.25GM (1.62%) Gel  Active   Zinc Citrate (ZINC EXTRA STRENGTH PO)  Active                    ALLERGIES  Allergies   Allergen Reactions    Mefoxin Swelling     Swelling everywhere       PHYSICAL EXAM  VITAL SIGNS: /81   Pulse 83   Temp 36 °C (96.8 °F) (Temporal)   Resp 16   SpO2 91%    Physical Exam  Constitutional:       Appearance: Normal appearance.   HENT:      Head: Normocephalic.      Right Ear: Tympanic membrane normal.      Left Ear: Tympanic membrane normal.      Nose: Nose normal.      Mouth/Throat:      Mouth: Mucous membranes are moist.   Eyes:      Extraocular Movements: Extraocular movements intact.      Pupils: Pupils are equal, round, and reactive to light.   Cardiovascular:      Rate and Rhythm: Normal rate and regular rhythm.   Pulmonary:      Effort: Pulmonary effort is normal. No respiratory distress.      Breath sounds: Normal breath sounds. No stridor. No wheezing or rales.   Chest:      Chest wall: No tenderness.   Abdominal:      General: Abdomen is flat. There is no distension.      Palpations: Abdomen is soft. There is no mass.      Tenderness: There is no abdominal tenderness. There is no right CVA tenderness or left CVA tenderness.   Musculoskeletal:      Cervical back: Normal range of motion.   Skin:     General: Skin is warm.      Capillary Refill: Capillary refill takes less than 2 seconds.   Neurological:      General: No focal deficit present.      Mental Status: He is alert and oriented to person, place, and time.   Psychiatric:         Mood and Affect: Mood normal.           EKG/LABS  Results for orders placed or performed during the hospital  encounter of 03/12/25   CBC WITH DIFFERENTIAL    Collection Time: 03/12/25  7:06 AM   Result Value Ref Range    WBC 11.7 (H) 4.8 - 10.8 K/uL    RBC 4.31 (L) 4.70 - 6.10 M/uL    Hemoglobin 12.6 (L) 14.0 - 18.0 g/dL    Hematocrit 38.4 (L) 42.0 - 52.0 %    MCV 89.1 81.4 - 97.8 fL    MCH 29.2 27.0 - 33.0 pg    MCHC 32.8 32.3 - 36.5 g/dL    RDW 48.0 35.9 - 50.0 fL    Platelet Count 257 164 - 446 K/uL    MPV 9.7 9.0 - 12.9 fL    Neutrophils-Polys 84.60 (H) 44.00 - 72.00 %    Lymphocytes 6.70 (L) 22.00 - 41.00 %    Monocytes 7.60 0.00 - 13.40 %    Eosinophils 0.30 0.00 - 6.90 %    Basophils 0.30 0.00 - 1.80 %    Immature Granulocytes 0.50 0.00 - 0.90 %    Nucleated RBC 0.00 0.00 - 0.20 /100 WBC    Neutrophils (Absolute) 9.92 (H) 1.82 - 7.42 K/uL    Lymphs (Absolute) 0.78 (L) 1.00 - 4.80 K/uL    Monos (Absolute) 0.89 (H) 0.00 - 0.85 K/uL    Eos (Absolute) 0.03 0.00 - 0.51 K/uL    Baso (Absolute) 0.04 0.00 - 0.12 K/uL    Immature Granulocytes (abs) 0.06 0.00 - 0.11 K/uL    NRBC (Absolute) 0.00 K/uL   COMP METABOLIC PANEL    Collection Time: 03/12/25  7:06 AM   Result Value Ref Range    Sodium 135 135 - 145 mmol/L    Potassium 3.4 (L) 3.6 - 5.5 mmol/L    Chloride 98 96 - 112 mmol/L    Co2 25 20 - 33 mmol/L    Anion Gap 12.0 7.0 - 16.0    Glucose 140 (H) 65 - 99 mg/dL    Bun 21 8 - 22 mg/dL    Creatinine 1.21 0.50 - 1.40 mg/dL    Calcium 9.6 8.5 - 10.5 mg/dL    Correct Calcium 9.9 8.5 - 10.5 mg/dL    AST(SGOT) 23 12 - 45 U/L    ALT(SGPT) 10 2 - 50 U/L    Alkaline Phosphatase 86 30 - 99 U/L    Total Bilirubin 0.5 0.1 - 1.5 mg/dL    Albumin 3.6 3.2 - 4.9 g/dL    Total Protein 7.8 6.0 - 8.2 g/dL    Globulin 4.2 (H) 1.9 - 3.5 g/dL    A-G Ratio 0.9 g/dL   LIPASE    Collection Time: 03/12/25  7:06 AM   Result Value Ref Range    Lipase 32 11 - 82 U/L   ESTIMATED GFR    Collection Time: 03/12/25  7:06 AM   Result Value Ref Range    GFR (CKD-EPI) 70 >60 mL/min/1.73 m 2   URINALYSIS    Collection Time: 03/12/25  7:55 AM    Specimen:  Urine   Result Value Ref Range    Color Yellow     Character Clear     Specific Gravity 1.027 <1.035    Ph 5.5 5.0 - 8.0    Glucose Negative Negative mg/dL    Ketones Negative Negative mg/dL    Protein Negative Negative mg/dL    Bilirubin Negative Negative    Urobilinogen, Urine 1.0 <=1.0 EU/dL    Nitrite Negative Negative    Leukocyte Esterase Negative Negative    Occult Blood Negative Negative    Micro Urine Req see below        I have independently interpreted this EKG    RADIOLOGY/PROCEDURES   I have independently interpreted the diagnostic imaging associated with this visit and am waiting the final reading from the radiologist.   My preliminary interpretation is as follows: Mild right hydronephrosis without evidence of stone    Radiologist interpretation:  CT-RENAL COLIC EVALUATION(A/P W/O)   Final Result         1.  Moderate right hydronephrosis, mildly increased from prior PET/CT, but uncertain etiology although could be related to postsurgical changes of extensive retroperitoneal lymph node dissection. No definite obstructing stone is seen. Subtle perinephric    fat stranding is nonspecific. This could be related to obstruction although cannot exclude superimposed infection.   2.  Extensive postsurgical changes of lymph node dissection.                     COURSE & MEDICAL DECISION MAKING    ASSESSMENT, COURSE AND PLAN  Care Narrative: Very well-appearing patient here with very reassuring exam.  Vitals all very reassuring.  Patient symptoms appear most consistent with likely kidney stone as the cause of his symptoms as he is status post cholecystectomy years ago.  Choledocholithiasis is also possible although patient without any evidence of jaundice.  Will check basic labs to see if there is evidence of cholestasis.  Functional bowel pain is also possible.  Check basic labs for further risk stratification.  Checking urinalysis.  Checking CT to evaluate for possible renal colic.  Patient given IM Toradol for  pain.  Patient is now pain-free.  Patient basic labs are all very reassuring.  No evidence of infection on urinalysis.  CT appears most consistent with passed stone.  Patient without any ongoing pain.  Patient discharged home with supportive care.  Follow-up with urology as needed who is already established with.  I called patient hours after his discharge to ensure he has not had any recurrence of pain, he has not.  Return precautions reviewed.            FINAL DIAGNOSIS  1. Flank pain  ketorolac (TORADOL) 10 MG Tab    ondansetron (ZOFRAN ODT) 4 MG TABLET DISPERSIBLE

## 2025-03-12 NOTE — DISCHARGE INSTRUCTIONS
Your laboratory results were all very reassuring.  Your CT scan shows findings are most representative of a kidney stone that you likely passed.  There is no evidence of a current kidney stone.  There is some inflammation surrounding your kidney but your urine is not consistent with an infection.  If your symptoms worsen significantly please return to the emerged part.  Have given some Toradol and Zofran to take for pain and nausea respectively.  If you develop severe chest pain, shortness of breath, or have any other concerns please return to the emergency department

## 2025-03-12 NOTE — ED TRIAGE NOTES
59 y.o. male Babatunde Sullivan  Chief Complaint   Patient presents with    Flank Pain     Right since last night.      Patient presented to ED with complaints of right flank pain since last night, patient denies any fever or dysuria, patient reported his brother has kidney stones.     Patient AAO X4 , Respirations even and unlabored on room air , afebrile at this time.     ED RN protocol initiated for  abdominal pain       /76   Pulse 74   Temp 36 °C (96.8 °F) (Temporal)   Resp 14   SpO2 95%     Allergies   Allergen Reactions    Mefoxin Swelling     Swelling everywhere     Past Medical History:   Diagnosis Date    Allergy     Anesthesia     PONV    Arthritis     osteo-lower back    Back pain     Breath shortness     Bronchitis 03/2021    Bronchitis approx once a year    Cancer (HCC) 1984    testicular    Cancer (HCC) 09/2022    Lung CA    Carcinoma in situ of respiratory system     Chickenpox     Cough     Heart burn     History of blood transfusion 1984    Influenza     H/O    Obesity     PONV (postoperative nausea and vomiting)     Ringing in ears     Snoring     Tonsillitis     H/O    Wears glasses     Wheezing      Past Surgical History:   Procedure Laterality Date    KS REMOVE THOR LYMPH NODES RAD REGNL Right 12/20/2024    Procedure: LYMPHADENECTOMY;  Surgeon: John H Ganser, M.D.;  Location: [facility];  Service: General    THORACOTOMY Right 12/20/2024    Procedure: RIGHT THORACOTOMY, COMPLETION PNEUMONECTOMY, NODE DISSECTION;  Surgeon: John H Ganser, M.D.;  Location: [facility];  Service: General    KS BRONCHOSCOPY,DIAGNOSTIC Right 11/17/2023    Procedure: FIBER OPTIC BRONCHOSCOPY WITH  BRUSH, BRONCHOALVEOLAR LAVAGE, BIOPSY AND FINE NEEDLE ASPIRATION,  NAVIGATION,  ROBOTICS;  Surgeon: Genevieve Calderon M.D.;  Location: [facility];  Service: Pulmonary Robotic    THORACOTOMY Right 02/01/2023    Procedure: RIGHT THORACOTOMY, MIDDLE AND LOWER LOBECTOMY, NODE DISSECTION;   Surgeon: John H Ganser, M.D.;  Location: [facility];  Service: General    NODE DISSECTION Right 02/01/2023    Procedure: LYMPHADENECTOMY;  Surgeon: John H Ganser, M.D.;  Location: [facility];  Service: General    MS BRONCHOSCOPY,DIAGNOSTIC N/A 09/01/2022    Procedure: FIBER OPTIC BRONCHOSCOPY WITH  BRUSH, BIOPSY, FINE NEEDLE ASPIRATION AND ENDOBRONCHIAL ULTRASOUND;  Surgeon: Adam Rahman M.D.;  Location: [facility];  Service: Pulmonary    ENDOBRONCHIAL US ADD-ON  09/01/2022    Procedure: ENDOBRONCHIAL ULTRASOUND (EBUS);  Surgeon: Adam Rahman M.D.;  Location: [facility];  Service: Pulmonary    ARTHROSCOPY, KNEE Right 2006    acl    JOCELINE BY LAPAROSCOPY  2002    scar tissue removed 2 years later (2004)    OTHER Left 1984    Embrionic carcinoma, testicle removed    WEDGE RESECTION LUNG Right 1984    toracotomy with wedge resection    MASS EXCISION GENERAL Left 1984    Mass removed left kidney    TONSILLECTOMY  1972    HYDROCELECTOMY ADULT Right     OTHER ABDOMINAL SURGERY  12/1984    Lymphadenectomy and tumor removal    OTHER ORTHOPEDIC SURGERY  2006    Right ACL replacement     Pt made aware of triage process, placed back into lobby, educated pt to tell staff of any worsening of symptoms

## 2025-03-12 NOTE — ED NOTES
Pt discharged home with written and verbal instructions regarding f/u, activity, reasons to return to ED & RX to  at preferred pharmacy. Verbalized understanding, all questions addressed, accompanied out in wheelchair from home, with spouse with all belongings.

## 2025-03-18 ENCOUNTER — OFFICE VISIT (OUTPATIENT)
Dept: URGENT CARE | Facility: PHYSICIAN GROUP | Age: 60
End: 2025-03-18
Payer: COMMERCIAL

## 2025-03-18 ENCOUNTER — APPOINTMENT (OUTPATIENT)
Dept: URGENT CARE | Facility: PHYSICIAN GROUP | Age: 60
End: 2025-03-18
Payer: COMMERCIAL

## 2025-03-18 VITALS
RESPIRATION RATE: 16 BRPM | WEIGHT: 253 LBS | BODY MASS INDEX: 30.81 KG/M2 | OXYGEN SATURATION: 93 % | HEIGHT: 76 IN | SYSTOLIC BLOOD PRESSURE: 126 MMHG | DIASTOLIC BLOOD PRESSURE: 84 MMHG | HEART RATE: 91 BPM | TEMPERATURE: 99.2 F

## 2025-03-18 DIAGNOSIS — J40 BRONCHITIS: ICD-10-CM

## 2025-03-18 PROCEDURE — 99213 OFFICE O/P EST LOW 20 MIN: CPT | Performed by: FAMILY MEDICINE

## 2025-03-18 PROCEDURE — 3074F SYST BP LT 130 MM HG: CPT | Performed by: FAMILY MEDICINE

## 2025-03-18 PROCEDURE — 3079F DIAST BP 80-89 MM HG: CPT | Performed by: FAMILY MEDICINE

## 2025-03-18 RX ORDER — OXYCODONE HYDROCHLORIDE 5 MG/1
TABLET ORAL
COMMUNITY

## 2025-03-18 RX ORDER — TESTOSTERONE 20.25 MG/1.25G
GEL TOPICAL
COMMUNITY
Start: 2025-01-13

## 2025-03-18 RX ORDER — DOXYCYCLINE HYCLATE 100 MG
100 TABLET ORAL 2 TIMES DAILY
Qty: 20 TABLET | Refills: 0 | Status: SHIPPED | OUTPATIENT
Start: 2025-03-18 | End: 2025-03-28

## 2025-03-18 RX ORDER — FLUTICASONE PROPIONATE 50 MCG
2 SPRAY, SUSPENSION (ML) NASAL DAILY
COMMUNITY
Start: 2025-02-27

## 2025-03-18 RX ORDER — MUPIROCIN 20 MG/G
OINTMENT TOPICAL
COMMUNITY
Start: 2025-02-27

## 2025-03-18 RX ORDER — HYDROCODONE BITARTRATE AND ACETAMINOPHEN 5; 325 MG/1; MG/1
TABLET ORAL
COMMUNITY

## 2025-03-18 ASSESSMENT — ENCOUNTER SYMPTOMS
FEVER: 1
SPUTUM PRODUCTION: 0
EYES NEGATIVE: 1
GASTROINTESTINAL NEGATIVE: 1
CARDIOVASCULAR NEGATIVE: 1
SHORTNESS OF BREATH: 1
COUGH: 1
WHEEZING: 0
HEMOPTYSIS: 0

## 2025-03-18 ASSESSMENT — FIBROSIS 4 INDEX: FIB4 SCORE: 1.61

## 2025-03-18 NOTE — LETTER
[facility]  [facility] [address] [address]     March 18, 2025    Patient: Babatunde Sullivan   YOB: 1965   Date of Visit: 3/18/2025       To Whom It May Concern:    Babatunde Sullivan was seen and treated in our department on 3/18/2025. Please excuse 3/18-3/21.    Sincerely,     Brian Rodriguez M.D.

## 2025-03-19 NOTE — PROGRESS NOTES
"Subjective:   Babatunde Sullivan is a 59 y.o. male who presents for Cough (X x 3 days, pt states he only has one lung )      Patient with cough, low grade fever, sob, has had his right lung removed for lung cancer.  Wife has been coughing as well.    Cough  Associated symptoms include a fever and shortness of breath. Pertinent negatives include no hemoptysis or wheezing.       Review of Systems   Constitutional:  Positive for fever and malaise/fatigue.   HENT: Negative.     Eyes: Negative.    Respiratory:  Positive for cough and shortness of breath. Negative for hemoptysis, sputum production and wheezing.    Cardiovascular: Negative.    Gastrointestinal: Negative.    Genitourinary: Negative.    Skin: Negative.        Medications, Allergies, and current problem list reviewed today in Epic.     Objective:     /84   Pulse 91   Temp 37.3 °C (99.2 °F) (Temporal)   Resp 16   Ht 1.93 m (6' 4\")   Wt 115 kg (253 lb)   SpO2 93%     Physical Exam  Vitals and nursing note reviewed.   Constitutional:       Appearance: Normal appearance.   HENT:      Head: Normocephalic.      Right Ear: Tympanic membrane normal.      Left Ear: Tympanic membrane normal.      Nose: Nose normal.      Mouth/Throat:      Pharynx: Oropharynx is clear.   Cardiovascular:      Rate and Rhythm: Normal rate and regular rhythm.   Pulmonary:      Effort: Pulmonary effort is normal.      Breath sounds: No wheezing, rhonchi or rales.   Chest:      Chest wall: No tenderness.   Abdominal:      General: Abdomen is flat.      Palpations: Abdomen is soft.   Musculoskeletal:      Cervical back: Normal range of motion and neck supple.   Neurological:      Mental Status: He is alert.         Assessment/Plan:     Diagnosis and associated orders:     1. Bronchitis  doxycycline (VIBRAMYCIN) 100 MG Tab         Comments/MDM:              Differential diagnosis, natural history, supportive care, and indications for immediate follow-up discussed.    Advised the " patient to follow-up with the primary care physician for recheck, reevaluation, and consideration of further management.    Please note that this dictation was created using voice recognition software. I have made a reasonable attempt to correct obvious errors, but I expect that there are errors of grammar and possibly content that I did not discover before finalizing the note.    This note was electronically signed by Brian Rodriguez M.D.

## 2025-03-30 ENCOUNTER — HOSPITAL ENCOUNTER (OUTPATIENT)
Dept: RADIOLOGY | Facility: MEDICAL CENTER | Age: 60
End: 2025-03-30
Attending: SURGERY
Payer: COMMERCIAL

## 2025-03-30 DIAGNOSIS — C34.90 MALIGNANT NEOPLASM OF UNSPECIFIED PART OF UNSPECIFIED BRONCHUS OR LUNG (HCC): ICD-10-CM

## 2025-03-30 PROCEDURE — 700117 HCHG RX CONTRAST REV CODE 255: Performed by: SURGERY

## 2025-03-30 PROCEDURE — 71260 CT THORAX DX C+: CPT

## 2025-03-30 RX ADMIN — IOHEXOL 80 ML: 350 INJECTION, SOLUTION INTRAVENOUS at 15:39

## 2025-04-04 ENCOUNTER — HOSPITAL ENCOUNTER (OUTPATIENT)
Facility: MEDICAL CENTER | Age: 60
End: 2025-04-04
Payer: COMMERCIAL

## 2025-04-04 PROCEDURE — 82438 ASSAY OTHER FLUID CHLORIDES: CPT

## 2025-04-04 PROCEDURE — 84302 ASSAY OF SWEAT SODIUM: CPT

## 2025-04-04 PROCEDURE — 82272 OCCULT BLD FECES 1-3 TESTS: CPT

## 2025-04-04 PROCEDURE — 84999 UNLISTED CHEMISTRY PROCEDURE: CPT

## 2025-04-04 PROCEDURE — 83993 ASSAY FOR CALPROTECTIN FECAL: CPT

## 2025-04-04 PROCEDURE — 82653 EL-1 FECAL QUANTITATIVE: CPT

## 2025-04-05 ENCOUNTER — HOSPITAL ENCOUNTER (OUTPATIENT)
Dept: LAB | Facility: MEDICAL CENTER | Age: 60
End: 2025-04-05
Attending: UROLOGY
Payer: COMMERCIAL

## 2025-04-05 DIAGNOSIS — E29.1 PRIMARY HYPOGONADISM IN MALE: ICD-10-CM

## 2025-04-05 LAB
ERYTHROCYTE [DISTWIDTH] IN BLOOD BY AUTOMATED COUNT: 53.3 FL (ref 35.9–50)
HCT VFR BLD AUTO: 37.8 % (ref 42–52)
HEMOCCULT STL QL: NEGATIVE
HGB BLD-MCNC: 12.3 G/DL (ref 14–18)
MCH RBC QN AUTO: 29.6 PG (ref 27–33)
MCHC RBC AUTO-ENTMCNC: 32.5 G/DL (ref 32.3–36.5)
MCV RBC AUTO: 91.1 FL (ref 81.4–97.8)
PLATELET # BLD AUTO: 227 K/UL (ref 164–446)
PMV BLD AUTO: 10.6 FL (ref 9–12.9)
RBC # BLD AUTO: 4.15 M/UL (ref 4.7–6.1)
TESTOST SERPL-MCNC: 214 NG/DL (ref 175–781)
WBC # BLD AUTO: 6.5 K/UL (ref 4.8–10.8)

## 2025-04-05 PROCEDURE — 85027 COMPLETE CBC AUTOMATED: CPT

## 2025-04-05 PROCEDURE — 36415 COLL VENOUS BLD VENIPUNCTURE: CPT

## 2025-04-05 PROCEDURE — 84403 ASSAY OF TOTAL TESTOSTERONE: CPT

## 2025-04-08 LAB
CALPROTECTIN STL-MCNT: 20 UG/G
CHLORIDE STL-SCNC: NORMAL MMOL/L
ELASTASE PANC STL-MCNT: 786 UG/G
POTASSIUM STL-SCNC: NORMAL MMOL/L
SODIUM STL-SCNC: NORMAL MMOL/L

## 2025-04-14 ENCOUNTER — OFFICE VISIT (OUTPATIENT)
Dept: UROLOGY | Facility: MEDICAL CENTER | Age: 60
End: 2025-04-14
Payer: COMMERCIAL

## 2025-04-14 DIAGNOSIS — Z85.47 HISTORY OF TESTICULAR CANCER: Chronic | ICD-10-CM

## 2025-04-14 DIAGNOSIS — N52.9 ERECTILE DYSFUNCTION, UNSPECIFIED ERECTILE DYSFUNCTION TYPE: ICD-10-CM

## 2025-04-14 DIAGNOSIS — E29.1 PRIMARY HYPOGONADISM IN MALE: ICD-10-CM

## 2025-04-14 PROCEDURE — 99213 OFFICE O/P EST LOW 20 MIN: CPT | Performed by: UROLOGY

## 2025-04-14 RX ORDER — TESTOSTERONE 20.25 MG/1.25G
40.5 GEL TOPICAL DAILY
Qty: 1 G | Refills: 3 | Status: SHIPPED | OUTPATIENT
Start: 2025-04-14 | End: 2025-04-15

## 2025-04-14 NOTE — PROGRESS NOTES
Chief Complaint: hypogonadism    HPI: Babatunde Sullivan is a 59 y.o. male with a history of testicular cancer s/p left orchiectomy, RPLND at age 18, and lung cancer treated surgically and with chemotherapy. Here today for follow up of symptomatic hypogonadism and ED after starting therapy with topical testosterone therapy.     He feels no improvement in his symptoms, and his lab evaluation is also unchanged. His total serum testosterone actually dipped further from 251 to 2144.     He has been applying the single pump of gel to his shoulders each morning, rubbing it in well and letting it dry before putting on his shirt. He's had no skin reactions or other noted adverse reactions.     Re: erectile function, he has used tadalafil once with some improved rigidity, but had penile pain. Of note, he was in the ED recently after passing a kidney stone, and a CTAP demonstrated a small calcification that appears to be on the septum of the corpora cavernosa in the proximal shaft of the penis. He's had pain with erections since his chemotherapy, so unclear if the pain is related to new Peyronie's disease, Peyronie's that began coincidentally around the time of his chemotherapy, or rather due to neuropathy and referred pain.     Past Medical History:  Past Medical History:   Diagnosis Date    Allergy     Anesthesia     PONV    Arthritis     osteo-lower back    Back pain     Breath shortness     Bronchitis 03/2021    Bronchitis approx once a year    Cancer (HCC) 1984    testicular    Cancer (HCC) 09/2022    Lung CA    Carcinoma in situ of respiratory system     Chickenpox     Cough     Heart burn     History of blood transfusion 1984    Influenza     H/O    Obesity     PONV (postoperative nausea and vomiting)     Ringing in ears     Snoring     Tonsillitis     H/O    Wears glasses     Wheezing        Past Surgical History:  Past Surgical History:   Procedure Laterality Date    AR REMOVE THOR LYMPH NODES RAD REGNL Right  12/20/2024    Procedure: LYMPHADENECTOMY;  Surgeon: John H Ganser, M.D.;  Location: [facility];  Service: General    THORACOTOMY Right 12/20/2024    Procedure: RIGHT THORACOTOMY, COMPLETION PNEUMONECTOMY, NODE DISSECTION;  Surgeon: John H Ganser, M.D.;  Location: [facility];  Service: General    MD BRONCHOSCOPY,DIAGNOSTIC Right 11/17/2023    Procedure: FIBER OPTIC BRONCHOSCOPY WITH  BRUSH, BRONCHOALVEOLAR LAVAGE, BIOPSY AND FINE NEEDLE ASPIRATION,  NAVIGATION,  ROBOTICS;  Surgeon: Genevieve Calderon M.D.;  Location: [facility];  Service: Pulmonary Robotic    THORACOTOMY Right 02/01/2023    Procedure: RIGHT THORACOTOMY, MIDDLE AND LOWER LOBECTOMY, NODE DISSECTION;  Surgeon: John H Ganser, M.D.;  Location: [facility];  Service: General    NODE DISSECTION Right 02/01/2023    Procedure: LYMPHADENECTOMY;  Surgeon: John H Ganser, M.D.;  Location: [facility];  Service: General    MD BRONCHOSCOPY,DIAGNOSTIC N/A 09/01/2022    Procedure: FIBER OPTIC BRONCHOSCOPY WITH  BRUSH, BIOPSY, FINE NEEDLE ASPIRATION AND ENDOBRONCHIAL ULTRASOUND;  Surgeon: Adam Rahman M.D.;  Location: [facility];  Service: Pulmonary    ENDOBRONCHIAL US ADD-ON  09/01/2022    Procedure: ENDOBRONCHIAL ULTRASOUND (EBUS);  Surgeon: Adam Rahman M.D.;  Location: [facility];  Service: Pulmonary    ARTHROSCOPY, KNEE Right 2006    acl    JOCELINE BY LAPAROSCOPY  2002    scar tissue removed 2 years later (2004)    OTHER Left 1984    Embrionic carcinoma, testicle removed    WEDGE RESECTION LUNG Right 1984    toracotomy with wedge resection    MASS EXCISION GENERAL Left 1984    Mass removed left kidney    TONSILLECTOMY  1972    HYDROCELECTOMY ADULT Right     OTHER ABDOMINAL SURGERY  12/1984    Lymphadenectomy and tumor removal    OTHER ORTHOPEDIC SURGERY  2006    Right ACL replacement       Family History:  Family History   Problem Relation Age of Onset    Heart Disease Mother          MI age 78    Lung Disease Mother         TB    Cancer Father             Hypertension Father     Stroke Father     Diabetes Father             Diabetes Maternal Aunt     Diabetes Maternal Uncle     Cancer Paternal Uncle         skin    Diabetes Maternal Grandmother     Stroke Maternal Grandmother             Heart Disease Paternal Grandmother         MI after giving birth    Heart Disease Paternal Grandfather         MI    Ovarian Cancer Neg Hx     Tubal Cancer Neg Hx     Peritoneal Cancer Neg Hx     Colorectal Cancer Neg Hx     Breast Cancer Neg Hx        Social History:  Social History     Socioeconomic History    Marital status:      Spouse name: Not on file    Number of children: Not on file    Years of education: Not on file    Highest education level: Master's degree (e.g., MA, MS, Melissa, MEd, MSW, ENIO)   Occupational History    Occupation: leyla     Employer: [employer]   Tobacco Use    Smoking status: Never     Passive exposure: Past    Smokeless tobacco: Never    Tobacco comments:     Both of my parents smoked cigarettes   Vaping Use    Vaping status: Never Used   Substance and Sexual Activity    Alcohol use: Not Currently     Comment: Have been on the wagon for 30+ years    Drug use: Not Currently     Types: Oral     Comment: No drugs done in over 30 years    Sexual activity: Not Currently     Partners: Female     Birth control/protection: Other-See Comments     Comment: No need. Abdominal lymphadenectomy took care of that.   Other Topics Concern    Not on file   Social History Narrative    1 adopted child.     Social Drivers of Health     Financial Resource Strain: Low Risk  (2024)    Overall Financial Resource Strain (CARDIA)     Difficulty of Paying Living Expenses: Not hard at all   Food Insecurity: No Food Insecurity (2024)    Hunger Vital Sign     Worried About Running Out of Food in the Last Year: Never true     Ran Out of Food in the Last Year: Never true    Transportation Needs: No Transportation Needs (12/20/2024)    PRAPARE - Transportation     Lack of Transportation (Medical): No     Lack of Transportation (Non-Medical): No   Physical Activity: Insufficiently Active (12/20/2024)    Exercise Vital Sign     Days of Exercise per Week: 5 days     Minutes of Exercise per Session: 10 min   Stress: No Stress Concern Present (12/20/2024)    [employer] [place] [employer] - Occupational Stress Questionnaire     Feeling of Stress : Not at all   Social Connections: Moderately Integrated (12/20/2024)    Social Connection and Isolation Panel [NHANES]     Frequency of Communication with Friends and Family: Never     Frequency of Social Gatherings with Friends and Family: Never     Attends Muslim Services: More than 4 times per year     Active Member of Clubs or Organizations: Yes     Attends Club or Organization Meetings: More than 4 times per year     Marital Status:    Intimate Partner Violence: Not At Risk (12/20/2024)    Humiliation, Afraid, Rape, and Kick questionnaire     Fear of Current or Ex-Partner: No     Emotionally Abused: No     Physically Abused: No     Sexually Abused: No   Housing Stability: Low Risk  (12/20/2024)    Housing Stability Vital Sign     Unable to Pay for Housing in the Last Year: No     Number of Times Moved in the Last Year: 0     Homeless in the Last Year: No       Medications:  Current Outpatient Medications   Medication Sig Dispense Refill    fluticasone (FLONASE) 50 MCG/ACT nasal spray 2 Sprays every day. (Patient not taking: Reported on 3/18/2025)      HYDROcodone-acetaminophen (NORCO) 5-325 MG Tab per tablet Take 1 tablet every 6 hours by oral route for 7 days, for pain. (Patient not taking: Reported on 3/18/2025)      mupirocin (BACTROBAN) 2 % Ointment APPLY TOPICALLY TO THE AFFECTED AREA TWICE DAILY FOR 7-10 DAYS. (Patient not taking: Reported on 3/18/2025)      oxyCODONE immediate-release (ROXICODONE) 5 MG Tab  (Patient  not taking: Reported on 3/18/2025)      Testosterone 1.62 % Gel APPLY 1 PUMP TOPICALLY ONCE DAILY (Patient not taking: Reported on 3/18/2025)      ondansetron (ZOFRAN ODT) 4 MG TABLET DISPERSIBLE Dissolve 1 Tablet by mouth every 6 hours as needed for Nausea/Vomiting. 10 Tablet 0    buPROPion (WELLBUTRIN XL) 150 MG XL tablet Take 1 Tablet by mouth every morning. 90 Tablet 1    tadalafil 20 MG tablet Take 1 Tablet by mouth as needed for Erectile Dysfunction. Take one tablet at least one hour prior to sexual activity. Maximum one per day. 30 Tablet 3    Testosterone 20.25 MG/1.25GM (1.62%) Gel Place 20.25 mg on the skin every day for 198 days. 1 g 3    Saw Palmetto 500 MG Cap Take 540 mg by mouth every day. (Takes 6 capsules daily)      hydroCHLOROthiazide 50 MG Tab Take 1 Tablet by mouth every day. 90 Tablet 3    furosemide (LASIX) 20 MG Tab Take 1 Tablet by mouth 1 time a day as needed (leg swelling). (Patient not taking: Reported on 12/20/2024) 30 Tablet 2    acetaminophen (TYLENOL) 500 MG Tab Take 500-1,000 mg by mouth every 6 hours as needed for Mild Pain.      Zinc Citrate (ZINC EXTRA STRENGTH PO) Take 50 mg by mouth every day.      Multiple Vitamin (MULTIVITAMINS PO) Take 1 Tablet by mouth every day.      PSYLLIUM PO Take 5 Capsules by mouth 1 time a day as needed.       No current facility-administered medications for this visit.       Allergies:  Allergies   Allergen Reactions    Cefoxitin Swelling     Other Reaction(s): facial swelling    Mefoxin Swelling     Swelling everywhere         Physical Exam:  There were no vitals filed for this visit.    GENERAL: well appearing, well nourished, NAD  RESP: respiratory effort normal  ABDOMEN: soft, nontender, nondistended, no masses or organomegaly  SKIN/LYMPH: normal coloration and turgor, no suspicious skin lesions noted  NEURO/PSYCH: alert, oriented, normal speech, no focal findings or movement disorder noted  EXTREMITIES: no pedal edema noted  Data  "Review:    Labs:  POCT UA No results found for: \"POCCOLOR\", \"POCAPPEAR\", \"POCLEUKEST\", \"POCNITRITE\", \"POCUROBILIGE\", \"POCPROTEIN\", \"POCURPH\", \"POCBLOOD\", \"POCSPGRV\", \"POCKETONES\", \"POCBILIRUBIN\", \"POCGLUCUA\"   CBC   Lab Results   Component Value Date/Time    WBC 6.5 04/05/2025 1104    RBC 4.15 (L) 04/05/2025 1104    HEMOGLOBIN 12.3 (L) 04/05/2025 1104    HEMATOCRIT 37.8 (L) 04/05/2025 1104    MCV 91.1 04/05/2025 1104    MCH 29.6 04/05/2025 1104    MCHC 32.5 04/05/2025 1104    RDW 53.3 (H) 04/05/2025 1104    MPV 10.6 04/05/2025 1104    LYMPHOCYTES 6.70 (L) 03/12/2025 0706    LYMPHS 0.78 (L) 03/12/2025 0706    MONOCYTES 7.60 03/12/2025 0706    MONOS 0.89 (H) 03/12/2025 0706    EOSINOPHILS 0.30 03/12/2025 0706    EOS 0.03 03/12/2025 0706    BASOPHILS 0.30 03/12/2025 0706    BASO 0.04 03/12/2025 0706    NRBC 0.00 03/12/2025 0706       CMP   Lab Results   Component Value Date/Time    SODIUM 135 03/12/2025 0706    POTASSIUM 3.4 (L) 03/12/2025 0706    CHLORIDE 98 03/12/2025 0706    CO2 25 03/12/2025 0706    ANION 12.0 03/12/2025 0706    GLUCOSE 140 (H) 03/12/2025 0706    BUN 21 03/12/2025 0706    CREATININE 1.21 03/12/2025 0706    GFRCKD 70 03/12/2025 0706    CALCIUM 9.6 03/12/2025 0706    CORRCALC 9.9 03/12/2025 0706    ASTSGOT 23 03/12/2025 0706    ALTSGPT 10 03/12/2025 0706    ALKPHOSPHAT 86 03/12/2025 0706    TBILIRUBIN 0.5 03/12/2025 0706    ALBUMIN 3.6 03/12/2025 0706    TOTPROTEIN 7.8 03/12/2025 0706    GLOBULIN 4.2 (H) 03/12/2025 0706    AGRATIO 0.9 03/12/2025 0706     INFERTILITY   Lab Results   Component Value Date/Time    TESTOSTERONE 214 04/05/2025 1104    FREETESTOST 38 (L) 11/25/2024 0909    SEXHORM 44 11/25/2024 0909     PSA   Lab Results   Component Value Date/Time    PSATOTAL 0.18 06/19/2024 1117       Assessment: 59 y.o.male with a history of primary hypogonadism in the setting of prior left orchiectomy and RPLND for testis cancer at age 18, and more recent surgery and chemotherapy for lung cancer. He has " had no improvement in his hypogonadal symptoms (lost libido, ED, decreased mood and energy) with an initial trial of TRT using one pump of topical testosterone, and his serum testosterone also did not respond.    We discussed options including changing his dose and alternative routes of administration of TRT. We agreed to increase to 40.5 mg topical daily and recheck his labs in about 6 weeks.     We also discussed prn use of NSAIDS prior to sexual activity; if his pain is secondary to Peyronie's disease this often helps substantially.       Plan:    -Increase testosterone gel to 40.5 mg daily  -Repeat serum T and CBC in 6 weeks  -Will call with lab results and discuss any changes in symptoms  -Continue tadalafil 20 mg prn for sexual activity; can take an ibuprofen 2 hours prior to decrease penile pain potentially associated with Peyronie's disease  -Follow up in 6 months or sooner as needed    Domenic Gil MD

## 2025-04-15 DIAGNOSIS — Z85.47 HISTORY OF TESTICULAR CANCER: Chronic | ICD-10-CM

## 2025-04-15 DIAGNOSIS — E29.1 PRIMARY HYPOGONADISM IN MALE: ICD-10-CM

## 2025-04-15 RX ORDER — TESTOSTERONE 20.25 MG/1.25G
GEL TOPICAL
Qty: 120 G | Refills: 2 | OUTPATIENT
Start: 2025-04-15

## 2025-04-15 RX ORDER — TESTOSTERONE 20.25 MG/1.25G
GEL TOPICAL
Qty: 60 PACKET | Refills: 2 | Status: SHIPPED | OUTPATIENT
Start: 2025-04-15 | End: 2025-07-14

## 2025-04-16 NOTE — TELEPHONE ENCOUNTER
I called and spoke with the pharmacist; they will change to 2.5gm packets of 1.62% testosterone gel (still 40.5 mg dose).

## 2025-05-29 ENCOUNTER — HOSPITAL ENCOUNTER (OUTPATIENT)
Dept: LAB | Facility: MEDICAL CENTER | Age: 60
End: 2025-05-29
Attending: UROLOGY
Payer: COMMERCIAL

## 2025-05-29 DIAGNOSIS — E29.1 PRIMARY HYPOGONADISM IN MALE: ICD-10-CM

## 2025-05-29 LAB
ERYTHROCYTE [DISTWIDTH] IN BLOOD BY AUTOMATED COUNT: 53.6 FL (ref 35.9–50)
HCT VFR BLD AUTO: 40.7 % (ref 42–52)
HGB BLD-MCNC: 13 G/DL (ref 14–18)
MCH RBC QN AUTO: 30 PG (ref 27–33)
MCHC RBC AUTO-ENTMCNC: 31.9 G/DL (ref 32.3–36.5)
MCV RBC AUTO: 94 FL (ref 81.4–97.8)
PLATELET # BLD AUTO: 239 K/UL (ref 164–446)
PMV BLD AUTO: 10.1 FL (ref 9–12.9)
RBC # BLD AUTO: 4.33 M/UL (ref 4.7–6.1)
TESTOST SERPL-MCNC: 436 NG/DL (ref 175–781)
WBC # BLD AUTO: 7.4 K/UL (ref 4.8–10.8)

## 2025-05-29 PROCEDURE — 36415 COLL VENOUS BLD VENIPUNCTURE: CPT

## 2025-05-29 PROCEDURE — 85027 COMPLETE CBC AUTOMATED: CPT

## 2025-05-29 PROCEDURE — 84403 ASSAY OF TOTAL TESTOSTERONE: CPT

## 2025-05-31 ENCOUNTER — APPOINTMENT (OUTPATIENT)
Dept: LAB | Facility: MEDICAL CENTER | Age: 60
End: 2025-05-31
Payer: COMMERCIAL

## 2025-06-11 DIAGNOSIS — C34.91 ADENOCARCINOMA OF RIGHT LUNG (HCC): Primary | ICD-10-CM

## 2025-06-27 ENCOUNTER — OFFICE VISIT (OUTPATIENT)
Dept: MEDICAL GROUP | Facility: PHYSICIAN GROUP | Age: 60
End: 2025-06-27
Payer: COMMERCIAL

## 2025-06-27 ENCOUNTER — HOSPITAL ENCOUNTER (OUTPATIENT)
Dept: RADIOLOGY | Facility: MEDICAL CENTER | Age: 60
End: 2025-06-27
Attending: RADIOLOGY
Payer: COMMERCIAL

## 2025-06-27 VITALS
DIASTOLIC BLOOD PRESSURE: 76 MMHG | TEMPERATURE: 98.3 F | HEART RATE: 69 BPM | SYSTOLIC BLOOD PRESSURE: 114 MMHG | BODY MASS INDEX: 31.03 KG/M2 | WEIGHT: 254.85 LBS | HEIGHT: 76 IN | OXYGEN SATURATION: 95 %

## 2025-06-27 DIAGNOSIS — E29.1 PRIMARY HYPOGONADISM IN MALE: Chronic | ICD-10-CM

## 2025-06-27 DIAGNOSIS — C34.91 ADENOCARCINOMA OF RIGHT LUNG (HCC): ICD-10-CM

## 2025-06-27 DIAGNOSIS — N52.9 ERECTILE DYSFUNCTION, UNSPECIFIED ERECTILE DYSFUNCTION TYPE: ICD-10-CM

## 2025-06-27 DIAGNOSIS — F32.5 DEPRESSION, MAJOR, IN REMISSION (HCC): ICD-10-CM

## 2025-06-27 DIAGNOSIS — D64.9 ANEMIA, UNSPECIFIED TYPE: ICD-10-CM

## 2025-06-27 DIAGNOSIS — E78.5 DYSLIPIDEMIA: ICD-10-CM

## 2025-06-27 DIAGNOSIS — R73.01 ELEVATED FASTING GLUCOSE: ICD-10-CM

## 2025-06-27 DIAGNOSIS — Z85.118 HISTORY OF LUNG CANCER: ICD-10-CM

## 2025-06-27 DIAGNOSIS — Z85.47 HISTORY OF TESTICULAR CANCER: ICD-10-CM

## 2025-06-27 DIAGNOSIS — Z12.5 PROSTATE CANCER SCREENING: ICD-10-CM

## 2025-06-27 DIAGNOSIS — M79.89 LEG SWELLING: ICD-10-CM

## 2025-06-27 DIAGNOSIS — Z00.00 WELLNESS EXAMINATION: Primary | ICD-10-CM

## 2025-06-27 PROCEDURE — 71250 CT THORAX DX C-: CPT

## 2025-06-27 RX ORDER — HYDROCHLOROTHIAZIDE 50 MG/1
50 TABLET ORAL DAILY
COMMUNITY
End: 2025-06-27

## 2025-06-27 RX ORDER — HYDROCHLOROTHIAZIDE 50 MG/1
50 TABLET ORAL DAILY
Qty: 90 TABLET | Refills: 3 | Status: SHIPPED | OUTPATIENT
Start: 2025-06-27

## 2025-06-27 ASSESSMENT — PATIENT HEALTH QUESTIONNAIRE - PHQ9: CLINICAL INTERPRETATION OF PHQ2 SCORE: 0

## 2025-06-27 ASSESSMENT — FIBROSIS 4 INDEX: FIB4 SCORE: 1.73

## 2025-06-27 NOTE — PROGRESS NOTES
Subjective:     CC:   Chief Complaint   Patient presents with    Annual Exam       History of Present Illness  Babatunde Sullivan is a 59 y.o. male who presents for an annual exam. He is feeling well and has no complaints.    He has been monitoring his red blood cell count via [employer] and has observed a decrease. Additional blood work will be conducted to check iron and B12 levels to rule out deficiencies.    He follows up with urology for TRT. He reports that his erectile dysfunction has improved with the use of testosterone gel, negating the need for Cialis.    He experienced severe hand tremors as a side effect of Wellbutrin, which he found more distressing than his depression. 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 Even after reducing the Wellbutrin dosage by half, the tremors persisted.  He currently denies depression and thinks mood may have improved with TRT.        6/27/2025    11:40 AM 8/21/2024     7:40 AM 8/4/2022     2:10 PM   PHQ-9 Screening   Little interest or pleasure in doing things 0 - not at all 1 - several days 0 - not at all   Feeling down, depressed, or hopeless 0 - not at all 0 - not at all 0 - not at all   Trouble falling or staying asleep, or sleeping too much  1 - several days    Feeling tired or having little energy  3 - nearly every day    Poor appetite or overeating  2 - more than half the days    Feeling bad about yourself - or that you are a failure or have let yourself or your family down  1 - several days    Trouble concentrating on things, such as reading the newspaper or watching television  0 - not at all    Moving or speaking so slowly that other people could have noticed. Or the opposite - being so fidgety or restless that you have been moving around a lot more than usual  0 - not at all    Thoughts that you would be better off dead, or of hurting yourself in some way  0 - not at all    PHQ-2 Total Score 0 1 0   PHQ-9 Total Score  8       Interpretation of PHQ-9 Total Score   Score Severity   1-4 No Depression   5-9 Mild Depression   10-14 Moderate Depression   15-19 Moderately Severe Depression   20-27 Severe Depression    His current medication regimen includes hydrochlorothiazide 50 mg daily, which he reports as effective. He has not required the use of furosemide for leg swelling. He also takes a multivitamin, Zofran as needed for nausea, saw palmetto, zinc, and psyllium.    He underwent completion pneumonectomy for cancer in December 2024. March 2025 CT showed no suspicious nodules. He underwent a CT scan today and had pulmonary function testing in November 2024 prior to his surgery.        Health Maintenance  Cholesterol Screening: June 2024 , , HDL 34,   Diabetes Screening: June 2024   Aspirin Use: N/A  Diet / Execise: BMI 34.58  Smoking: denies  Substance Abuse: denies  Safe in relationship: yes  Dentist: follows up regularly  Ophthalmology: follows up regularly, wears glasses     Cancer screening  Colorectal Cancer Screening: cologuard negative Aug 2023  Lung Cancer Screening: history of lung cancer, follows up with oncology and CT every 3 months (done today)  Prostate Cancer Screening/PSA: June 2024 PSA WNL     Infectious disease screening/Immunizations  --HIV Screening: negative July 2023  --Hepatitis C Screening: negative July 2023  --Immunizations:               Influenza: declined              Tetanus: declined              Shingles: declined              Pneumococcal: declined                Hepatitis B: declined      He  has a past medical history of Allergy, Anesthesia, Arthritis, Back pain, Breath shortness, Bronchitis (03/2021), Cancer (HCC) (1984), Cancer (HCC) (09/2022), Carcinoma in situ of respiratory system, Chickenpox, Cough, Heart burn, History of blood transfusion (1984), Influenza, Obesity, PONV (postoperative nausea and vomiting), Ringing in ears, Snoring, Tonsillitis, Wears glasses, and  Wheezing.    He has no past medical history of Acute nasopharyngitis, Anginal syndrome (HCC), Arrhythmia, Asthma, Blood clotting disorder (HCC), Bowel habit changes, Cataract, Congestive heart failure (HCC), Continuous ambulatory peritoneal dialysis status (HCC), Coughing blood, Dental disorder, Diabetes (HCC), Dialysis patient (HCC), Disorder of thyroid, Emphysema of lung (HCC), Glaucoma, Gynecological disorder, Heart murmur, Heart valve disease, Hemorrhagic disorder (HCC), Hepatitis A, Hepatitis B, Hepatitis C, Hiatus hernia syndrome, High cholesterol, Hypertension, Indigestion, Jaundice, Myocardial infarct (HCC), Pacemaker, Pneumonia, Pregnant, Psychiatric problem, Renal disorder, Rheumatic fever, Seizure (HCC), Sleep apnea, Stroke (HCC), Tuberculosis, Urinary bladder disorder, or Urinary incontinence.  He  has a past surgical history that includes tonsillectomy (); arthroscopy, knee (Right, ); other (Left, ); wedge resection lung (Right, ); henrietta by laparoscopy (); mass excision general (Left, ); pr bronchoscopy,diagnostic (N/A, 2022); endobronchial us add-on (2022); other orthopedic surgery (); other abdominal surgery (1984); thoracotomy (Right, 2023); node dissection (Right, 2023); hydrocelectomy adult (Right); pr bronchoscopy,diagnostic (Right, 2023); pr remove thor lymph nodes rad regnl (Right, 2024); and thoracotomy (Right, 2024).  Family History   Problem Relation Age of Onset    Heart Disease Mother         MI age 78    Lung Disease Mother         TB    Cancer Father             Hypertension Father     Stroke Father     Diabetes Father             Diabetes Maternal Aunt     Diabetes Maternal Uncle     Cancer Paternal Uncle         skin    Diabetes Maternal Grandmother     Stroke Maternal Grandmother             Heart Disease Paternal Grandmother         MI after giving birth    Heart Disease Paternal  "Grandfather         MI    Ovarian Cancer Neg Hx     Tubal Cancer Neg Hx     Peritoneal Cancer Neg Hx     Colorectal Cancer Neg Hx     Breast Cancer Neg Hx      Social History[1]    Patient Active Problem List    Diagnosis Date Noted    Primary hypogonadism in male 12/02/2024    Erectile dysfunction 10/26/2024    Depression, major, in remission (HCC) 10/24/2024    Leg swelling 10/24/2024    Elevated fasting glucose 08/22/2024    Mediastinal adenopathy 11/10/2023    Right inguinal hernia 07/12/2023    History of testicular cancer 07/12/2023    Acute pain of right shoulder 07/12/2023    Adenocarcinoma, lung (HCC) 09/15/2022    Pulmonary nodule 1 cm or greater in diameter 09/03/2022    Thrombosis 09/01/2022       Current Medications[2] (including changes today)  Allergies: Cefoxitin and Mefoxin    Review of Systems   Constitutional: Negative for fever, chills and malaise/fatigue.   HENT: Negative for congestion.    Eyes: Negative for pain.   Respiratory: Negative for cough and shortness of breath.    Cardiovascular: Negative for leg swelling.   Gastrointestinal: Negative for nausea, vomiting, abdominal pain and diarrhea.   Genitourinary: Negative for dysuria and hematuria.   Skin: Negative for rash.   Neurological: Negative for dizziness, focal weakness and headaches.   Endo/Heme/Allergies: Does not bleed easily.   Psychiatric/Behavioral: Negative for depression.  The patient is not nervous/anxious.      Objective:     /76 (BP Location: Right arm, Patient Position: Sitting, BP Cuff Size: Adult)   Pulse 69   Temp 36.8 °C (98.3 °F) (Temporal)   Ht 1.93 m (6' 4\")   Wt 116 kg (254 lb 13.6 oz)   SpO2 95%   BMI 31.02 kg/m²   Body mass index is 31.02 kg/m².  Wt Readings from Last 4 Encounters:   06/27/25 116 kg (254 lb 13.6 oz)   03/18/25 115 kg (253 lb)   12/20/24 (!) 126 kg (277 lb 9 oz)   11/26/24 (!) 127 kg (280 lb 9.6 oz)       Physical Exam:  Constitutional: Well-developed and well-nourished. No acute " distress.   Skin: Skin is warm and dry. No rash noted.  Head: Atraumatic without lesions.  Eyes: Conjunctivae and extraocular motions are normal. Pupils are equal, round, and reactive to light. No scleral icterus.   Ears:  External ears unremarkable. Tympanic membranes clear and intact.  Mouth/Throat: Dentition is good. Tongue normal.  Neck: Supple, trachea midline. Normal range of motion. No thyromegaly. No lymphadenopathy.  Cardiovascular: Regular rate and rhythm, S1 and S2 without murmur, rubs, or gallops.    Lungs: Effort normal. Clear to auscultation on the left.  Abdomen: Soft, non tender, and without distention. Active bowel sounds.  Extremities: No cyanosis, clubbing, erythema, nor edema.  Musculoskeletal: Normal ROM in all extremities.  Neurological: Alert and oriented x 3.  No acute focal deficits.  Psychiatric:  Behavior, mood, and affect are appropriate.    Labs:  Hospital Outpatient Visit on 05/29/2025   Component Date Value Ref Range Status    WBC 05/29/2025 7.4  4.8 - 10.8 K/uL Final    RBC 05/29/2025 4.33 (L)  4.70 - 6.10 M/uL Final    Hemoglobin 05/29/2025 13.0 (L)  14.0 - 18.0 g/dL Final    Hematocrit 05/29/2025 40.7 (L)  42.0 - 52.0 % Final    MCV 05/29/2025 94.0  81.4 - 97.8 fL Final    MCH 05/29/2025 30.0  27.0 - 33.0 pg Final    MCHC 05/29/2025 31.9 (L)  32.3 - 36.5 g/dL Final    RDW 05/29/2025 53.6 (H)  35.9 - 50.0 fL Final    Platelet Count 05/29/2025 239  164 - 446 K/uL Final    MPV 05/29/2025 10.1  9.0 - 12.9 fL Final    Testosterone,Total 05/29/2025 436  175 - 781 ng/dL Final         Assessment and Plan:     1. Wellness examination (Primary)  Annual preventive exam done today.  Annual labs ordered.  UTD with colon and prostate cancer screening.  He declined all vaccines.  - CBC WITHOUT DIFFERENTIAL; Future  - Comp Metabolic Panel; Future    2. Anemia, unspecified type  Chronic, uncontrolled.  He has been slightly anemic for the past 2 years, around the time he was diagnosed with lung  cancer.  May 2025 hemoglobin 13 and hematocrit 40.7 with normal MCV.  Iron and B12 levels ordered to rule out deficiencies.  Cologuard was negative August 2023 and FOBT negative April 2025.  - FERRITIN; Future  - IRON/TOTAL IRON BIND; Future  - VITAMIN B12; Future  - CBC WITHOUT DIFFERENTIAL; Future    3. Primary hypogonadism in male  Chronic, controlled.  He follows up with Urology for TRT.  May 2025 testosterone WNL.    4. Erectile dysfunction, unspecified erectile dysfunction type  Chronic, controlled.  He follows up with Urology.  He reports improvement with TRT and no longer needs cialis.    5. History of lung cancer  Chronic, in remission.  Diagnosed with lung cancer Sept 2022 s/p chemo and surgery (right middle and lower lobectomy).  He underwent completion pneumonectomy in December 2024 due to recurrence of cancer.  He follows up with oncology.  March 2025 CT showed no suspicious nodules and CT done today.    6. Depression, major, in remission (HCC)  Chronic, in remission.  He stopped wellbutrin due to hand tremors.  PHQ-2 score 0 and medication not indicated at this time.  Mood may have improved with TRT.    7. Leg swelling  Chronic, controlled.  Continue HCTZ 50 mg daily.  - hydroCHLOROthiazide 50 MG Tab; Take 1 Tablet by mouth every day.  Dispense: 90 Tablet; Refill: 3    8. History of testicular cancer  Chronic, in remission.  History of testicular cancer s/p left orchiectomy and RPLND at age 18.  He follows up with urology.  Continue HCTZ 50 mg daily.  - hydroCHLOROthiazide 50 MG Tab; Take 1 Tablet by mouth every day.  Dispense: 90 Tablet; Refill: 3    9. Elevated fasting glucose  Chronic, uncontrolled.  Fasting glucose has been elevated for the past couple years and A1c in 2023 was 5.8, which is in the prediabetic range.  Repeat labs ordered.  - HEMOGLOBIN A1C; Future  - Comp Metabolic Panel; Future    10. Dyslipidemia  Chronic, uncontrolled.  June 2024 , HDL 34,  with normal TC.  Statin  not indicated and repeat labs ordered.  - Lipid Profile; Future    11. Prostate cancer screening  - PROSTATE SPECIFIC AG SCREENING; Future      Anticipatory guidance  --Discussed moderation in sodium/caffeine intake, saturated fat and cholesterol, caloric balance, sufficient fresh fruits/vegetables.  --Discussed brushing, flossing, and dental visits.   --Encouraged 150 minutes of exercise weekly.   --Discussed tobacco, alcohol, and other drug use.  --Discussed safety belts, safety helmets, smoke detector, gun safety etc.  --Discussed sun protection with minimum of spf 30.      Follow-up: Return in about 1 year (around 6/27/2026) for Annual preventive visit.    Verbal consent was acquired by the patient to use [employer] ambient listening note generation during this visit: Yes.    Please note that this dictation was created using voice recognition software. I have made every reasonable attempt to correct obvious errors, but I expect that there are errors of grammar and possibly content that I did not discover before finalizing the note.         [1]   Social History  Tobacco Use    Smoking status: Never     Passive exposure: Past    Smokeless tobacco: Never    Tobacco comments:     Both of my parents smoked cigarettes   Vaping Use    Vaping status: Never Used   Substance Use Topics    Alcohol use: Not Currently     Comment: Have been on the [employer] for 30+ years    Drug use: Not Currently     Types: Oral     Comment: No drugs done in over 30 years   [2]   Current Outpatient Medications   Medication Sig Dispense Refill    hydroCHLOROthiazide 50 MG Tab Take 1 Tablet by mouth every day. 90 Tablet 3    Testosterone 20.25 MG/1.25GM (1.62%) Gel PLACE 40.5 MG (2 PACKETS) ON THE SKIN ONCE DAILY 60 Packet 2    tadalafil 20 MG tablet Take 1 Tablet by mouth as needed for Erectile Dysfunction. Take one tablet at least one hour prior to sexual activity. Maximum one per day. 30 Tablet 3    acetaminophen (TYLENOL) 500 MG Tab Take  500-1,000 mg by mouth every 6 hours as needed for Mild Pain.      Zinc Citrate (ZINC EXTRA STRENGTH PO) Take 50 mg by mouth every day.      ondansetron (ZOFRAN ODT) 4 MG TABLET DISPERSIBLE Dissolve 1 Tablet by mouth every 6 hours as needed for Nausea/Vomiting. 10 Tablet 0    Saw Palmetto 500 MG Cap Take 540 mg by mouth every day. (Takes 6 capsules daily)      Multiple Vitamin (MULTIVITAMINS PO) Take 1 Tablet by mouth every day.      PSYLLIUM PO Take 5 Capsules by mouth 1 time a day as needed.       No current facility-administered medications for this visit.

## 2025-07-29 ENCOUNTER — PATIENT MESSAGE (OUTPATIENT)
Dept: UROLOGY | Facility: MEDICAL CENTER | Age: 60
End: 2025-07-29
Payer: COMMERCIAL

## 2025-07-29 DIAGNOSIS — E29.1 PRIMARY HYPOGONADISM IN MALE: Primary | Chronic | ICD-10-CM

## 2025-07-29 DIAGNOSIS — Z85.47 HISTORY OF TESTICULAR CANCER: Chronic | ICD-10-CM

## 2025-08-01 RX ORDER — TESTOSTERONE 20.25 MG/1.25G
40.5 GEL TOPICAL DAILY
Qty: 180 PACKET | Refills: 1 | Status: SHIPPED | OUTPATIENT
Start: 2025-08-01 | End: 2026-01-28

## 2025-08-05 DIAGNOSIS — Z85.47 HISTORY OF TESTICULAR CANCER: Chronic | ICD-10-CM

## 2025-08-05 DIAGNOSIS — E29.1 PRIMARY HYPOGONADISM IN MALE: ICD-10-CM

## 2025-08-05 RX ORDER — TESTOSTERONE 40.5 MG/2.5G
GEL TOPICAL
Qty: 90 PACKET | Refills: 1 | Status: SHIPPED | OUTPATIENT
Start: 2025-08-05 | End: 2025-11-03

## (undated) DEVICE — SUCTION INSTRUMENT YANKAUER BULBOUS TIP W/O VENT (50EA/CA)

## (undated) DEVICE — TOWELS CLOTH SURGICAL - (4/PK 20PK/CA)

## (undated) DEVICE — SPONGE DRAIN 4 X 4IN 6-PLY - (2/PK25PK/BX12BX/CS)

## (undated) DEVICE — CATHETER IV SAFETY 22 GA X 1 (50EA/BX)

## (undated) DEVICE — CHLORAPREP 26 ML APPLICATOR - ORANGE TINT(25/CA)

## (undated) DEVICE — GLOVE BIOGEL PI INDICATOR SZ 6.5 SURGICAL PF LF - (50/BX 4BX/CA)

## (undated) DEVICE — DRAPE IOBAN II INCISE 23X17 - (10EA/BX 4BX/CA)

## (undated) DEVICE — TUBE CONNECTING SUCTION - CLEAR PLASTIC STERILE 72 IN (50EA/CA)

## (undated) DEVICE — SLEEVE, VASO, THIGH, MED

## (undated) DEVICE — TUBING CLEARLINK DUO-VENT - C-FLO (48EA/CA)

## (undated) DEVICE — SYRINGE SAFETY 3 ML 18 GA X 1 1/2 BLUNT LL (100/BX 8BX/CA)

## (undated) DEVICE — CANISTER SUCTION 3000ML MECHANICAL FILTER AUTO SHUTOFF MEDI-VAC NONSTERILE LF DISP  (40EA/CA)

## (undated) DEVICE — TUBE NG SALEM SUMP 16FR (50EA/CA)

## (undated) DEVICE — SUTURE 1 VICRYL PLUS CTX - 36 INCH (36/BX)

## (undated) DEVICE — ELECTRODE DUAL RETURN W/ CORD - (50/PK)

## (undated) DEVICE — CLEANER ELECTRO-SURGICAL TIP - (25/BX 4BX/CA)

## (undated) DEVICE — CATHETER IV SAFETY 24 GA X 3/4 (50EA/BX)

## (undated) DEVICE — CANNULA O2 COMFORT SOFT EAR ADULT 7 FT TUBING (50/CA)

## (undated) DEVICE — SODIUM CHL IRRIGATION 0.9% 1000ML (12EA/CA)

## (undated) DEVICE — SUTURE 0 VICRYL PLUS CTX - 36 INCH (36/BX)

## (undated) DEVICE — SUTURE 0 SILK CT-1 (36PK/BX)

## (undated) DEVICE — LACTATED RINGERS INJ 1000 ML - (14EA/CA 60CA/PF)

## (undated) DEVICE — TROCAR Z THREAD12MM OPTICAL - NON BLADED (6/BX)

## (undated) DEVICE — STAPLER POWERED 45MM (3EA/BX)

## (undated) DEVICE — GLOVE BIOGEL PI ORTHO SZ 6 1/2 SURGICAL PF LF (40PR/BX)

## (undated) DEVICE — STAPLE 45MM WHTE 2.5MM - (12/BX)

## (undated) DEVICE — TOWEL STOP TIMEOUT SAFETY FLAG (40EA/CA)

## (undated) DEVICE — CLIP MED LG INTNL HRZN TI ESCP - (20/BX)

## (undated) DEVICE — CATHETER IV SAFETY 20 GA X 1-1/4 (50/BX)

## (undated) DEVICE — SOD. CHL. INJ. 0.9% 1000 ML - (14EA/CA 60CA/PF)

## (undated) DEVICE — SET EXTENSION WITH 2 PORTS (48EA/CA) ***PART #2C8610 IS A SUBSTITUTE*****

## (undated) DEVICE — STAPLER SKIN DISP - (6/BX 10BX/CA) VISISTAT

## (undated) DEVICE — TUBE SUCTION YANKAUER  1/4 X 6FT (20EA/CA)"

## (undated) DEVICE — SPONGE GAUZE NON-STERILE 4X4 - (2000/CA 10PK/CA)

## (undated) DEVICE — PAD LAP STERILE 18 X 18 - (5/PK 40PK/CA)

## (undated) DEVICE — GOWN WARMING STANDARD FLEX - (30/CA)

## (undated) DEVICE — DRAPE CHEST/BREAST - (12EA/CA)

## (undated) DEVICE — SET LEADWIRE 5 LEAD BEDSIDE DISPOSABLE ECG (1SET OF 5/EA)

## (undated) DEVICE — PACK MAJOR BASIN - (2EA/CA)

## (undated) DEVICE — BITE BLOCK ADULT 60FR (100EA/CA)

## (undated) DEVICE — CLIP LG INTNL HRZN TI ESCP LGT - (20/BX)

## (undated) DEVICE — DRESSING NON-ADHERING 8 X 3 - (50/BX)

## (undated) DEVICE — BLANKET WARMING LOWER BODY (10EA/CA)

## (undated) DEVICE — TISSEEL 4ML ----MUST ORDER A MIN OF 6EA----

## (undated) DEVICE — TUBE E-T HI-LO CUFF 8.0MM (10EA/PK)

## (undated) DEVICE — PACK MINOR BASIN - (2EA/CA)

## (undated) DEVICE — GLOVE BIOGEL M SZ 8 SURGICAL PF LTX - (50/BX 4BX/CA)

## (undated) DEVICE — WATER IRRIGATION STERILE 1000ML (12EA/CA)

## (undated) DEVICE — BRONCHOSCOPE VIDEO GLIDESCOPE BFLEX OD3.8 MM (5EA/PK)

## (undated) DEVICE — COVER LIGHT HANDLE ALC PLUS DISP (18EA/BX)

## (undated) DEVICE — SUTURE 4-0 VICRYL PLUS SH - UNDYED 27 INCH (36PK/BX)

## (undated) DEVICE — GLOVE, LITE (PAIR)

## (undated) DEVICE — SYRINGE SAFETY 10 ML 18 GA X 1 1/2 BLUNT LL (100/BX 4BX/CA)

## (undated) DEVICE — STAPLER 35MM ECHELON FLEX PWR VASCULAR (3/CA)

## (undated) DEVICE — GLOVE BIOGEL PI INDICATOR SZ 7.0 SURGICAL PF LF - (50/BX 4BX/CA)

## (undated) DEVICE — SUTURE GENERAL

## (undated) DEVICE — CANISTER SUCTION RIGID RED 1500CC (40EA/CA)

## (undated) DEVICE — BOVIE BLADE 6 EXTENDED - (50/PK)

## (undated) DEVICE — SYSTEM CHEST DRAIN ADULT/PEDS W/AUTO TRANSFUSION CAPABILITY SAHARA (6EA/CA)

## (undated) DEVICE — SET I.V. EXTENSION DIAL-A- - FLOW REGULATOR (48EA/CA)

## (undated) DEVICE — PROBE ENDOSCOPIC PERIPHERAL VISION ION 1.5 IF1000 (1/EA)

## (undated) DEVICE — TUBE E-T HI-LO CUFF 6.5MM (10EA/BX)

## (undated) DEVICE — STAPLER ECHELON 3000 45MM STANDARD (3EA/BX)

## (undated) DEVICE — GLOVE BIOGEL ECLIPSE PF LATEX SIZE 8.0  (50PR/BX)

## (undated) DEVICE — SENSOR OXIMETER ADULT SPO2 RD SET (20EA/BX)

## (undated) DEVICE — CATHETER GUIDING ION (1/EA)

## (undated) DEVICE — SUTURE 0 SILK TIES (36PK/BX)

## (undated) DEVICE — ROBOTIC SURGERY SERVICES

## (undated) DEVICE — SEALANT TISSUE CLOSURE KIT FIBIRIN VISTASEAL 10ML (1EA/BX)

## (undated) DEVICE — SPONGE GAUZESTER 4 X 4 4PLY - (128PK/CA)

## (undated) DEVICE — SYRINGE 10 ML CONTROL LL (25EA/BX 4BX/CA)

## (undated) DEVICE — CATHETER THORACIC 28FR - W/ SENTINEL EYE (10/CA)

## (undated) DEVICE — MASK WITH FACE SHIELD (25/BX 4BX/CA)

## (undated) DEVICE — KIT  I.V. START (100EA/CA)

## (undated) DEVICE — SPONGE KITTNER DISSECTORS - (5EA/PK 50PK/CA)

## (undated) DEVICE — STAPLE 45MM GREEN 4.1 ECHELON (12EA/BX)

## (undated) DEVICE — CONNECTOR Y TBG CRTY 5 IN 1 STERILE (50EA/CA)

## (undated) DEVICE — STAPLE 45MM GRAY 2.0MM (12EA/BX)

## (undated) DEVICE — GOWN SURGEONS X-LARGE - DISP. (30/CA)

## (undated) DEVICE — STAPLE 45MM BLUE 3.5MM ECHELON (12EA/BX)

## (undated) DEVICE — TUBE ENDOBRONCHEAL 39FR (1/EA)

## (undated) DEVICE — TUBE E-T HI-LO CUFF 7.0MM (10EA/PK)

## (undated) DEVICE — PACK LAP CHOLE OR - (2EA/CA)

## (undated) DEVICE — SUTURE 3-0 VICRYL PLUS SH - 8X 18 INCH (12/BX)

## (undated) DEVICE — SUTURE 2-0 SILK SH (36PK/BX)

## (undated) DEVICE — SYRINGE SAFETY 5 ML 18 GA X 1-1/2 BLUNT LL (100/BX 4BX/CA)

## (undated) DEVICE — GLOVE BIOGEL SZ 8 SURGICAL PF LTX - (50PR/BX 4BX/CA)

## (undated) DEVICE — SLEEVE VASO DVT COMPRESSION CALF MED - (10PR/CA)

## (undated) DEVICE — GLOVE SZ 6.5 BIOGEL PI MICRO - PF LF (50PR/BX)

## (undated) DEVICE — BOVIE  BLADE 6 EXTENDED - (50/PK)

## (undated) DEVICE — TUBE E-T HI-LO CUFF 8.5MM (10EA/PK)

## (undated) DEVICE — Device

## (undated) DEVICE — SYRINGE DISP. 60 CC LL - (30/BX, 12BX/CA)**WHEN THESE ARE GONE ORDER #500206**

## (undated) DEVICE — TUBE E-T HI-LO CUFF 7.5MM (10EA/PK)

## (undated) DEVICE — NEEDLE BIOPSY FLEXISION OD19 GA IF1000 (5EA/BX)

## (undated) DEVICE — BAG IV VISION ION IF1000 (10EA/BX)

## (undated) DEVICE — NEEDLE BIOPSY 19GA SYRINGE VIZISHOT 2 FLEX FOR EBUS

## (undated) DEVICE — STAPLE 35MM WHITE ECHELON FLEX (12/BX)

## (undated) DEVICE — DRAPE MAGNETIC (INSTRA-MAG) - (30/CA)

## (undated) DEVICE — GLOVE SZ 7 BIOGEL PI MICRO - PF LF (50PR/BX 4BX/CA)

## (undated) DEVICE — CLIP MED INTNL HRZN TI ESCP - (25/BX)

## (undated) DEVICE — CANISTER SUCTION 3000ML MECHANICAL FILTER AUTO SHUTOFF MEDI-VAC NONSTERILE LF DISP (40EA/CA)

## (undated) DEVICE — DRESSING LEUKOMED STERILE 11.75X4IN - (50/CA)

## (undated) DEVICE — GOWN SURGEONS LARGE - (32/CA)

## (undated) DEVICE — SLEEVE VASO CALF MED - (10PR/CA)